# Patient Record
Sex: FEMALE | Race: BLACK OR AFRICAN AMERICAN | Employment: OTHER | URBAN - METROPOLITAN AREA
[De-identification: names, ages, dates, MRNs, and addresses within clinical notes are randomized per-mention and may not be internally consistent; named-entity substitution may affect disease eponyms.]

---

## 2017-01-18 ENCOUNTER — HOSPITAL ENCOUNTER (OUTPATIENT)
Dept: LAB | Age: 82
Discharge: HOME | End: 2017-01-18
Payer: COMMERCIAL

## 2017-01-18 ENCOUNTER — OFFICE VISIT (OUTPATIENT)
Dept: FAMILY MEDICINE CLINIC | Facility: CLINIC | Age: 82
End: 2017-01-18

## 2017-01-18 VITALS
RESPIRATION RATE: 16 BRPM | TEMPERATURE: 97.7 F | HEIGHT: 59 IN | BODY MASS INDEX: 28.22 KG/M2 | DIASTOLIC BLOOD PRESSURE: 68 MMHG | OXYGEN SATURATION: 98 % | HEART RATE: 60 BPM | SYSTOLIC BLOOD PRESSURE: 136 MMHG | WEIGHT: 140 LBS

## 2017-01-18 DIAGNOSIS — E78.00 PURE HYPERCHOLESTEROLEMIA: ICD-10-CM

## 2017-01-18 DIAGNOSIS — R80.9 MICROALBUMINURIA: ICD-10-CM

## 2017-01-18 DIAGNOSIS — F17.211 CIGARETTE NICOTINE DEPENDENCE IN REMISSION: ICD-10-CM

## 2017-01-18 DIAGNOSIS — E55.9 VITAMIN D DEFICIENCY: ICD-10-CM

## 2017-01-18 DIAGNOSIS — I10 ESSENTIAL HYPERTENSION: ICD-10-CM

## 2017-01-18 LAB
ALBUMIN SERPL BCP-MCNC: 3.8 G/DL (ref 3.4–5)
ALBUMIN/GLOB SERPL: 1.2 {RATIO} (ref 0.8–1.7)
ALP SERPL-CCNC: 74 U/L (ref 45–117)
ALT SERPL-CCNC: 24 U/L (ref 13–56)
ANION GAP BLD CALC-SCNC: 8 MMOL/L (ref 3–18)
AST SERPL W P-5'-P-CCNC: 16 U/L (ref 15–37)
BILIRUB SERPL-MCNC: 0.3 MG/DL (ref 0.2–1)
BUN SERPL-MCNC: 11 MG/DL (ref 7–18)
BUN/CREAT SERPL: 14 (ref 12–20)
CALCIUM SERPL-MCNC: 8.2 MG/DL (ref 8.5–10.1)
CHLORIDE SERPL-SCNC: 102 MMOL/L (ref 100–108)
CO2 SERPL-SCNC: 28 MMOL/L (ref 21–32)
CREAT SERPL-MCNC: 0.77 MG/DL (ref 0.6–1.3)
GLOBULIN SER CALC-MCNC: 3.3 G/DL (ref 2–4)
GLUCOSE POC: 166 MG/DL
GLUCOSE SERPL-MCNC: 177 MG/DL (ref 74–99)
HBA1C MFR BLD HPLC: 8.5 %
POTASSIUM SERPL-SCNC: 3.7 MMOL/L (ref 3.5–5.5)
PROT SERPL-MCNC: 7.1 G/DL (ref 6.4–8.2)
SODIUM SERPL-SCNC: 138 MMOL/L (ref 136–145)

## 2017-01-18 PROCEDURE — 80053 COMPREHEN METABOLIC PANEL: CPT | Performed by: INTERNAL MEDICINE

## 2017-01-18 PROCEDURE — 36415 COLL VENOUS BLD VENIPUNCTURE: CPT | Performed by: INTERNAL MEDICINE

## 2017-01-18 RX ORDER — METFORMIN HYDROCHLORIDE 500 MG/1
1000 TABLET, EXTENDED RELEASE ORAL DAILY
Qty: 180 TAB | Refills: 3 | Status: SHIPPED | OUTPATIENT
Start: 2017-01-18 | End: 2017-04-25 | Stop reason: ALTCHOICE

## 2017-01-18 RX ORDER — PRAVASTATIN SODIUM 40 MG/1
40 TABLET ORAL
Qty: 90 TAB | Refills: 3 | Status: SHIPPED | OUTPATIENT
Start: 2017-01-18 | End: 2017-07-03

## 2017-01-18 NOTE — PROGRESS NOTES
Internal Medicine Progress Note    Today's Date:  2017   Patient:  Jaya López  Patient :  1932    Subjective:     Chief Complaint   Patient presents with    Diabetes    Hypertension      Hypertension   This is a chronic problem. BP is not at goal in the office. Pt states that bp are at goal at home. Pt takes diovan, atenolol and nifedipine. Pt reports compliance with these medications.      Osteopenia  This is a chronic problem. This is not at goal. Pt takes fosamax. Pt has been on this for two years. Last dexa scan was in .      Diabetes mellitus  This is a chronic problem. This is not controlled. Pt takes metformin. Pt is on aspirin and statin. Pt states that her BS go low at home. Nicotine dependence in remission  This is a chronic problem. This is at goal. Pt used to smoke 1/4 ppd. Pt has smoked for 2 yrs. Pt quit in .      Past Medical History   Diagnosis Date    Advance directive discussed with patient 10/12/2016    Cigarette nicotine dependence in remission 10/12/2016    Diabetes ([facility]     Essential hypertension 10/3/2016    Hypertension     Microalbuminuria 2017    Osteopenia 10/3/2016    Osteoporosis     Pure hypercholesterolemia 2017    Type II diabetes mellitus ([address] 10/3/2016    Vitamin D deficiency 2017     History reviewed. No pertinent past surgical history. reports that she quit smoking about 55 years ago. She has a 0.50 pack-year smoking history. She does not have any smokeless tobacco history on file. She reports that she does not drink alcohol or use illicit drugs.   Family History   Problem Relation Age of Onset    Hypertension Mother     Cancer Mother      pancreas    Cancer Father      lung     No Known Allergies  Review of Systems   Positives in bold  CV:      chest pain, palpitations  PULM: SOB, wheezing, cough, sputum production    Current Outpatient Meds and Allergies     Current Outpatient Prescriptions on File Prior to Visit   Medication Sig Dispense Refill    atenolol (TENORMIN) 50 mg tablet Take 1 Tab by mouth daily. 90 Tab 0    NIFEdipine ER (ADALAT CC) 90 mg ER tablet Take 1 Tab by mouth daily. 90 Tab 3    ergocalciferol (ERGOCALCIFEROL) 50,000 unit capsule Take 1 Cap by mouth every seven (7) days. 12 Cap 3    aspirin 81 mg chewable tablet 81 mg.      valsartan (DIOVAN) 320 mg tablet       alendronate (FOSAMAX) 70 mg tablet Take  by mouth. No current facility-administered medications on file prior to visit. No Known Allergies  Objective:     VS:    Visit Vitals    /68  Comment: right arm manual    Pulse 60    Temp 97.7 °F (36.5 °C) (Oral)    Resp 16    Ht 4' 11\" (1.499 m)    Wt 140 lb (63.5 kg)    SpO2 98%    BMI 28.28 kg/m2     General:   Well-nourished, well-groomed, pleasant, alert, in no acute distress  Head:  Normocephalic, atraumatic  Ears:  External ears WNL  Nose:  External nares WNL  Psych:  No pressured speech, no abnormal thought content    Office Visit on 01/18/2017   Component Date Value Ref Range Status    Glucose POC 01/18/2017 166  mg/dL Final    Hemoglobin A1c (POC) 01/18/2017 8.5  % Final     Assessment/Plan & Orders:         ICD-10-CM ICD-9-CM    1. Uncontrolled type 2 diabetes mellitus without complication, without long-term current use of insulin (HCC) E11.65 250.02 AMB POC GLUCOSE, QUANTITATIVE, BLOOD      AMB POC HEMOGLOBIN A1C      metFORMIN ER (GLUCOPHAGE XR) 500 mg tablet   2. Essential hypertension I10 401.9    3. Pure hypercholesterolemia E78.00 272.0 LIPID PANEL      pravastatin (PRAVACHOL) 40 mg tablet      METABOLIC PANEL, COMPREHENSIVE      LIPID PANEL      METABOLIC PANEL, COMPREHENSIVE   4. Cigarette nicotine dependence in remission F17.211 V15.82    5. Microalbuminuria R80.9 791.0    6.  Vitamin D deficiency E55.9 268.9 VITAMIN D, 25 HYDROXY      VITAMIN D, 25 HYDROXY     Healthy lifestyle has been encouraged including avoidance of tobacco, limiting or avoiding alcohol intake, heart healthy diet which is low in cholesterol and saturated fat and contains fresh fruits, vegetables and whole grains and fiber, regular exercise with goals of 20-30 minutes 3-5 days weekly and maintaining an optimal BMI. Check BS at home BID  Bring BS log and glucometer to the next visit    Follow-up Disposition:  Return in about 1 week (around 1/25/2017) for Follow up hypertension, Follow up hyperlipidemia, Follow up diabetes mellitus. *Patient verbalized understanding and agreement with the plan. Patient was given an after-visit summary. Yunier Null.  Allie Diallo MD - Internal Medicine  1/18/2017, 11:20 AM  [facility]  [address] [place], [address]  Phone [phone]  Fax [phone]

## 2017-01-18 NOTE — MR AVS SNAPSHOT
Visit Information Date & Time Provider Department Dept. Phone Encounter #  
 1/18/2017  8:00 AM Julee Hurt MD [facility] [phone] [id number] Upcoming Health Maintenance Date Due  
 EYE EXAM RETINAL OR DILATED Q1 12/14/1942 ZOSTER VACCINE AGE 60> 12/14/1992 GLAUCOMA SCREENING Q2Y 12/14/1997 Pneumococcal 65+ Low/Medium Risk (2 of 2 - PPSV23) 11/2/2014 HEMOGLOBIN A1C Q6M 4/3/2017 LIPID PANEL Q1 10/3/2017 FOOT EXAM Q1 10/12/2017 MICROALBUMIN Q1 10/12/2017 MEDICARE YEARLY EXAM 10/13/2017 DTaP/Tdap/Td series (2 - Td) 12/10/2020 Allergies as of 1/18/2017  Review Complete On: 1/18/2017 By: Julee Hurt MD  
 No Known Allergies Current Immunizations  Never Reviewed Name Date Influenza Vaccine 10/15/2015, 12/31/2013, 1/2/2013, 11/2/2009 Influenza Vaccine (Quad) PF 10/3/2016 Pneumococcal Vaccine (Unspecified Type) 11/2/2009 Tdap 12/10/2010 Not reviewed this visit You Were Diagnosed With   
  
 Codes Comments Uncontrolled type 2 diabetes mellitus without complication, without long-term current use of insulin ([facility] [address]    -  Primary ICD-10-CM: E11.65 ICD-9-CM: 250.02 Essential hypertension     ICD-10-CM: I10 
ICD-9-CM: 401.9 Pure hypercholesterolemia     ICD-10-CM: E78.00 ICD-9-CM: 272.0 Cigarette nicotine dependence in remission     ICD-10-CM: F17.211 ICD-9-CM: V15.82 Microalbuminuria     ICD-10-CM: R80.9 ICD-9-CM: 791.0 Vitamin D deficiency     ICD-10-CM: E55.9 ICD-9-CM: 268.9 Vitals BP Pulse Temp Resp Height(growth percentile) Weight(growth percentile) 136/68 60 97.7 °F (36.5 °C) (Oral) 16 4' 11\" (1.499 m) 140 lb (63.5 kg) SpO2 BMI OB Status Smoking Status 98% 28.28 kg/m2 Menopause Former Smoker Vitals History BMI and BSA Data Body Mass Index Body Surface Area  
 28.28 kg/m 2 1.63 m 2 Preferred Pharmacy Pharmacy Name Phone [id number] - [address] [phone] Your Updated Medication List  
  
   
This list is accurate as of: 1/18/17  8:32 AM.  Always use your most recent med list.  
  
  
  
  
 alendronate 70 mg tablet Commonly known as:  FOSAMAX Take  by mouth. aspirin 81 mg chewable tablet 81 mg.  
  
 atenolol 50 mg tablet Commonly known as:  TENORMIN Take 1 Tab by mouth daily. ergocalciferol 50,000 unit capsule Commonly known as:  ERGOCALCIFEROL Take 1 Cap by mouth every seven (7) days. metFORMIN  mg tablet Commonly known as:  GLUCOPHAGE XR Take 2 Tabs by mouth daily. NIFEdipine ER 90 mg ER tablet Commonly known as:  ADALAT CC Take 1 Tab by mouth daily. pravastatin 40 mg tablet Commonly known as:  PRAVACHOL Take 1 Tab by mouth nightly. valsartan 320 mg tablet Commonly known as:  DIOVAN Prescriptions Sent to Pharmacy Refills  
 metFORMIN ER (GLUCOPHAGE XR) 500 mg tablet 3 Sig: Take 2 Tabs by mouth daily. Class: Normal  
 Pharmacy: [id number] - [address] Ph #: [phone] Route: Oral  
 pravastatin (PRAVACHOL) 40 mg tablet 3 Sig: Take 1 Tab by mouth nightly. Class: Normal  
 Pharmacy: [id number] - [address] Ph #: [phone] Route: Oral  
  
We Performed the Following AMB POC GLUCOSE, QUANTITATIVE, BLOOD [86888 CPT(R)] AMB POC HEMOGLOBIN A1C [48690 CPT(R)] To-Do List   
 01/18/2017 Lab:  METABOLIC PANEL, COMPREHENSIVE   
  
 04/18/2017 Lab:  VITAMIN D, 25 HYDROXY   
  
 04/21/2017 Lab:  LIPID PANEL Patient Instructions Stop fosamax Please provide this summary of care documentation to your next provider. Your primary care clinician is listed as Brayton Prader. If you have any questions after today's visit, please call [phone].

## 2017-01-18 NOTE — PROGRESS NOTES
Jazmin Maldonado is a 80 y.o. female presents today for follow up on her diabetes and hypertension. Patient reports no current issues. Patient is fasting. Pt is in Room # 2      1. Have you been to the ER, urgent care clinic since your last visit? Hospitalized since your last visit? No    2. Have you seen or consulted any other health care providers outside of the [address] [place] [address] since your last visit? Include any pap smears or colon screening. No       HM reviewed: patient reports that she completed her eye exam and glaucoma screening in 7/2016 with [facility]. Patient completed form to send for exam notes.      VORB:amb poc BS and A1C/Maribel Clay MD/Mikaela Rios LPN

## 2017-03-01 ENCOUNTER — OFFICE VISIT (OUTPATIENT)
Dept: FAMILY MEDICINE CLINIC | Facility: CLINIC | Age: 82
End: 2017-03-01

## 2017-03-01 ENCOUNTER — HOSPITAL ENCOUNTER (OUTPATIENT)
Dept: LAB | Age: 82
Discharge: HOME | End: 2017-03-01
Payer: COMMERCIAL

## 2017-03-01 VITALS
HEART RATE: 61 BPM | SYSTOLIC BLOOD PRESSURE: 136 MMHG | TEMPERATURE: 97.7 F | DIASTOLIC BLOOD PRESSURE: 62 MMHG | RESPIRATION RATE: 18 BRPM | WEIGHT: 138.8 LBS | HEIGHT: 59 IN | BODY MASS INDEX: 27.98 KG/M2 | OXYGEN SATURATION: 96 %

## 2017-03-01 DIAGNOSIS — E78.00 PURE HYPERCHOLESTEROLEMIA: ICD-10-CM

## 2017-03-01 DIAGNOSIS — E55.9 VITAMIN D DEFICIENCY: ICD-10-CM

## 2017-03-01 DIAGNOSIS — I10 ESSENTIAL HYPERTENSION: ICD-10-CM

## 2017-03-01 DIAGNOSIS — H66.001 ACUTE SUPPURATIVE OTITIS MEDIA OF RIGHT EAR WITHOUT SPONTANEOUS RUPTURE OF TYMPANIC MEMBRANE, RECURRENCE NOT SPECIFIED: Primary | ICD-10-CM

## 2017-03-01 LAB
25(OH)D3 SERPL-MCNC: 29.5 NG/ML (ref 30–100)
ALBUMIN SERPL BCP-MCNC: 3.7 G/DL (ref 3.4–5)
ALBUMIN/GLOB SERPL: 1 {RATIO} (ref 0.8–1.7)
ALP SERPL-CCNC: 73 U/L (ref 45–117)
ALT SERPL-CCNC: 22 U/L (ref 13–56)
ANION GAP BLD CALC-SCNC: 11 MMOL/L (ref 3–18)
AST SERPL W P-5'-P-CCNC: 13 U/L (ref 15–37)
BILIRUB SERPL-MCNC: 0.3 MG/DL (ref 0.2–1)
BUN SERPL-MCNC: 14 MG/DL (ref 7–18)
BUN/CREAT SERPL: 16 (ref 12–20)
CALCIUM SERPL-MCNC: 8.4 MG/DL (ref 8.5–10.1)
CHLORIDE SERPL-SCNC: 102 MMOL/L (ref 100–108)
CHOLEST SERPL-MCNC: 249 MG/DL
CO2 SERPL-SCNC: 24 MMOL/L (ref 21–32)
CREAT SERPL-MCNC: 0.87 MG/DL (ref 0.6–1.3)
EST. AVERAGE GLUCOSE BLD GHB EST-MCNC: 200 MG/DL
GLOBULIN SER CALC-MCNC: 3.7 G/DL (ref 2–4)
GLUCOSE SERPL-MCNC: 215 MG/DL (ref 74–99)
HBA1C MFR BLD: 8.6 % (ref 4.2–5.6)
HDLC SERPL-MCNC: 58 MG/DL (ref 40–60)
HDLC SERPL: 4.3 {RATIO} (ref 0–5)
LDLC SERPL CALC-MCNC: 169.2 MG/DL (ref 0–100)
LIPID PROFILE,FLP: ABNORMAL
POTASSIUM SERPL-SCNC: 4 MMOL/L (ref 3.5–5.5)
PROT SERPL-MCNC: 7.4 G/DL (ref 6.4–8.2)
SODIUM SERPL-SCNC: 137 MMOL/L (ref 136–145)
TRIGL SERPL-MCNC: 109 MG/DL (ref ?–150)
VLDLC SERPL CALC-MCNC: 21.8 MG/DL

## 2017-03-01 PROCEDURE — 80053 COMPREHEN METABOLIC PANEL: CPT | Performed by: INTERNAL MEDICINE

## 2017-03-01 PROCEDURE — 82306 VITAMIN D 25 HYDROXY: CPT | Performed by: INTERNAL MEDICINE

## 2017-03-01 PROCEDURE — 80061 LIPID PANEL: CPT | Performed by: INTERNAL MEDICINE

## 2017-03-01 PROCEDURE — 36415 COLL VENOUS BLD VENIPUNCTURE: CPT | Performed by: INTERNAL MEDICINE

## 2017-03-01 PROCEDURE — 83036 HEMOGLOBIN GLYCOSYLATED A1C: CPT | Performed by: INTERNAL MEDICINE

## 2017-03-01 RX ORDER — AMOXICILLIN 500 MG/1
500 CAPSULE ORAL 2 TIMES DAILY
Qty: 20 CAP | Refills: 0 | Status: SHIPPED | OUTPATIENT
Start: 2017-03-01 | End: 2017-03-11

## 2017-03-01 NOTE — PATIENT INSTRUCTIONS
Ear Infection (Otitis Media): Care Instructions  Your Care Instructions    An ear infection may start with a cold and affect the middle ear (otitis media). It can hurt a lot. Most ear infections clear up on their own in a couple of days. Most often you will not need antibiotics. This is because many ear infections are caused by a virus. Antibiotics don't work against a virus. Regular doses of pain medicines are the best way to reduce your fever and help you feel better. Follow-up care is a key part of your treatment and safety. Be sure to make and go to all appointments, and call your doctor if you are having problems. It's also a good idea to know your test results and keep a list of the medicines you take. How can you care for yourself at home? · Take pain medicines exactly as directed. ¨ If the doctor gave you a prescription medicine for pain, take it as prescribed. ¨ If you are not taking a prescription pain medicine, take an over-the-counter medicine, such as acetaminophen (Tylenol), ibuprofen (Advil, Motrin), or naproxen (Aleve). Read and follow all instructions on the label. ¨ Do not take two or more pain medicines at the same time unless the doctor told you to. Many pain medicines have acetaminophen, which is Tylenol. Too much acetaminophen (Tylenol) can be harmful. · Plan to take a full dose of pain reliever before bedtime. Getting enough sleep will help you get better. · Try a warm, moist washcloth on the ear. It may help relieve pain. · If your doctor prescribed antibiotics, take them as directed. Do not stop taking them just because you feel better. You need to take the full course of antibiotics. When should you call for help? Call your doctor now or seek immediate medical care if:  · You have new or increasing ear pain. · You have new or increasing pus or blood draining from your ear. · You have a fever with a stiff neck or a severe headache.   Watch closely for changes in your health, and be sure to contact your doctor if:  · You have new or worse symptoms. · You are not getting better after taking an antibiotic for 2 days. Where can you learn more? Go to http://shashank-louie.info/. Enter A030 in the search box to learn more about \"Ear Infection (Otitis Media): Care Instructions. \"  Current as of: July 29, 2016  Content Version: 11.1  © 1753-7734 [employer]. Care instructions adapted under license by [employer] (which disclaims liability or warranty for this information). If you have questions about a medical condition or this instruction, always ask your healthcare professional. [address] any warranty or liability for your use of this information.

## 2017-03-01 NOTE — PROGRESS NOTES
Internal Medicine Progress Note    Today's Date:  3/1/2017   Patient:  Laron Manrique  Patient :  1932    Subjective:     Chief Complaint   Patient presents with    Ear Pain     right    Dizziness    Headache    Diabetes      Right ear pain  This is an acute problem. This is not at goal. Symptoms started four days ago. +dizziness and headache    Hypertension   This is a chronic problem. BP is at goal.  Pt takes diovan, atenolol and nifedipine. Pt reports compliance with these medications.      Osteopenia  This is a chronic problem. This is not at goal. Pt is off fosamax. Pt was on fosamax for two years. Last dexa scan was in . Pt is on ergocalciferol for vitamin D deficiency.       Diabetes mellitus  This is a chronic problem. This is not controlled. Pt takes metformin. Pt is on aspirin and statin.  +microalbuminuria    Nicotine dependence in remission  This is a chronic problem. This is at goal. Pt used to smoke 1/4 ppd. Pt has smoked for 2 yrs. Pt quit in .      Past Medical History:   Diagnosis Date    Advance directive discussed with patient 10/12/2016    Cigarette nicotine dependence in remission 10/12/2016    Diabetes ([facility] [address]     Essential hypertension 10/3/2016    Hypertension     Microalbuminuria 2017    Osteopenia 10/3/2016    Osteoporosis     Pure hypercholesterolemia 2017    Type II diabetes mellitus ([address] 10/3/2016    Vitamin D deficiency 2017     History reviewed. No pertinent surgical history. reports that she quit smoking about 55 years ago. She has a 0.50 pack-year smoking history. She does not have any smokeless tobacco history on file. She reports that she does not drink alcohol or use illicit drugs.   Family History   Problem Relation Age of Onset    Hypertension Mother     Cancer Mother      pancreas    Cancer Father      lung     No Known Allergies  Review of Systems   Positives in bold  CV:      chest pain, palpitations  PULM:  SOB, wheezing, cough, sputum production    Current Outpatient Meds and Allergies     Current Outpatient Prescriptions on File Prior to Visit   Medication Sig Dispense Refill    metFORMIN ER (GLUCOPHAGE XR) 500 mg tablet Take 2 Tabs by mouth daily. (Patient taking differently: Take 500 mg by mouth daily. ) 180 Tab 3    pravastatin (PRAVACHOL) 40 mg tablet Take 1 Tab by mouth nightly. 90 Tab 3    atenolol (TENORMIN) 50 mg tablet Take 1 Tab by mouth daily. 90 Tab 0    NIFEdipine ER (ADALAT CC) 90 mg ER tablet Take 1 Tab by mouth daily. 90 Tab 3    ergocalciferol (ERGOCALCIFEROL) 50,000 unit capsule Take 1 Cap by mouth every seven (7) days. 12 Cap 3    aspirin 81 mg chewable tablet 81 mg.      valsartan (DIOVAN) 320 mg tablet        No current facility-administered medications on file prior to visit.       No Known Allergies  Objective:     VS:    Visit Vitals    /62 (BP 1 Location: Left arm, BP Patient Position: Sitting)  Comment: manual    Pulse 61    Temp 97.7 °F (36.5 °C) (Oral)    Resp 18    Ht 4' 11\" (1.499 m)    Wt 138 lb 12.8 oz (63 kg)    SpO2 96%    BMI 28.03 kg/m2     General:   Well-nourished, well-groomed, pleasant, alert, in no acute distress  Head:  Normocephalic, atraumatic, MMM, good dentition, oropharynx WNL without membranes, exudates, petechiae or ulcers  Ears:  External ears WNL, L TM WNL, R TM could not be visualized due to yellow wax/pus in the ear canal  Eyes:  EOMI, PERRL  Nose:  External nares WNL  Psych:  No pressured speech, no abnormal thought content    Hospital Outpatient Visit on 01/18/2017   Component Date Value Ref Range Status    Sodium 01/18/2017 138  136 - 145 mmol/L Final    Potassium 01/18/2017 3.7  3.5 - 5.5 mmol/L Final    Chloride 01/18/2017 102  100 - 108 mmol/L Final    CO2 01/18/2017 28  21 - 32 mmol/L Final    Anion gap 01/18/2017 8  3.0 - 18 mmol/L Final    Glucose 01/18/2017 177* 74 - 99 mg/dL Final    BUN 01/18/2017 11  7.0 - 18 MG/DL Final    Creatinine 01/18/2017 0.77  0.6 - 1.3 MG/DL Final    BUN/Creatinine ratio 01/18/2017 14  12 - 20   Final    GFR est AA 01/18/2017 >60  >60 ml/min/1.73m2 Final    GFR est non-AA 01/18/2017 >60  >60 ml/min/1.73m2 Final    Comment: (NOTE)  Estimated GFR is calculated using the Modification of Diet in Renal   Disease (MDRD) Study equation, reported for both  Americans   (GFRAA) and non- Americans (GFRNA), and normalized to 1.73m2   body surface area. The physician must decide which value applies to   the patient. The MDRD study equation should only be used in   individuals age 25 or older. It has not been validated for the   following: pregnant women, patients with serious comorbid conditions,   or on certain medications, or persons with extremes of body size,   muscle mass, or nutritional status.  Calcium 01/18/2017 8.2* 8.5 - 10.1 MG/DL Final    Bilirubin, total 01/18/2017 0.3  0.2 - 1.0 MG/DL Final    ALT (SGPT) 01/18/2017 24  13 - 56 U/L Final    AST (SGOT) 01/18/2017 16  15 - 37 U/L Final    Alk. phosphatase 01/18/2017 74  45 - 117 U/L Final    Protein, total 01/18/2017 7.1  6.4 - 8.2 g/dL Final    Albumin 01/18/2017 3.8  3.4 - 5.0 g/dL Final    Globulin 01/18/2017 3.3  2.0 - 4.0 g/dL Final    A-G Ratio 01/18/2017 1.2  0.8 - 1.7   Final   Office Visit on 01/18/2017   Component Date Value Ref Range Status    Glucose POC 01/18/2017 166  mg/dL Final    Hemoglobin A1c (POC) 01/18/2017 8.5  % Final     Assessment/Plan & Orders:         ICD-10-CM ICD-9-CM    1. Acute suppurative otitis media of right ear without spontaneous rupture of tympanic membrane, recurrence not specified H66.001 382.00 amoxicillin (AMOXIL) 500 mg capsule   2. Uncontrolled type 2 diabetes mellitus without complication, without long-term current use of insulin (HCC) E11.65 250.02 HEMOGLOBIN A1C WITH EAG   3. Essential hypertension I10 401.9    4.  Pure hypercholesterolemia C15.73 849.2 METABOLIC PANEL, COMPREHENSIVE      LIPID PANEL   5. Vitamin D deficiency E55.9 268.9 VITAMIN D, 25 HYDROXY   6. Normal body mass index (BMI) Z78.9 V49.89      Healthy lifestyle has been encouraged including avoidance of tobacco, limiting or avoiding alcohol intake, heart healthy diet which is low in cholesterol and saturated fat and contains fresh fruits, vegetables and whole grains and fiber, regular exercise with goals of 20-30 minutes 3-5 days weekly and maintaining an optimal BMI. Pt to get eye exam. Form given  Pt declines immunization prescriptions at this time until she knows where her next pharmacy is going to be    Follow-up Disposition:  Return if symptoms worsen or fail to improve. *Patient verbalized understanding and agreement with the plan. Patient was given an after-visit summary. Faisal Charles.  Leo Kwok MD - Internal Medicine  3/1/2017, 11:20 AM  [facility]  [address] [place], [address]  Phone [phone]  Fax [phone]

## 2017-03-01 NOTE — MR AVS SNAPSHOT
Visit Information Date & Time Provider Department Dept. Phone Encounter #  
 3/1/2017  8:45 AM Adelina Caruso MD [facility] [phone] [id number] Follow-up Instructions Return if symptoms worsen or fail to improve. Your Appointments 4/19/2017 10:30 AM  
Follow Up with Adelina Caruso MD  
[facility]) Appt Note: 3 month follow up [address] [facility] [address] [id number]  
[address] [place] [address] Upcoming Health Maintenance Date Due  
 EYE EXAM RETINAL OR DILATED Q1 12/14/1942 ZOSTER VACCINE AGE 60> 12/14/1992 GLAUCOMA SCREENING Q2Y 12/14/1997 Pneumococcal 65+ Low/Medium Risk (2 of 2 - PPSV23) 11/2/2014 HEMOGLOBIN A1C Q6M 7/18/2017 LIPID PANEL Q1 10/3/2017 FOOT EXAM Q1 10/12/2017 MICROALBUMIN Q1 10/12/2017 MEDICARE YEARLY EXAM 10/13/2017 DTaP/Tdap/Td series (2 - Td) 12/10/2020 Allergies as of 3/1/2017  Review Complete On: 3/1/2017 By: Adelina Caruso MD  
 No Known Allergies Current Immunizations  Never Reviewed Name Date Influenza Vaccine 10/15/2015, 12/31/2013, 1/2/2013, 11/2/2009 Influenza Vaccine (Quad) PF 10/3/2016 Pneumococcal Vaccine (Unspecified Type) 11/2/2009 Tdap 12/10/2010 Not reviewed this visit You Were Diagnosed With   
  
 Codes Comments Acute suppurative otitis media of right ear without spontaneous rupture of tympanic membrane, recurrence not specified    -  Primary ICD-10-CM: H66.001 ICD-9-CM: 382.00 Uncontrolled type 2 diabetes mellitus without complication, without long-term current use of insulin ([facility] [address]     ICD-10-CM: E11.65 ICD-9-CM: 250.02 Essential hypertension     ICD-10-CM: I10 
ICD-9-CM: 401.9 Pure hypercholesterolemia     ICD-10-CM: E78.00 ICD-9-CM: 272.0 Vitamin D deficiency     ICD-10-CM: E55.9 ICD-9-CM: 268.9 Normal body mass index (BMI)     ICD-10-CM: Z78.9 ICD-9-CM: V49.89 Vitals BP  
  
  
  
  
  
 136/62 (BP 1 Location: Left arm, BP Patient Position: Sitting) Vitals History BMI and BSA Data Body Mass Index Body Surface Area 28.03 kg/m 2 1.62 m 2 Your Updated Medication List  
  
   
This list is accurate as of: 3/1/17  9:25 AM.  Always use your most recent med list.  
  
  
  
  
 amoxicillin 500 mg capsule Commonly known as:  AMOXIL Take 1 Cap by mouth two (2) times a day for 10 days. aspirin 81 mg chewable tablet 81 mg.  
  
 atenolol 50 mg tablet Commonly known as:  TENORMIN Take 1 Tab by mouth daily. ergocalciferol 50,000 unit capsule Commonly known as:  ERGOCALCIFEROL Take 1 Cap by mouth every seven (7) days. metFORMIN  mg tablet Commonly known as:  GLUCOPHAGE XR Take 2 Tabs by mouth daily. NIFEdipine ER 90 mg ER tablet Commonly known as:  ADALAT CC Take 1 Tab by mouth daily. pravastatin 40 mg tablet Commonly known as:  PRAVACHOL Take 1 Tab by mouth nightly. valsartan 320 mg tablet Commonly known as:  DIOVAN Prescriptions Printed Refills  
 amoxicillin (AMOXIL) 500 mg capsule 0 Sig: Take 1 Cap by mouth two (2) times a day for 10 days. Class: Print Route: Oral  
  
Follow-up Instructions Return if symptoms worsen or fail to improve. To-Do List   
 03/01/2017 Lab:  HEMOGLOBIN A1C WITH EAG   
  
 03/01/2017 Lab:  METABOLIC PANEL, COMPREHENSIVE   
  
 03/01/2017 Lab:  VITAMIN D, 25 HYDROXY   
  
 03/04/2017 Lab:  LIPID PANEL Patient Instructions Ear Infection (Otitis Media): Care Instructions Your Care Instructions An ear infection may start with a cold and affect the middle ear (otitis media). It can hurt a lot. Most ear infections clear up on their own in a couple of days. Most often you will not need antibiotics.  This is because many ear infections are caused by a virus. Antibiotics don't work against a virus. Regular doses of pain medicines are the best way to reduce your fever and help you feel better. Follow-up care is a key part of your treatment and safety. Be sure to make and go to all appointments, and call your doctor if you are having problems. It's also a good idea to know your test results and keep a list of the medicines you take. How can you care for yourself at home? · Take pain medicines exactly as directed. ¨ If the doctor gave you a prescription medicine for pain, take it as prescribed. ¨ If you are not taking a prescription pain medicine, take an over-the-counter medicine, such as acetaminophen (Tylenol), ibuprofen (Advil, Motrin), or naproxen (Aleve). Read and follow all instructions on the label. ¨ Do not take two or more pain medicines at the same time unless the doctor told you to. Many pain medicines have acetaminophen, which is Tylenol. Too much acetaminophen (Tylenol) can be harmful. · Plan to take a full dose of pain reliever before bedtime. Getting enough sleep will help you get better. · Try a warm, moist washcloth on the ear. It may help relieve pain. · If your doctor prescribed antibiotics, take them as directed. Do not stop taking them just because you feel better. You need to take the full course of antibiotics. When should you call for help? Call your doctor now or seek immediate medical care if: 
· You have new or increasing ear pain. · You have new or increasing pus or blood draining from your ear. · You have a fever with a stiff neck or a severe headache. Watch closely for changes in your health, and be sure to contact your doctor if: 
· You have new or worse symptoms. · You are not getting better after taking an antibiotic for 2 days. Where can you learn more? Go to http://shashank-louie.info/.  
Enter C237 in the search box to learn more about \"Ear Infection (Otitis Media): Care Instructions. \" Current as of: July 29, 2016 Content Version: 11.1 © 6832-9657 [employer]. Care instructions adapted under license by [employer] (which disclaims liability or warranty for this information). If you have questions about a medical condition or this instruction, always ask your healthcare professional. [address] any warranty or liability for your use of this information. Please provide this summary of care documentation to your next provider. Your primary care clinician is listed as Stefan Zaman. If you have any questions after today's visit, please call [phone].

## 2017-03-01 NOTE — PROGRESS NOTES
Mukesh Matt is a 80 y.o. female presents today for right ear pain along with a headache for a  week. Patient is fasting. Patient reports that ear pain and headache is now making her dizzy. Pt is in Room # 2        1. Have you been to the ER, urgent care clinic since your last visit? Hospitalized since your last visit? No    2. Have you seen or consulted any other health care providers outside of the [address] [place] [address] since your last visit? Include any pap smears or colon screening.  No      reviewed:

## 2017-03-16 ENCOUNTER — OFFICE VISIT (OUTPATIENT)
Dept: FAMILY MEDICINE CLINIC | Facility: CLINIC | Age: 82
End: 2017-03-16

## 2017-03-16 VITALS
RESPIRATION RATE: 16 BRPM | HEIGHT: 59 IN | TEMPERATURE: 97.2 F | BODY MASS INDEX: 27.17 KG/M2 | WEIGHT: 134.8 LBS | SYSTOLIC BLOOD PRESSURE: 138 MMHG | HEART RATE: 67 BPM | OXYGEN SATURATION: 98 % | DIASTOLIC BLOOD PRESSURE: 68 MMHG

## 2017-03-16 DIAGNOSIS — R42 DIZZINESS: ICD-10-CM

## 2017-03-16 DIAGNOSIS — I10 ESSENTIAL HYPERTENSION: ICD-10-CM

## 2017-03-16 LAB — GLUCOSE POC: 123 MG/DL

## 2017-03-16 RX ORDER — HYDROCODONE BITARTRATE AND ACETAMINOPHEN 5; 325 MG/1; MG/1
TABLET ORAL
COMMUNITY
Start: 2017-03-03 | End: 2017-03-22

## 2017-03-16 RX ORDER — PREDNISONE 10 MG/1
TABLET ORAL
Refills: 0 | COMMUNITY
Start: 2017-03-03 | End: 2017-03-16 | Stop reason: ALTCHOICE

## 2017-03-16 RX ORDER — VALACYCLOVIR HYDROCHLORIDE 1 G/1
TABLET, FILM COATED ORAL
Refills: 0 | COMMUNITY
Start: 2017-03-03 | End: 2017-03-16 | Stop reason: ALTCHOICE

## 2017-03-16 RX ORDER — MECLIZINE HCL 12.5 MG 12.5 MG/1
12.5 TABLET ORAL
Qty: 30 TAB | Refills: 3 | Status: SHIPPED | OUTPATIENT
Start: 2017-03-16 | End: 2017-03-22

## 2017-03-16 RX ORDER — ASPIRIN 81 MG/1
TABLET ORAL DAILY
COMMUNITY
End: 2017-11-15

## 2017-03-16 NOTE — PROGRESS NOTES
Juancarlos Rosenthal is a 80 y.o. female presents today for ED follow up. Patient reports that she was seen in [facility] ED 3 weeks ago for bells palsy followed by shingles outbreak. Patient reports of facial droop, right ear infection. Patient reports that she is nausea and has lost of appetite. Pt is in Room # 2        1. Have you been to the ER, urgent care clinic since your last visit? Hospitalized since your last visit? Yes When: 2 weeks ago Where: [facility] ED Reason for visit: right ear infection, bells palsy    2. Have you seen or consulted any other health care providers outside of the [address] [place] [address] since your last visit? Include any pap smears or colon screening. No      reviewed: patient unable to complete due to recent illness.

## 2017-03-16 NOTE — PATIENT INSTRUCTIONS
Epley Maneuver for Vertigo: Exercises  Your Care Instructions  The Epley Maneuver is a series of movements your doctor may use to treat your vertigo. Here are the steps for the exercises. Your doctor or physical therapist will guide you through the movements. A single 10- to 15-minute session often is all that's needed. Crystal debris (canaliths) cause the vertigo. When your head is moved into different positions, the debris moves freely. This may cause your symptoms to stop. How to do the exercises  Step 1    · You will sit on the doctor's exam table. Your legs will be out in front of you. The doctor or physical therapist will turn your head so that it is correction between looking straight ahead and looking to the side that causes the worst vertigo. · Without changing your head position, he or she will guide you back quickly. Your shoulders will be on the table. Your head will hang over the edge of the table. At this point, the side of your head that is causing the worst vertigo will face the floor. You'll stay in this position for 30 seconds or until your symptoms stop. Step 2    · Then, the doctor or physical therapist will turn your head to the other side. You don't need to lift your head. The other side of your head will face the floor. You will stay in this position for 30 seconds or until your symptoms stop. Step 3    · The doctor or physical therapist will help you roll your body in the same direction that your head is facing. You will lie on your side. (For example, if you are looking to your right, you will roll onto your right side.) The side that causes the worst symptoms should be facing up. You'll stay in this position for another 30 seconds or until your symptoms stop. Step 4    · The doctor or physical therapist will then help you to sit back up. Your legs will hang off the table on the same side that you were facing. Follow-up care is a key part of your treatment and safety.  Be sure to make and go to all appointments, and call your doctor if you are having problems. It's also a good idea to know your test results and keep a list of the medicines you take. Where can you learn more? Go to http://shashank-louie.info/. Enter H169 in the search box to learn more about \"Epley Maneuver for Vertigo: Exercises. \"  Current as of: July 29, 2016  Content Version: 11.1  © [id number] [employer]. Care instructions adapted under license by [employer] (which disclaims liability or warranty for this information). If you have questions about a medical condition or this instruction, always ask your healthcare professional. [address] any warranty or liability for your use of this information.

## 2017-03-16 NOTE — PROGRESS NOTES
Internal Medicine Progress Note    Today's Date:  3/19/2017   Patient:  Suzy Salvador  Patient :  [id number]    Subjective:     Chief Complaint   Patient presents with    Facial Droop    Shingles    ED Follow-up    Dizziness      Dizziness/Bell's palsy  This is an acute problem. This is not at goal. Symptoms started two weeks ago. Pt was seen in the ED and treated with an antiviral and prednisone. Hypertension   This is a chronic problem. BP is at goal.  Pt takes diovan, atenolol and nifedipine. Pt reports compliance with these medications.      Osteopenia  This is a chronic problem. This is not at goal. Pt is off fosamax. Pt was on fosamax for two years. Last dexa scan was in . Pt is on ergocalciferol for vitamin D deficiency.       Diabetes mellitus  This is a chronic problem. This is not controlled. Pt takes metformin. Pt is on aspirin and statin.  +microalbuminuria    Nicotine dependence in remission  This is a chronic problem. This is at goal. Pt used to smoke 1/4 ppd. Pt has smoked for 2 yrs. Pt quit in .      Past Medical History:   Diagnosis Date    Advance directive discussed with patient 10/12/2016    Cigarette nicotine dependence in remission 10/12/2016    Diabetes ([address]     Dizziness 3/16/2017    Essential hypertension 10/3/2016    Hypertension     Microalbuminuria 2017    Osteopenia 10/3/2016    Osteoporosis     Pure hypercholesterolemia 2017    Type II diabetes mellitus ([address] 10/3/2016    Vitamin D deficiency 2017     History reviewed. No pertinent surgical history. reports that she quit smoking about 55 years ago. She has a 0.50 pack-year smoking history. She does not have any smokeless tobacco history on file. She reports that she does not drink alcohol or use illicit drugs.   Family History   Problem Relation Age of Onset    Hypertension Mother     Cancer Mother      pancreas    Cancer Father      lung     No Known Allergies  Review of Systems Positives in bold  CV:      chest pain, palpitations  PULM:  SOB, wheezing, cough, sputum production    Current Outpatient Meds and Allergies     Current Outpatient Prescriptions on File Prior to Visit   Medication Sig Dispense Refill    metFORMIN ER (GLUCOPHAGE XR) 500 mg tablet Take 2 Tabs by mouth daily. (Patient taking differently: Take 500 mg by mouth daily. ) 180 Tab 3    pravastatin (PRAVACHOL) 40 mg tablet Take 1 Tab by mouth nightly. 90 Tab 3    atenolol (TENORMIN) 50 mg tablet Take 1 Tab by mouth daily. 90 Tab 0    NIFEdipine ER (ADALAT CC) 90 mg ER tablet Take 1 Tab by mouth daily. 90 Tab 3    ergocalciferol (ERGOCALCIFEROL) 50,000 unit capsule Take 1 Cap by mouth every seven (7) days. 12 Cap 3    aspirin 81 mg chewable tablet 81 mg.      valsartan (DIOVAN) 320 mg tablet        No current facility-administered medications on file prior to visit.       No Known Allergies  Objective:     VS:    Visit Vitals    /68 (BP 1 Location: Left arm, BP Patient Position: Sitting)  Comment: manual    Pulse 67    Temp 97.2 °F (36.2 °C) (Oral)    Resp 16    Ht 4' 11\" (1.499 m)    Wt 134 lb 12.8 oz (61.1 kg)    SpO2 98%    BMI 27.23 kg/m2     General:   Well-nourished, well-groomed, pleasant, alert, in no acute distress  Head:  Normocephalic, atraumatic, MMM, good dentition, oropharynx WNL without membranes, exudates, petechiae or ulcers  Ears:  External ears WNL, TMs WNL, +healing vesicular rash on right ear  Eyes:  EOMI, PERRL  Nose:  External nares WNL  Neuro:   Alert, conversant, appropriate, following commands, +right facial droop, able to raise both eyebrows   Psych:  No pressured speech, no abnormal thought content  Reena anever: +dizziness with laying back on the right and left, no nystagmus    Office Visit on 03/16/2017   Component Date Value Ref Range Status    Glucose POC 03/16/2017 123  mg/dL Final   Hospital Outpatient Visit on 03/01/2017   Component Date Value Ref Range Status    LIPID PROFILE 03/01/2017        Final    Cholesterol, total 03/01/2017 249* <200 MG/DL Final    Triglyceride 03/01/2017 109  <150 MG/DL Final    Comment: The drugs N-acetylcysteine (NAC) and  Metamiszole have been found to cause falsely  low results in this chemical assay. Please  be sure to submit blood samples obtained  BEFORE administration of either of these  drugs to assure correct results.  HDL Cholesterol 03/01/2017 58  40 - 60 MG/DL Final    LDL, calculated 03/01/2017 169.2* 0 - 100 MG/DL Final    VLDL, calculated 03/01/2017 21.8  MG/DL Final    CHOL/HDL Ratio 03/01/2017 4.3  0 - 5.0   Final    Sodium 03/01/2017 137  136 - 145 mmol/L Final    Potassium 03/01/2017 4.0  3.5 - 5.5 mmol/L Final    Chloride 03/01/2017 102  100 - 108 mmol/L Final    CO2 03/01/2017 24  21 - 32 mmol/L Final    Anion gap 03/01/2017 11  3.0 - 18 mmol/L Final    Glucose 03/01/2017 215* 74 - 99 mg/dL Final    BUN 03/01/2017 14  7.0 - 18 MG/DL Final    Creatinine 03/01/2017 0.87  0.6 - 1.3 MG/DL Final    BUN/Creatinine ratio 03/01/2017 16  12 - 20   Final    GFR est AA 03/01/2017 >60  >60 ml/min/1.73m2 Final    GFR est non-AA 03/01/2017 >60  >60 ml/min/1.73m2 Final    Comment: (NOTE)  Estimated GFR is calculated using the Modification of Diet in Renal   Disease (MDRD) Study equation, reported for both  Americans   (GFRAA) and non- Americans (GFRNA), and normalized to 1.73m2   body surface area. The physician must decide which value applies to   the patient. The MDRD study equation should only be used in   individuals age 25 or older. It has not been validated for the   following: pregnant women, patients with serious comorbid conditions,   or on certain medications, or persons with extremes of body size,   muscle mass, or nutritional status.       Calcium 03/01/2017 8.4* 8.5 - 10.1 MG/DL Final    Bilirubin, total 03/01/2017 0.3  0.2 - 1.0 MG/DL Final    ALT (SGPT) 03/01/2017 22  13 - 56 U/L Final    AST (SGOT) 03/01/2017 13* 15 - 37 U/L Final    Alk. phosphatase 03/01/2017 73  45 - 117 U/L Final    Protein, total 03/01/2017 7.4  6.4 - 8.2 g/dL Final    Albumin 03/01/2017 3.7  3.4 - 5.0 g/dL Final    Globulin 03/01/2017 3.7  2.0 - 4.0 g/dL Final    A-G Ratio 03/01/2017 1.0  0.8 - 1.7   Final    Vitamin D 25-Hydroxy 03/01/2017 29.5* 30 - 100 ng/mL Final    Comment: (NOTE)  Deficiency               <20 ng/mL  Insufficiency          20-30 ng/mL  Sufficient             ng/mL  Possible toxicity       >100 ng/mL    The Method used is Siemens Advia Centaur currently standardized to a   Center of Disease Control and Prevention (CDC) certified reference   [address]. Samples containing fluorescein dye can produce falsely   elevated values when tested with the ADVIA Centaur Vitamin D Assay. It is recommended that results in the toxic range, >100 ng/mL, be   retested 72 hours post fluorescein exposure.  Hemoglobin A1c 03/01/2017 8.6* 4.2 - 5.6 % Final    Comment: (NOTE)  HbA1C Interpretive Ranges  <5.7              Normal  5.7 - 6.4         Consider Prediabetes  >6.5              Consider Diabetes      Est. average glucose 03/01/2017 200  mg/dL Final    Comment: (NOTE)  The eAG should be interpreted with patient characteristics in mind   since ethnicity, interindividual differences, red cell lifespan,   variation in rates of glycation, etc. may affect the validity of the   calculation.      Hospital Outpatient Visit on 01/18/2017   Component Date Value Ref Range Status    Sodium 01/18/2017 138  136 - 145 mmol/L Final    Potassium 01/18/2017 3.7  3.5 - 5.5 mmol/L Final    Chloride 01/18/2017 102  100 - 108 mmol/L Final    CO2 01/18/2017 28  21 - 32 mmol/L Final    Anion gap 01/18/2017 8  3.0 - 18 mmol/L Final    Glucose 01/18/2017 177* 74 - 99 mg/dL Final    BUN 01/18/2017 11  7.0 - 18 MG/DL Final    Creatinine 01/18/2017 0.77  0.6 - 1.3 MG/DL Final    BUN/Creatinine ratio 01/18/2017 14  12 - 20 Final    GFR est AA 01/18/2017 >60  >60 ml/min/1.73m2 Final    GFR est non-AA 01/18/2017 >60  >60 ml/min/1.73m2 Final    Comment: (NOTE)  Estimated GFR is calculated using the Modification of Diet in Renal   Disease (MDRD) Study equation, reported for both  Americans   (GFRAA) and non- Americans (GFRNA), and normalized to 1.73m2   body surface area. The physician must decide which value applies to   the patient. The MDRD study equation should only be used in   individuals age 25 or older. It has not been validated for the   following: pregnant women, patients with serious comorbid conditions,   or on certain medications, or persons with extremes of body size,   muscle mass, or nutritional status.  Calcium 01/18/2017 8.2* 8.5 - 10.1 MG/DL Final    Bilirubin, total 01/18/2017 0.3  0.2 - 1.0 MG/DL Final    ALT (SGPT) 01/18/2017 24  13 - 56 U/L Final    AST (SGOT) 01/18/2017 16  15 - 37 U/L Final    Alk. phosphatase 01/18/2017 74  45 - 117 U/L Final    Protein, total 01/18/2017 7.1  6.4 - 8.2 g/dL Final    Albumin 01/18/2017 3.8  3.4 - 5.0 g/dL Final    Globulin 01/18/2017 3.3  2.0 - 4.0 g/dL Final    A-G Ratio 01/18/2017 1.2  0.8 - 1.7   Final   Office Visit on 01/18/2017   Component Date Value Ref Range Status    Glucose POC 01/18/2017 166  mg/dL Final    Hemoglobin A1c (POC) 01/18/2017 8.5  % Final     Assessment/Plan & Orders:         ICD-10-CM ICD-9-CM    1. Uncontrolled type 2 diabetes mellitus without complication, without long-term current use of insulin (HCC) E11.65 250.02 AMB POC GLUCOSE, QUANTITATIVE, BLOOD   2. Dizziness R42 780.4 meclizine (ANTIVERT) 12.5 mg tablet   3.  Essential hypertension I10 401.9      Healthy lifestyle has been encouraged including avoidance of tobacco, limiting or avoiding alcohol intake, heart healthy diet which is low in cholesterol and saturated fat and contains fresh fruits, vegetables and whole grains and fiber, regular exercise with goals of 20-30 minutes 3-5 days weekly and maintaining an optimal BMI. Pt to get eye exam. Form given at last visit  Pt to check BS at home BID and bring BS log to next visit  Can stop ergocalciferol and take maintenance calcium/Vit D    Follow-up Disposition:  Return in about 2 weeks (around 3/30/2017) for Follow up hypertension, Follow up diabetes mellitus, Follow up hyperlipidemia. *Patient verbalized understanding and agreement with the plan. Patient was given an after-visit summary. Camron Murphy.  [address], MD - Internal Medicine  3/19/2017, 11:20 AM  [facility]  [address] [place], [address]  Phone [phone]  Fax [phone]

## 2017-03-16 NOTE — MR AVS SNAPSHOT
Visit Information Date & Time Provider Department Dept. Phone Encounter #  
 3/16/2017  3:30 PM Julee Hurt MD [facility] [phone] [id number] Follow-up Instructions Return in about 2 weeks (around 3/30/2017) for Follow up hypertension, Follow up diabetes mellitus, Follow up hyperlipidemia. Your Appointments 4/19/2017 10:30 AM  
Follow Up with Julee Hurt MD  
[facility]) Appt Note: 3 month follow up [address] [facility] [phone]  
[address] [facility] [address] Upcoming Health Maintenance Date Due  
 EYE EXAM RETINAL OR DILATED Q1 12/14/1942 ZOSTER VACCINE AGE 60> 12/14/1992 GLAUCOMA SCREENING Q2Y 12/14/1997 Pneumococcal 65+ Low/Medium Risk (2 of 2 - PPSV23) 11/2/2014 HEMOGLOBIN A1C Q6M 9/1/2017 FOOT EXAM Q1 10/12/2017 MICROALBUMIN Q1 10/12/2017 MEDICARE YEARLY EXAM 10/13/2017 LIPID PANEL Q1 3/1/2018 DTaP/Tdap/Td series (2 - Td) 12/10/2020 Allergies as of 3/16/2017  Review Complete On: 3/16/2017 By: Julee Hurt MD  
 No Known Allergies Current Immunizations  Never Reviewed Name Date Influenza Vaccine 10/15/2015, 12/31/2013, 1/2/2013, 11/2/2009 Influenza Vaccine (Quad) PF 10/3/2016 Pneumococcal Vaccine (Unspecified Type) 11/2/2009 Tdap 12/10/2010 Not reviewed this visit You Were Diagnosed With   
  
 Codes Comments Uncontrolled type 2 diabetes mellitus without complication, without long-term current use of insulin ([facility] [address]    -  Primary ICD-10-CM: E11.65 ICD-9-CM: 250.02 Dizziness     ICD-10-CM: M40 ICD-9-CM: 780.4 Essential hypertension     ICD-10-CM: I10 
ICD-9-CM: 401.9 Vitals  BP Pulse Temp Resp Height(growth percentile) Weight(growth percentile)  
 138/68 (BP 1 Location: Left arm, BP Patient Position: Sitting) 67 97.2 °F (36.2 °C) (Oral) 16 4' 11\" (1.499 m) 134 lb 12.8 oz (61.1 kg) SpO2 BMI OB Status Smoking Status 98% 27.23 kg/m2 Menopause Former Smoker BMI and BSA Data Body Mass Index Body Surface Area  
 27.23 kg/m 2 1.59 m 2 Preferred Pharmacy Pharmacy Name Phone [id number] [address], [address] [place] [address] [phone] Your Updated Medication List  
  
   
This list is accurate as of: 3/16/17  4:58 PM.  Always use your most recent med list.  
  
  
  
  
 * aspirin 81 mg chewable tablet 81 mg.  
  
 * aspirin delayed-release 81 mg tablet Take  by mouth daily. atenolol 50 mg tablet Commonly known as:  TENORMIN Take 1 Tab by mouth daily. ergocalciferol 50,000 unit capsule Commonly known as:  ERGOCALCIFEROL Take 1 Cap by mouth every seven (7) days. HYDROcodone-acetaminophen 5-325 mg per tablet Commonly known as:  Otis Sarai Take 1 Tab by Mouth Every 4 Hours As Needed. meclizine 12.5 mg tablet Commonly known as:  ANTIVERT Take 1 Tab by mouth three (3) times daily as needed for up to 10 days. metFORMIN  mg tablet Commonly known as:  GLUCOPHAGE XR Take 2 Tabs by mouth daily. NIFEdipine ER 90 mg ER tablet Commonly known as:  ADALAT CC Take 1 Tab by mouth daily. pravastatin 40 mg tablet Commonly known as:  PRAVACHOL Take 1 Tab by mouth nightly. valsartan 320 mg tablet Commonly known as:  DIOVAN  
  
 * Notice: This list has 2 medication(s) that are the same as other medications prescribed for you. Read the directions carefully, and ask your doctor or other care provider to review them with you. Prescriptions Sent to Pharmacy Refills  
 meclizine (ANTIVERT) 12.5 mg tablet 3 Sig: Take 1 Tab by mouth three (3) times daily as needed for up to 10 days. Class: Normal  
 Pharmacy: [address] [place] [address], [address] [place] [address]  #: [phone] Route: Oral  
  
We Performed the Following AMB POC GLUCOSE, QUANTITATIVE, BLOOD [52963 CPT(R)] Follow-up Instructions Return in about 2 weeks (around 3/30/2017) for Follow up hypertension, Follow up diabetes mellitus, Follow up hyperlipidemia. Patient Instructions Epley Maneuver for Vertigo: Exercises Your Care Instructions The Epley Maneuver is a series of movements your doctor may use to treat your vertigo. Here are the steps for the exercises. Your doctor or physical therapist will guide you through the movements. A single 10- to 15-minute session often is all that's needed. Crystal debris (canaliths) cause the vertigo. When your head is moved into different positions, the debris moves freely. This may cause your symptoms to stop. How to do the exercises Step 1 
 
· You will sit on the doctor's exam table. Your legs will be out in front of you. The doctor or physical therapist will turn your head so that it is long term between looking straight ahead and looking to the side that causes the worst vertigo. · Without changing your head position, he or she will guide you back quickly. Your shoulders will be on the table. Your head will hang over the edge of the table. At this point, the side of your head that is causing the worst vertigo will face the floor. You'll stay in this position for 30 seconds or until your symptoms stop. Step 2 · Then, the doctor or physical therapist will turn your head to the other side. You don't need to lift your head. The other side of your head will face the floor. You will stay in this position for 30 seconds or until your symptoms stop. Step 3 · The doctor or physical therapist will help you roll your body in the same direction that your head is facing. You will lie on your side. (For example, if you are looking to your right, you will roll onto your right side.) The side that causes the worst symptoms should be facing up.  Kizzy Mcdaniel stay in this position for another 30 seconds or until your symptoms stop. Step 4 · The doctor or physical therapist will then help you to sit back up. Your legs will hang off the table on the same side that you were facing. Follow-up care is a key part of your treatment and safety. Be sure to make and go to all appointments, and call your doctor if you are having problems. It's also a good idea to know your test results and keep a list of the medicines you take. Where can you learn more? Go to http://shashank-louie.info/. Enter D699 in the search box to learn more about \"Epley Maneuver for Vertigo: Exercises. \" Current as of: July 29, 2016 Content Version: 11.1 © 1448-9904 [employer]. Care instructions adapted under license by [employer] (which disclaims liability or warranty for this information). If you have questions about a medical condition or this instruction, always ask your healthcare professional. [address] any warranty or liability for your use of this information. Please provide this summary of care documentation to your next provider. Your primary care clinician is listed as Adelina Caruso. If you have any questions after today's visit, please call [phone].

## 2017-03-22 ENCOUNTER — OFFICE VISIT (OUTPATIENT)
Dept: FAMILY MEDICINE CLINIC | Facility: CLINIC | Age: 82
End: 2017-03-22

## 2017-03-22 VITALS
RESPIRATION RATE: 14 BRPM | SYSTOLIC BLOOD PRESSURE: 145 MMHG | DIASTOLIC BLOOD PRESSURE: 80 MMHG | TEMPERATURE: 97.8 F | HEIGHT: 59 IN | OXYGEN SATURATION: 100 % | WEIGHT: 133.2 LBS | BODY MASS INDEX: 26.85 KG/M2 | HEART RATE: 65 BPM

## 2017-03-22 DIAGNOSIS — R42 DIZZINESS: Primary | ICD-10-CM

## 2017-03-22 DIAGNOSIS — I10 ESSENTIAL HYPERTENSION: ICD-10-CM

## 2017-03-22 DIAGNOSIS — R53.1 WEAKNESS GENERALIZED: ICD-10-CM

## 2017-03-22 DIAGNOSIS — R26.9 GAIT ABNORMALITY: ICD-10-CM

## 2017-03-22 RX ORDER — VALSARTAN 320 MG/1
320 TABLET ORAL DAILY
Qty: 90 TAB | Refills: 3 | Status: SHIPPED | OUTPATIENT
Start: 2017-03-22 | End: 2017-04-21 | Stop reason: ALTCHOICE

## 2017-03-22 RX ORDER — AMLODIPINE BESYLATE 10 MG/1
10 TABLET ORAL DAILY
Qty: 90 TAB | Refills: 3 | Status: SHIPPED | OUTPATIENT
Start: 2017-03-22 | End: 2017-04-21

## 2017-03-22 RX ORDER — MULTIVITAMIN
1 TABLET ORAL 2 TIMES DAILY
Qty: 180 TAB | Refills: 3 | Status: SHIPPED | OUTPATIENT
Start: 2017-03-22 | End: 2018-11-20 | Stop reason: SDUPTHER

## 2017-03-22 NOTE — PROGRESS NOTES
Internal Medicine Progress Note    Today's Date:  3/22/2017   Patient:  Brendan Gao  Patient :  1932    Subjective:     Chief Complaint   Patient presents with    Medication Reaction    Dizziness      Dizziness/Bell's palsy  This is an acute problem. This is not at goal. Symptoms started three weeks ago. Pt was seen in the ED and treated with an antiviral and prednisone. Pt c/o dizziness, generalized weakness, gait imbalance and ecreased appetite. Right facial droop persists but is improved since last visit. Pt has a right ear fullness sensation. Hypertension   This is a chronic problem. BP is not at goal.  Pt takes diovan, atenolol and nifedipine. Pt reports compliance with these medications.      Osteopenia  This is a chronic problem. This is not at goal. Pt is off fosamax. Pt was on fosamax for two years. Last dexa scan was in . Pt is on ergocalciferol for vitamin D deficiency.       Diabetes mellitus  This is a chronic problem. This is not controlled. Pt takes metformin. Pt is on aspirin and statin.  +microalbuminuria. BS log reviewed.       Past Medical History:   Diagnosis Date    Advance directive discussed with patient 10/12/2016    Cigarette nicotine dependence in remission 10/12/2016    Diabetes ([address]     Dizziness 3/16/2017    Essential hypertension 10/3/2016    Hypertension     Microalbuminuria 2017    Osteopenia 10/3/2016    Osteoporosis     Pure hypercholesterolemia 2017    Type II diabetes mellitus ([address] 10/3/2016    Vitamin D deficiency 2017     History reviewed. No pertinent surgical history. reports that she has never smoked. She has never used smokeless tobacco. She reports that she does not drink alcohol or use illicit drugs.   Family History   Problem Relation Age of Onset    Hypertension Mother     Cancer Mother      pancreas    Cancer Father      lung     No Known Allergies  Review of Systems   Positives in bold  CV:      chest pain, palpitations  PULM:  SOB, wheezing, cough, sputum production    Current Outpatient Meds and Allergies     Current Outpatient Prescriptions on File Prior to Visit   Medication Sig Dispense Refill    atenolol (TENORMIN) 50 mg tablet TAKE ONE TABLET BY MOUTH ONE TIME DAILY 90 Tab 3    aspirin delayed-release 81 mg tablet Take  by mouth daily.  metFORMIN ER (GLUCOPHAGE XR) 500 mg tablet Take 2 Tabs by mouth daily. (Patient taking differently: Take 500 mg by mouth daily. ) 180 Tab 3    pravastatin (PRAVACHOL) 40 mg tablet Take 1 Tab by mouth nightly. 90 Tab 3     No current facility-administered medications on file prior to visit. No Known Allergies  Objective:     VS:    Visit Vitals    /80 (BP 1 Location: Right arm, BP Patient Position: Sitting)    Pulse 65    Temp 97.8 °F (36.6 °C) (Oral)    Resp 14    Ht 4' 11\" (1.499 m)    Wt 133 lb 3.2 oz (60.4 kg)    SpO2 100%    BMI 26.9 kg/m2     General:   Well-nourished, well-groomed, pleasant, alert, in no acute distress  Head:  Normocephalic, atraumatic, MMM, good dentition, oropharynx WNL without membranes, exudates, petechiae or ulcers  Ears:  External ears WNL, TMs WNL  Eyes:  EOMI, PERRL  Nose:  External nares WNL  Neuro:   Alert, conversant, appropriate, following commands, +right facial droop, able to raise both eyebrows   Psych:  No pressured speech, no abnormal thought content    Office Visit on 03/16/2017   Component Date Value Ref Range Status    Glucose POC 03/16/2017 123  mg/dL Final   Hospital Outpatient Visit on 03/01/2017   Component Date Value Ref Range Status    LIPID PROFILE 03/01/2017        Final    Cholesterol, total 03/01/2017 249* <200 MG/DL Final    Triglyceride 03/01/2017 109  <150 MG/DL Final    Comment: The drugs N-acetylcysteine (NAC) and  Metamiszole have been found to cause falsely  low results in this chemical assay.  Please  be sure to submit blood samples obtained  BEFORE administration of either of these  drugs to assure correct results.  HDL Cholesterol 03/01/2017 58  40 - 60 MG/DL Final    LDL, calculated 03/01/2017 169.2* 0 - 100 MG/DL Final    VLDL, calculated 03/01/2017 21.8  MG/DL Final    CHOL/HDL Ratio 03/01/2017 4.3  0 - 5.0   Final    Sodium 03/01/2017 137  136 - 145 mmol/L Final    Potassium 03/01/2017 4.0  3.5 - 5.5 mmol/L Final    Chloride 03/01/2017 102  100 - 108 mmol/L Final    CO2 03/01/2017 24  21 - 32 mmol/L Final    Anion gap 03/01/2017 11  3.0 - 18 mmol/L Final    Glucose 03/01/2017 215* 74 - 99 mg/dL Final    BUN 03/01/2017 14  7.0 - 18 MG/DL Final    Creatinine 03/01/2017 0.87  0.6 - 1.3 MG/DL Final    BUN/Creatinine ratio 03/01/2017 16  12 - 20   Final    GFR est AA 03/01/2017 >60  >60 ml/min/1.73m2 Final    GFR est non-AA 03/01/2017 >60  >60 ml/min/1.73m2 Final    Comment: (NOTE)  Estimated GFR is calculated using the Modification of Diet in Renal   Disease (MDRD) Study equation, reported for both  Americans   (GFRAA) and non- Americans (GFRNA), and normalized to 1.73m2   body surface area. The physician must decide which value applies to   the patient. The MDRD study equation should only be used in   individuals age 25 or older. It has not been validated for the   following: pregnant women, patients with serious comorbid conditions,   or on certain medications, or persons with extremes of body size,   muscle mass, or nutritional status.  Calcium 03/01/2017 8.4* 8.5 - 10.1 MG/DL Final    Bilirubin, total 03/01/2017 0.3  0.2 - 1.0 MG/DL Final    ALT (SGPT) 03/01/2017 22  13 - 56 U/L Final    AST (SGOT) 03/01/2017 13* 15 - 37 U/L Final    Alk.  phosphatase 03/01/2017 73  45 - 117 U/L Final    Protein, total 03/01/2017 7.4  6.4 - 8.2 g/dL Final    Albumin 03/01/2017 3.7  3.4 - 5.0 g/dL Final    Globulin 03/01/2017 3.7  2.0 - 4.0 g/dL Final    A-G Ratio 03/01/2017 1.0  0.8 - 1.7   Final    Vitamin D 25-Hydroxy 03/01/2017 29.5* 30 - 100 ng/mL Final Comment: (NOTE)  Deficiency               <20 ng/mL  Insufficiency          20-30 ng/mL  Sufficient             ng/mL  Possible toxicity       >100 ng/mL    The Method used is Siemens Advia Centaur currently standardized to a   Center of Disease Control and Prevention (CDC) certified reference   [address]. Samples containing fluorescein dye can produce falsely   elevated values when tested with the ADVIA Centaur Vitamin D Assay. It is recommended that results in the toxic range, >100 ng/mL, be   retested 72 hours post fluorescein exposure.  Hemoglobin A1c 03/01/2017 8.6* 4.2 - 5.6 % Final    Comment: (NOTE)  HbA1C Interpretive Ranges  <5.7              Normal  5.7 - 6.4         Consider Prediabetes  >6.5              Consider Diabetes      Est. average glucose 03/01/2017 200  mg/dL Final    Comment: (NOTE)  The eAG should be interpreted with patient characteristics in mind   since ethnicity, interindividual differences, red cell lifespan,   variation in rates of glycation, etc. may affect the validity of the   calculation. Hospital Outpatient Visit on 01/18/2017   Component Date Value Ref Range Status    Sodium 01/18/2017 138  136 - 145 mmol/L Final    Potassium 01/18/2017 3.7  3.5 - 5.5 mmol/L Final    Chloride 01/18/2017 102  100 - 108 mmol/L Final    CO2 01/18/2017 28  21 - 32 mmol/L Final    Anion gap 01/18/2017 8  3.0 - 18 mmol/L Final    Glucose 01/18/2017 177* 74 - 99 mg/dL Final    BUN 01/18/2017 11  7.0 - 18 MG/DL Final    Creatinine 01/18/2017 0.77  0.6 - 1.3 MG/DL Final    BUN/Creatinine ratio 01/18/2017 14  12 - 20   Final    GFR est AA 01/18/2017 >60  >60 ml/min/1.73m2 Final    GFR est non-AA 01/18/2017 >60  >60 ml/min/1.73m2 Final    Comment: (NOTE)  Estimated GFR is calculated using the Modification of Diet in Renal   Disease (MDRD) Study equation, reported for both  Americans   (GFRAA) and non- Americans (GFRNA), and normalized to 1.73m2   body surface area. The physician must decide which value applies to   the patient. The MDRD study equation should only be used in   individuals age 25 or older. It has not been validated for the   following: pregnant women, patients with serious comorbid conditions,   or on certain medications, or persons with extremes of body size,   muscle mass, or nutritional status.  Calcium 01/18/2017 8.2* 8.5 - 10.1 MG/DL Final    Bilirubin, total 01/18/2017 0.3  0.2 - 1.0 MG/DL Final    ALT (SGPT) 01/18/2017 24  13 - 56 U/L Final    AST (SGOT) 01/18/2017 16  15 - 37 U/L Final    Alk. phosphatase 01/18/2017 74  45 - 117 U/L Final    Protein, total 01/18/2017 7.1  6.4 - 8.2 g/dL Final    Albumin 01/18/2017 3.8  3.4 - 5.0 g/dL Final    Globulin 01/18/2017 3.3  2.0 - 4.0 g/dL Final    A-G Ratio 01/18/2017 1.2  0.8 - 1.7   Final   Office Visit on 01/18/2017   Component Date Value Ref Range Status    Glucose POC 01/18/2017 166  mg/dL Final    Hemoglobin A1c (POC) 01/18/2017 8.5  % Final     Assessment/Plan & Orders:         ICD-10-CM ICD-9-CM    1. Dizziness R42 780.4 REFERRAL TO NEUROLOGY      MRI BRAIN W WO CONT   2. Gait abnormality R26.9 781.2 REFERRAL TO NEUROLOGY      MRI BRAIN W WO CONT   3. Weakness generalized R53.1 780.79 REFERRAL TO NEUROLOGY      MRI BRAIN W WO CONT   4. Essential hypertension I10 401.9 valsartan (DIOVAN) 320 mg tablet      amLODIPine (NORVASC) 10 mg tablet   5. Uncontrolled type 2 diabetes mellitus without complication, without long-term current use of insulin (HCC) E11.65 250.02 glucose blood VI test strips (BLOOD GLUCOSE TEST) strip     Healthy lifestyle has been encouraged including avoidance of tobacco, limiting or avoiding alcohol intake, heart healthy diet which is low in cholesterol and saturated fat and contains fresh fruits, vegetables and whole grains and fiber, regular exercise with goals of 20-30 minutes 3-5 days weekly and maintaining an optimal BMI.    Pt to get eye exam. Form given at last visit  Pt to check BS at home BID and bring BS log to next visit  Can stop ergocalciferol and take maintenance calcium/Vit D   Stop nifedipine    Follow-up Disposition:  Return in about 1 week (around 3/29/2017) for Follow up hypertension, Go over lab/imaging results. *Patient verbalized understanding and agreement with the plan. Patient was given an after-visit summary. Claude Meyers.  [address] [facility] - Internal Medicine  3/22/2017, 11:20 AM  [address]  [address] TERI Abdi, [address]  Phone [phone]  Fax [phone]

## 2017-03-22 NOTE — PATIENT INSTRUCTIONS
Dizziness: Care Instructions  Your Care Instructions  Dizziness is the feeling of unsteadiness or fuzziness in your head. It is different than having vertigo, which is a feeling that the room is spinning or that you are moving or falling. It is also different from lightheadedness, which is the feeling that you are about to faint. It can be hard to know what causes dizziness. Some people feel dizzy when they have migraine headaches. Sometimes bouts of flu can make you feel dizzy. Some medical conditions, such as heart problems or high blood pressure, can make you feel dizzy. Many medicines can cause dizziness, including medicines for high blood pressure, pain, or anxiety. If a medicine causes your symptoms, your doctor may recommend that you stop or change the medicine. If it is a problem with your heart, you may need medicine to help your heart work better. If there is no clear reason for your symptoms, your doctor may suggest watching and waiting for a while to see if the dizziness goes away on its own. Follow-up care is a key part of your treatment and safety. Be sure to make and go to all appointments, and call your doctor if you are having problems. It's also a good idea to know your test results and keep a list of the medicines you take. How can you care for yourself at home? · If your doctor recommends or prescribes medicine, take it exactly as directed. Call your doctor if you think you are having a problem with your medicine. · Do not drive while you feel dizzy. · Try to prevent falls. Steps you can take include:  ¨ Using nonskid mats, adding grab bars near the tub, and using night-lights. ¨ Clearing your home so that walkways are free of anything you might trip on. ¨ Letting family and friends know that you have been feeling dizzy. This will help them know how to help you. When should you call for help? Call 911 anytime you think you may need emergency care.  For example, call if:  · You passed out (lost consciousness). · You have dizziness along with symptoms of a heart attack. These may include:  ¨ Chest pain or pressure, or a strange feeling in the chest.  ¨ Sweating. ¨ Shortness of breath. ¨ Nausea or vomiting. ¨ Pain, pressure, or a strange feeling in the back, neck, jaw, or upper belly or in one or both shoulders or arms. ¨ Lightheadedness or sudden weakness. ¨ A fast or irregular heartbeat. · You have symptoms of a stroke. These may include:  ¨ Sudden numbness, tingling, weakness, or loss of movement in your face, arm, or leg, especially on only one side of your body. ¨ Sudden vision changes. ¨ Sudden trouble speaking. ¨ Sudden confusion or trouble understanding simple statements. ¨ Sudden problems with walking or balance. ¨ A sudden, severe headache that is different from past headaches. Call your doctor now or seek immediate medical care if:  · You feel dizzy and have a fever, headache, or ringing in your ears. · You have new or increased nausea and vomiting. · Your dizziness does not go away or comes back. Watch closely for changes in your health, and be sure to contact your doctor if:  · You do not get better as expected. Where can you learn more? Go to http://shashank-louie.info/. Enter N610 in the search box to learn more about \"Dizziness: Care Instructions. \"  Current as of: May 27, 2016  Content Version: 11.1  © 8291-6315 [employer] Care instructions adapted under license by [employer] (which disclaims liability or warranty for this information). If you have questions about a medical condition or this instruction, always ask your healthcare professional. [place] [address] any warranty or liability for your use of this information.

## 2017-03-22 NOTE — PROGRESS NOTES
Jim Kemp is a 80 y.o.  female presents today for same day sick visit for medication reaction and dizziness. Pt is in Room # 2. This patient is accompanied in the office by her daughter. 1. Have you been to the ER, urgent care clinic since your last visit? Hospitalized since your last visit? No    2. Have you seen or consulted any other health care providers outside of the [address] [place] [address] since your last visit? Include any pap smears or colon screening.  No

## 2017-03-22 NOTE — MR AVS SNAPSHOT
Visit Information Date & Time Provider Department Dept. Phone Encounter #  
 3/22/2017  9:45 AM Thai Hardin MD Mago Pineda  [phone] [id number] Follow-up Instructions Return in about 1 week (around 3/29/2017) for Follow up hypertension, Go over lab/imaging results. Your Appointments 3/30/2017  1:30 PM  
Follow Up with Thai Hardin MD  
[facility]) Appt Note: [address] [facility] [address] [id number]  
[address] [address] 4/19/2017 10:30 AM  
Follow Up with Thai Hardin MD  
[facility]) Appt Note: 3 month follow up [address] [facility] [address] [id number]  
[phone] Upcoming Health Maintenance Date Due  
 EYE EXAM RETINAL OR DILATED Q1 12/14/1942 ZOSTER VACCINE AGE 60> 12/14/1992 GLAUCOMA SCREENING Q2Y 12/14/1997 Pneumococcal 65+ Low/Medium Risk (2 of 2 - PPSV23) 11/2/2014 HEMOGLOBIN A1C Q6M 9/1/2017 FOOT EXAM Q1 10/12/2017 MICROALBUMIN Q1 10/12/2017 MEDICARE YEARLY EXAM 10/13/2017 LIPID PANEL Q1 3/1/2018 DTaP/Tdap/Td series (2 - Td) 12/10/2020 Allergies as of 3/22/2017  Review Complete On: 3/22/2017 By: Thai Hardin MD  
 No Known Allergies Current Immunizations  Never Reviewed Name Date Influenza Vaccine 10/15/2015, 12/31/2013, 1/2/2013, 11/2/2009 Influenza Vaccine (Quad) PF 10/3/2016 Pneumococcal Vaccine (Unspecified Type) 11/2/2009 Tdap 12/10/2010 Not reviewed this visit You Were Diagnosed With   
  
 Codes Comments Dizziness    -  Primary ICD-10-CM: A76 ICD-9-CM: 780.4 Gait abnormality     ICD-10-CM: R26.9 ICD-9-CM: 549. 2 Weakness generalized     ICD-10-CM: R53.1 ICD-9-CM: 780.79 Essential hypertension     ICD-10-CM: I10 
ICD-9-CM: 401.9 Uncontrolled type 2 diabetes mellitus without complication, without long-term current use of insulin ([facility]     ICD-10-CM: E11.65 ICD-9-CM: 250.02 Vitals BP Pulse Temp Resp Height(growth percentile) Weight(growth percentile) 145/80 (BP 1 Location: Right arm, BP Patient Position: Sitting) 65 97.8 °F (36.6 °C) (Oral) 14 4' 11\" (1.499 m) 133 lb 3.2 oz (60.4 kg) SpO2 BMI OB Status Smoking Status 100% 26.9 kg/m2 Menopause Never Smoker Vitals History BMI and BSA Data Body Mass Index Body Surface Area  
 26.9 kg/m 2 1.59 m 2 Preferred Pharmacy Pharmacy Name Phone [facility]/PHARMACY #[id number]- [address] [phone] Your Updated Medication List  
  
   
This list is accurate as of: 3/22/17 10:22 AM.  Always use your most recent med list. amLODIPine 10 mg tablet Commonly known as:  [place] Take 1 Tab by mouth daily. aspirin delayed-release 81 mg tablet Take  by mouth daily. atenolol 50 mg tablet Commonly known as:  TENORMIN  
TAKE ONE TABLET BY MOUTH ONE TIME DAILY  
  
 calcium-cholecalciferol (D3) tablet Commonly known as:  Calcium 600 + D Take 1 Tab by mouth two (2) times a day. glucose blood VI test strips strip Commonly known as:  blood glucose test  
Check BS BID. Dx code: E11.65  
  
 metFORMIN  mg tablet Commonly known as:  GLUCOPHAGE XR Take 2 Tabs by mouth daily. pravastatin 40 mg tablet Commonly known as:  PRAVACHOL Take 1 Tab by mouth nightly. valsartan 320 mg tablet Commonly known as:  DIOVAN Take 1 Tab by mouth daily. Prescriptions Sent to Pharmacy Refills  
 valsartan (DIOVAN) 320 mg tablet 3 Sig: Take 1 Tab by mouth daily. Class: Normal  
 Pharmacy: [address] [place] [address], [address] [place] [address]  #: [phone] Route: Oral  
 amLODIPine (NORVASC) 10 mg tablet 3 Sig: Take 1 Tab by mouth daily. Class: Normal  
 Pharmacy: [address] [place] [address]  #: [phone] Route: Oral  
 glucose blood VI test strips (BLOOD GLUCOSE TEST) strip 3 Sig: Check BS BID. Dx code: E11.65 Class: Normal  
 Pharmacy: [facility] [address] [place] [address] #: [phone]  
 calcium-cholecalciferol, D3, (CALCIUM 600 + D) tablet 3 Sig: Take 1 Tab by mouth two (2) times a day. Class: Normal  
 Pharmacy: [address] [place] [address], [address] [place] [address], [address]  #: [phone] Route: Oral  
  
We Performed the Following REFERRAL TO NEUROLOGY [MTK89 Custom] Comments:  
 Please evaluate patient for recent h/o Bell's palsy, lingering right facial droop, generalized weakness, dizziness, decreased appetite and gait imbalance. Follow-up Instructions Return in about 1 week (around 3/29/2017) for Follow up hypertension, Go over lab/imaging results. To-Do List   
 03/22/2017 Imaging:  MRI BRAIN W WO CONT Referral Information Referral ID Referred By Referred To  
  
 [id number] [facility]   
   [address] [facility], [place] [address] Phone: [phone] Fax: [phone] Visits Status Start Date End Date 1 New Request 3/22/17 3/22/18 If your referral has a status of pending review or denied, additional information will be sent to support the outcome of this decision. Referral ID Referred By Referred To  
 [id number] Emmie Ordonez Not Available Visits Status Start Date End Date 1 New Request 3/22/17 3/22/18 If your referral has a status of pending review or denied, additional information will be sent to support the outcome of this decision. Please provide this summary of care documentation to your next provider. Your primary care clinician is listed as Oneita Maizes.  If you have any questions after today's visit, please call [phone].

## 2017-03-31 ENCOUNTER — TELEPHONE (OUTPATIENT)
Dept: FAMILY MEDICINE CLINIC | Facility: CLINIC | Age: 82
End: 2017-03-31

## 2017-03-31 NOTE — TELEPHONE ENCOUNTER
Received a call from [employer] to provide the Authorization [id number] Valid  17  5/15/17. Called and made aware BS Scheduling and l/m for pt's daughter.

## 2017-04-04 ENCOUNTER — HOSPITAL ENCOUNTER (OUTPATIENT)
Dept: MRI IMAGING | Age: 82
Discharge: HOME | End: 2017-04-04
Attending: INTERNAL MEDICINE
Payer: MEDICARE

## 2017-04-04 DIAGNOSIS — R26.9 GAIT ABNORMALITY: ICD-10-CM

## 2017-04-04 DIAGNOSIS — R53.1 WEAKNESS GENERALIZED: ICD-10-CM

## 2017-04-04 DIAGNOSIS — R42 DIZZINESS: ICD-10-CM

## 2017-04-04 LAB — CREAT UR-MCNC: 0.6 MG/DL (ref 0.6–1.3)

## 2017-04-04 PROCEDURE — 70553 MRI BRAIN STEM W/O & W/DYE: CPT

## 2017-04-04 PROCEDURE — 82565 ASSAY OF CREATININE: CPT

## 2017-04-04 PROCEDURE — 74011250636 HC RX REV CODE- 250/636: Performed by: INTERNAL MEDICINE

## 2017-04-04 PROCEDURE — A9585 GADOBUTROL INJECTION: HCPCS | Performed by: INTERNAL MEDICINE

## 2017-04-04 RX ADMIN — GADOBUTROL 6 ML: 604.72 INJECTION INTRAVENOUS at 08:35

## 2017-04-14 ENCOUNTER — TELEPHONE (OUTPATIENT)
Dept: FAMILY MEDICINE CLINIC | Facility: CLINIC | Age: 82
End: 2017-04-14

## 2017-04-14 NOTE — TELEPHONE ENCOUNTER
Spoke with Nai Flores, Verified 2 patient identifiers. Spoke with patient in regards to MRI results. Relayed Doctor's notes. Patient acknowledges understanding and voices no further questions or concerns at this time    Patient also made aware of her upcoming appointment next week on [email or web address].

## 2017-04-21 ENCOUNTER — HOSPITAL ENCOUNTER (OUTPATIENT)
Dept: LAB | Age: 82
Discharge: HOME | End: 2017-04-21
Payer: MEDICARE

## 2017-04-21 ENCOUNTER — OFFICE VISIT (OUTPATIENT)
Dept: FAMILY MEDICINE CLINIC | Facility: CLINIC | Age: 82
End: 2017-04-21

## 2017-04-21 VITALS
DIASTOLIC BLOOD PRESSURE: 100 MMHG | WEIGHT: 132 LBS | SYSTOLIC BLOOD PRESSURE: 162 MMHG | TEMPERATURE: 97.9 F | HEIGHT: 59 IN | RESPIRATION RATE: 16 BRPM | OXYGEN SATURATION: 99 % | BODY MASS INDEX: 26.61 KG/M2 | HEART RATE: 55 BPM

## 2017-04-21 DIAGNOSIS — R01.1 HEART MURMUR: ICD-10-CM

## 2017-04-21 DIAGNOSIS — I10 ESSENTIAL HYPERTENSION: Primary | ICD-10-CM

## 2017-04-21 DIAGNOSIS — R42 DIZZINESS: ICD-10-CM

## 2017-04-21 LAB
ALBUMIN SERPL BCP-MCNC: 3.8 G/DL (ref 3.4–5)
ALBUMIN/GLOB SERPL: 1.3 {RATIO} (ref 0.8–1.7)
ALP SERPL-CCNC: 66 U/L (ref 45–117)
ALT SERPL-CCNC: 23 U/L (ref 13–56)
ANION GAP BLD CALC-SCNC: 9 MMOL/L (ref 3–18)
AST SERPL W P-5'-P-CCNC: 13 U/L (ref 15–37)
BILIRUB SERPL-MCNC: 0.2 MG/DL (ref 0.2–1)
BUN SERPL-MCNC: 10 MG/DL (ref 7–18)
BUN/CREAT SERPL: 15 (ref 12–20)
CALCIUM SERPL-MCNC: 9 MG/DL (ref 8.5–10.1)
CHLORIDE SERPL-SCNC: 104 MMOL/L (ref 100–108)
CO2 SERPL-SCNC: 27 MMOL/L (ref 21–32)
CREAT SERPL-MCNC: 0.65 MG/DL (ref 0.6–1.3)
EST. AVERAGE GLUCOSE BLD GHB EST-MCNC: 203 MG/DL
GLOBULIN SER CALC-MCNC: 3 G/DL (ref 2–4)
GLUCOSE SERPL-MCNC: 141 MG/DL (ref 74–99)
HBA1C MFR BLD: 8.7 % (ref 4.2–5.6)
POTASSIUM SERPL-SCNC: 3.8 MMOL/L (ref 3.5–5.5)
PROT SERPL-MCNC: 6.8 G/DL (ref 6.4–8.2)
SODIUM SERPL-SCNC: 140 MMOL/L (ref 136–145)

## 2017-04-21 PROCEDURE — 80053 COMPREHEN METABOLIC PANEL: CPT | Performed by: PHYSICIAN ASSISTANT

## 2017-04-21 PROCEDURE — 36415 COLL VENOUS BLD VENIPUNCTURE: CPT | Performed by: PHYSICIAN ASSISTANT

## 2017-04-21 PROCEDURE — 83036 HEMOGLOBIN GLYCOSYLATED A1C: CPT | Performed by: PHYSICIAN ASSISTANT

## 2017-04-21 RX ORDER — VALSARTAN AND HYDROCHLOROTHIAZIDE 320; 25 MG/1; MG/1
1 TABLET, FILM COATED ORAL DAILY
Qty: 30 TAB | Refills: 1 | Status: SHIPPED | OUTPATIENT
Start: 2017-04-21 | End: 2017-06-09 | Stop reason: SDUPTHER

## 2017-04-21 NOTE — PROGRESS NOTES
History and Physical    Patient: Danuta Lopez MRN: [id number]  SSN: [id number]    YOB: 1932  Age: 80 y.o. Sex: female      Subjective:      Danuta Lopez is a 80 y.o. female who presents today for follow up HTN, DM, dizziness/gait changes/weakness etc.    In terms of her HTN, she is currently on Norvasc, Diovan and atenolol. Patient was previously taking Nifedipine but this was switched to Norvasc. Patient states this was done to see if the switch would slightly lower her BP, her BP is much higher today. Patient admits to feeling anxious when she comes into the office, she has been monitoring her BP at home and her levels have been less than 140/90. In terms of her dizziness/gait changes/weakness, this seems to have stemmed when she had a recent episode of [place] Palsy. Patient has been referred to neurology and an MRI was done. MRI showed 2 chronic infarcts and microvascular changes, nothing acute. She states that her weakness and dizziness has resolved. Patient has noticed mild unsteady gait, but denies falls or near falls. Patient does not walk with a device. Patient on ASA and statin. Patient has a h/o DM, she is currently on metformin. She was suppose to have eyes examined, has not had this done as of yet. She checks her FBS regularly, she states that they have been around 160. Patient has stopped her Vitamin D 50 k and has started vitamin D and calcium OTC.     Last Colonoscopy:  past screening age  Last DEXA: 4/2105-osteopenia  Last Mammogram: [id number] 2  Last PAP:  Past screening age      PMH:  Past Medical History:   Diagnosis Date    Advance directive discussed with patient 10/12/2016    Cigarette nicotine dependence in remission 10/12/2016    Diabetes ([facility]     Dizziness 3/16/2017    Essential hypertension 10/3/2016    Hypertension     Microalbuminuria 1/18/2017    Osteopenia 10/3/2016    Osteoporosis     Pure hypercholesterolemia 1/18/2017    Type II diabetes mellitus ([facility] 10/3/2016    Vitamin D deficiency 1/18/2017     No past surgical history on file. FamHx:  Family History   Problem Relation Age of Onset    Hypertension Mother     Cancer Mother      pancreas    Cancer Father      lung       SocialHx:  Social History   Substance Use Topics    Smoking status: Never Smoker    Smokeless tobacco: Never Used    Alcohol use No        Meds:  Prior to Admission medications    Medication Sig Start Date End Date Taking? Authorizing Provider   valsartan-hydroCHLOROthiazide (DIOVAN-HCT) 320-25 mg per tablet Take 1 Tab by mouth daily. 4/21/17  Yes Jessica Pumphrey V, PA   amLODIPine (NORVASC) 10 mg tablet Take 1 Tab by mouth daily. 3/22/17  Yes Tara Essex, MD   glucose blood VI test strips (BLOOD GLUCOSE TEST) strip Check BS BID. Dx code: E11.65 3/22/17  Yes Tara Essex, MD   calcium-cholecalciferol, D3, (CALCIUM 600 + D) tablet Take 1 Tab by mouth two (2) times a day. 3/22/17  Yes Tara Essex, MD   atenolol (TENORMIN) 50 mg tablet TAKE ONE TABLET BY MOUTH ONE TIME DAILY 3/21/17  Yes Tara Essex, MD   aspirin delayed-release 81 mg tablet Take  by mouth daily. Yes Historical Provider   metFORMIN ER (GLUCOPHAGE XR) 500 mg tablet Take 2 Tabs by mouth daily. Patient taking differently: Take 500 mg by mouth daily. 1/18/17  Yes Tara Essex, MD   pravastatin (PRAVACHOL) 40 mg tablet Take 1 Tab by mouth nightly.  1/18/17  Yes Tara Essex, MD        Allergies:  No Known Allergies    Review of Systems:  Items in Bold are positive  Constitutional: negative for fevers, chills and malaise  Eyes: negative for visual disturbance  Ears, Nose, Mouth, Throat, and Face: negative for nasal congestion  Respiratory: negative for cough or SOB  Cardiovascular: negative for chest pain, chest pressure/discomfort  Gastrointestinal: negative for nausea, vomiting, melena, diarrhea, constipation and abdominal pain  Genitourinary:negative for frequency, dysuria or hematuria  Musculoskeletal:negative for myalgias and arthralgias  Neurological: mild unsteady gait, negative for headaches, dizziness and paresthesia    Objective:     Visit Vitals    BP (!) 162/100 (BP 1 Location: Right arm, BP Patient Position: Sitting)    Pulse (!) 55    Temp 97.9 °F (36.6 °C) (Oral)    Resp 16    Ht 4' 11\" (1.499 m)    Wt 132 lb (59.9 kg)    SpO2 99%    BMI 26.66 kg/m2       Physical Exam:  GENERAL: alert, cooperative, no distress, appears stated age  HEENT: EYE: conjunctivae/corneas clear. PERRL, EOM's intact. LUNG: clear to auscultation bilaterally  HEART: 2/6 harsh murmur,  regular rate and rhythm, S1, S2 normal, no click, rub or gallop  EXTREMITIES:  upper and lower extremity strength 5/5 bilaterally no edema  NEUROLOGIC: AOx3. Gait normal.  strength intact bilaterally CN 2-12 grossly intact, no facial droop    Imaging:  MRI Brain 4/4/17  IMPRESSION:     1. No acute findings.     2. 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     3. Low-grade chronic microvascular changes. Assessment and Plan:       ICD-10-CM ICD-9-CM    1. Essential hypertension K94 094.4 METABOLIC PANEL, COMPREHENSIVE      valsartan-hydroCHLOROthiazide (DIOVAN-HCT) 320-25 mg per tablet      METABOLIC PANEL, COMPREHENSIVE   2. Uncontrolled type 2 diabetes mellitus without complication, without long-term current use of insulin (HCC) E11.65 250.02 HEMOGLOBIN A1C WITH EAG      HEMOGLOBIN A1C WITH EAG   3. Dizziness R42 780.4    4.  Heart murmur R01.1 785.2 2D ECHO COMPLETE ADULT (TTE) W OR WO CONTR         Medical Decision Making:  HTN- stop Norvasc, start Diovan-HCTZ- patient and daughter wanted to reduce pill burden and max out current therapy before adding additional agents on     DM- labs- needs follow up    Dizziness/weakness/gait changes- continue to monitor- has improved- patient declines cane at this time as she feels she is walking ok and has not had concerns for falls    Heart murmur- echo- patient asymptomatic - patient denies having had a history of this      Follow-up Disposition:  Return in about 2 weeks (around 5/5/2017) for routine care with me. Patient acknowledges understanding of instructions and acknowledges understanding to call back if current symptoms worsen or new symptoms arise. Patient acknowledges and agrees with plan.         Signed By: ANNA Schaefer     April 21, 2017

## 2017-04-21 NOTE — PROGRESS NOTES
Beka Crowe is a 80 y.o. female presents today for follow-up. Depression Screening: Completed  Fall Risk Screening: Completed      1. Have you been to the ER, urgent care clinic since your last visit? Hospitalized since your last visit? No    2. Have you seen or consulted any other health care providers outside of the [address] [place] [address] since your last visit? Include any pap smears or colon screening. No     Health Maintenance reviewed deferred.

## 2017-04-21 NOTE — PATIENT INSTRUCTIONS

## 2017-04-21 NOTE — MR AVS SNAPSHOT
Visit Information Date & Time Provider Department Dept. Phone Encounter #  
 4/21/2017  1:00 PM Mago Hayes [phone] [id number] Follow-up Instructions Return in about 2 weeks (around 5/5/2017) for routine care with me. Upcoming Health Maintenance Date Due  
 GLAUCOMA SCREENING Q2Y 12/14/1997 Pneumococcal 65+ Low/Medium Risk (2 of 2 - PPSV23) 11/2/2014 EYE EXAM RETINAL OR DILATED Q1 5/31/2017* HEMOGLOBIN A1C Q6M 9/1/2017 FOOT EXAM Q1 10/12/2017 MICROALBUMIN Q1 10/12/2017 MEDICARE YEARLY EXAM 10/13/2017 LIPID PANEL Q1 3/1/2018 DTaP/Tdap/Td series (2 - Td) 12/10/2020 *Topic was postponed. The date shown is not the original due date. Allergies as of 4/21/2017  Review Complete On: 4/21/2017 By: Chuy Reyes No Known Allergies Current Immunizations  Never Reviewed Name Date Influenza Vaccine 10/15/2015, 12/31/2013, 1/2/2013, 11/2/2009 Influenza Vaccine (Quad) PF 10/3/2016 Pneumococcal Vaccine (Unspecified Type) 11/2/2009 Tdap 12/10/2010 Not reviewed this visit You Were Diagnosed With   
  
 Codes Comments Essential hypertension    -  Primary ICD-10-CM: I10 
ICD-9-CM: 401.9 Uncontrolled type 2 diabetes mellitus without complication, without long-term current use of insulin ([facility]     ICD-10-CM: E11.65 ICD-9-CM: 250.02 Dizziness     ICD-10-CM: X90 ICD-9-CM: 780.4 Heart murmur     ICD-10-CM: R01.1 ICD-9-CM: 939. 2 Vitals BP Pulse Temp Resp Height(growth percentile) Weight(growth percentile) (!) 162/100 (BP 1 Location: Right arm, BP Patient Position: Sitting) (!) 55 97.9 °F (36.6 °C) (Oral) 16 4' 11\" (1.499 m) 132 lb (59.9 kg) SpO2 BMI OB Status Smoking Status 99% 26.66 kg/m2 Menopause Never Smoker Vitals History BMI and BSA Data Body Mass Index Body Surface Area  
 26.66 kg/m 2 1.58 m 2 Preferred Pharmacy Pharmacy Name Phone [facility]/PHARMACY #[id number] Bhatti, [address] [place] [address] [phone] Your Updated Medication List  
  
   
This list is accurate as of: 4/21/17  1:30 PM.  Always use your most recent med list. amLODIPine 10 mg tablet Commonly known as:  [place]havik [place] Take 1 Tab by mouth daily. aspirin delayed-release 81 mg tablet Take  by mouth daily. atenolol 50 mg tablet Commonly known as:  TENORMIN  
TAKE ONE TABLET BY MOUTH ONE TIME DAILY  
  
 calcium-cholecalciferol (D3) tablet Commonly known as:  Calcium 600 + D Take 1 Tab by mouth two (2) times a day. glucose blood VI test strips strip Commonly known as:  blood glucose test  
Check BS BID. Dx code: E11.65  
  
 metFORMIN  mg tablet Commonly known as:  GLUCOPHAGE XR Take 2 Tabs by mouth daily. pravastatin 40 mg tablet Commonly known as:  PRAVACHOL Take 1 Tab by mouth nightly. valsartan-hydroCHLOROthiazide 320-25 mg per tablet Commonly known as:  DIOVAN-HCT Take 1 Tab by mouth daily. Prescriptions Sent to Pharmacy Refills  
 valsartan-hydroCHLOROthiazide (DIOVAN-HCT) 320-25 mg per tablet 1 Sig: Take 1 Tab by mouth daily. Class: Normal  
 Pharmacy: [address] [place] [address], [address] [place] [address]  #: [phone] Route: Oral  
  
Follow-up Instructions Return in about 2 weeks (around 5/5/2017) for routine care with me. To-Do List   
 04/21/2017 Lab:  HEMOGLOBIN A1C WITH EAG   
  
 04/21/2017 Lab:  METABOLIC PANEL, COMPREHENSIVE   
  
 05/05/2017 ECHO:  2D ECHO COMPLETE ADULT (TTE) W OR WO CONTR Patient Instructions High Blood Pressure: Care Instructions Your Care Instructions If your blood pressure is usually above 140/90, you have high blood pressure, or hypertension. That means the top number is 140 or higher or the bottom number is 90 or higher, or both. Despite what a lot of people think, high blood pressure usually doesn't cause headaches or make you feel dizzy or lightheaded. It usually has no symptoms. But it does increase your risk for heart attack, stroke, and kidney or eye damage. The higher your blood pressure, the more your risk increases. Your doctor will give you a goal for your blood pressure. Your goal will be based on your health and your age. An example of a goal is to keep your blood pressure below 140/90. Lifestyle changes, such as eating healthy and being active, are always important to help lower blood pressure. You might also take medicine to reach your blood pressure goal. 
Follow-up care is a key part of your treatment and safety. Be sure to make and go to all appointments, and call your doctor if you are having problems. It's also a good idea to know your test results and keep a list of the medicines you take. How can you care for yourself at home? Medical treatment · If you stop taking your medicine, your blood pressure will go back up. You may take one or more types of medicine to lower your blood pressure. Be safe with medicines. Take your medicine exactly as prescribed. Call your doctor if you think you are having a problem with your medicine. · Talk to your doctor before you start taking aspirin every day. Aspirin can help certain people lower their risk of a heart attack or stroke. But taking aspirin isn't right for everyone, because it can cause serious bleeding. · See your doctor regularly. You may need to see the doctor more often at first or until your blood pressure comes down. · If you are taking blood pressure medicine, talk to your doctor before you take decongestants or anti-inflammatory medicine, such as ibuprofen. Some of these medicines can raise blood pressure. · Learn how to check your blood pressure at home. Lifestyle changes · Stay at a healthy weight.  This is especially important if you put on weight around the waist. Losing even 10 pounds can help you lower your blood pressure. · If your doctor recommends it, get more exercise. Walking is a good choice. Bit by bit, increase the amount you walk every day. Try for at least 30 minutes on most days of the week. You also may want to swim, bike, or do other activities. · Avoid or limit alcohol. Talk to your doctor about whether you can drink any alcohol. · Try to limit how much sodium you eat to less than 2,300 milligrams (mg) a day. Your doctor may ask you to try to eat less than 1,500 mg a day. · Eat plenty of fruits (such as bananas and oranges), vegetables, legumes, whole grains, and low-fat dairy products. · Lower the amount of saturated fat in your diet. Saturated fat is found in animal products such as milk, cheese, and meat. Limiting these foods may help you lose weight and also lower your risk for heart disease. · Do not smoke. Smoking increases your risk for heart attack and stroke. If you need help quitting, talk to your doctor about stop-smoking programs and medicines. These can increase your chances of quitting for good. When should you call for help? Call 911 anytime you think you may need emergency care. This may mean having symptoms that suggest that your blood pressure is causing a serious heart or blood vessel problem. Your blood pressure may be over 180/110. For example, call 911 if: 
· You have symptoms of a heart attack. These may include: ¨ Chest pain or pressure, or a strange feeling in the chest. 
¨ Sweating. ¨ Shortness of breath. ¨ Nausea or vomiting. ¨ Pain, pressure, or a strange feeling in the back, neck, jaw, or upper belly or in one or both shoulders or arms. ¨ Lightheadedness or sudden weakness. ¨ A fast or irregular heartbeat. · You have symptoms of a stroke. These may include: 
¨ Sudden numbness, tingling, weakness, or loss of movement in your face, arm, or leg, especially on only one side of your body. ¨ Sudden vision changes. ¨ Sudden trouble speaking. ¨ Sudden confusion or trouble understanding simple statements. ¨ Sudden problems with walking or balance. ¨ A sudden, severe headache that is different from past headaches. · You have severe back or belly pain. Do not wait until your blood pressure comes down on its own. Get help right away. Call your doctor now or seek immediate care if: 
· Your blood pressure is much higher than normal (such as 180/110 or higher), but you don't have symptoms. · You think high blood pressure is causing symptoms, such as: ¨ Severe headache. ¨ Blurry vision. Watch closely for changes in your health, and be sure to contact your doctor if: 
· Your blood pressure measures 140/90 or higher at least 2 times. That means the top number is 140 or higher or the bottom number is 90 or higher, or both. · You think you may be having side effects from your blood pressure medicine. · Your blood pressure is usually normal, but it goes above normal at least 2 times. Where can you learn more? Go to http://shashank-louie.info/. Enter R673 in the search box to learn more about \"High Blood Pressure: Care Instructions. \" Current as of: August 8, 2016 Content Version: 11.2 © 9100-7233 [employer]. Care instructions adapted under license by [employer] (which disclaims liability or warranty for this information). If you have questions about a medical condition or this instruction, always ask your healthcare professional. [place] [address] any warranty or liability for your use of this information. Please provide this summary of care documentation to your next provider. Your primary care clinician is listed as Sher Hinkle. If you have any questions after today's visit, please call [phone].

## 2017-04-25 ENCOUNTER — TELEPHONE (OUTPATIENT)
Dept: FAMILY MEDICINE CLINIC | Facility: CLINIC | Age: 82
End: 2017-04-25

## 2017-04-25 NOTE — TELEPHONE ENCOUNTER
Please advise her labs show uncontrolled diabetes, A1c at 8.7, patient to stop metformin and to start Janumet XR which has same dose of metformin in it as she has been taking patient to call if has adverse side effects from medication and to monitor blood sugar

## 2017-04-28 NOTE — TELEPHONE ENCOUNTER
Spoke with Hermelindo Youngblood, Verified 2 patient identifiers. Spoke with patient in regards to lab results. Relayed PA notes patient to stop metformin and to start Janumet XR which has same dose of metformin and watch for hypoglycemia. Patient acknowledges understanding and voices no further questions or concerns at this time.

## 2017-05-05 ENCOUNTER — OFFICE VISIT (OUTPATIENT)
Dept: FAMILY MEDICINE CLINIC | Facility: CLINIC | Age: 82
End: 2017-05-05

## 2017-05-05 VITALS
DIASTOLIC BLOOD PRESSURE: 108 MMHG | HEIGHT: 59 IN | BODY MASS INDEX: 26.21 KG/M2 | WEIGHT: 130 LBS | HEART RATE: 65 BPM | TEMPERATURE: 96.9 F | OXYGEN SATURATION: 98 % | RESPIRATION RATE: 18 BRPM | SYSTOLIC BLOOD PRESSURE: 250 MMHG

## 2017-05-05 DIAGNOSIS — I16.0 HYPERTENSIVE URGENCY: Primary | ICD-10-CM

## 2017-05-05 RX ORDER — VALSARTAN 320 MG/1
TABLET ORAL
Refills: 3 | COMMUNITY
Start: 2017-03-22 | End: 2017-06-10 | Stop reason: ALTCHOICE

## 2017-05-05 RX ORDER — AMLODIPINE BESYLATE 10 MG/1
TABLET ORAL
Refills: 3 | COMMUNITY
Start: 2017-03-22 | End: 2017-07-03

## 2017-05-05 NOTE — PROGRESS NOTES
Hussain Herrera is a 80 y.o. female presents today for follow-up. Depression Screening: Completed  Fall Risk Screening: Completed    1. Have you been to the ER, urgent care clinic since your last visit? Hospitalized since your last visit? No    2. Have you seen or consulted any other health care providers outside of the [facility] since your last visit? Include any pap smears or colon screening. No     Health Maintenance reviewed.

## 2017-05-05 NOTE — PROGRESS NOTES
History and Physical    Patient: Irving Guzman MRN: [id number]  SSN: [id number]    YOB: 1932  Age: 80 y.o. Sex: female      Subjective:      Irving Guzman is a 80 y.o. female who presents with daughter today for follow up HTN. In terms of her HTN, she is currently on atenolol, amlodipine and Diovan, she was prescribed Diovan-HCT and told to stop Norvasc, however, she states the Diovan-HCT was too expensive, she states that she took her medication less than 1 hour ago. BP extremely high today, patient not currently c/o dizziness, headaches, blurred vision, confusion etc.      Patient has a h/o DM, she was prescribed Janumet XR, but has not picked it up as of yet. Last Colonoscopy:  past screening age  Last DEXA: 4/2015-osteopenia  Last Mammogram: 4/[id number] 2  Last PAP:  Past screening age      PMH:  Past Medical History:   Diagnosis Date    Advance directive discussed with patient 10/12/2016    Cigarette nicotine dependence in remission 10/12/2016    Diabetes ([facility] [address]     Dizziness 3/16/2017    Essential hypertension 10/3/2016    Hypertension     Microalbuminuria 1/18/2017    Osteopenia 10/3/2016    Osteoporosis     Pure hypercholesterolemia 1/18/2017    Type II diabetes mellitus ([address] 10/3/2016    Vitamin D deficiency 1/18/2017     History reviewed. No pertinent surgical history. FamHx:  Family History   Problem Relation Age of Onset    Hypertension Mother     Cancer Mother      pancreas    Cancer Father      lung       SocialHx:  Social History   Substance Use Topics    Smoking status: Never Smoker    Smokeless tobacco: Never Used    Alcohol use No        Meds:  Prior to Admission medications    Medication Sig Start Date End Date Taking? Authorizing Provider   valsartan (DIOVAN) 320 mg tablet TAKE 1 TABLET BY MOUTH DAILY. 3/22/17  Yes Historical Provider   amLODIPine (NORVASC) 10 mg tablet TAKE 1 TABLET BY MOUTH DAILY.  3/22/17  Yes Historical Provider   glucose blood VI test strips (BLOOD GLUCOSE TEST) strip Check BS BID. Dx code: E11.65 3/22/17  Yes Usman Montoya MD   calcium-cholecalciferol, D3, (CALCIUM 600 + D) tablet Take 1 Tab by mouth two (2) times a day. 3/22/17  Yes Usman Montoya MD   atenolol (TENORMIN) 50 mg tablet TAKE ONE TABLET BY MOUTH ONE TIME DAILY 3/21/17  Yes Usman Montoya MD   aspirin delayed-release 81 mg tablet Take  by mouth daily. Yes Historical Provider   pravastatin (PRAVACHOL) 40 mg tablet Take 1 Tab by mouth nightly. 1/18/17  Yes Usman Montoya MD   SITagliptin-metFORMIN (JANUMET XR)  mg TM24 Take 1 Tab by mouth daily. 4/25/17   ANNA Concepcion   valsartan-hydroCHLOROthiazide (DIOVAN-HCT) 320-25 mg per tablet Take 1 Tab by mouth daily. 4/21/17   ANNA Concepcion        Allergies:  No Known Allergies    Review of Systems:  Items in Bold are positive  Constitutional: negative for fevers, chills and malaise  Eyes: negative for visual disturbance  Ears, Nose, Mouth, Throat, and Face: negative for nasal congestion  Respiratory: negative for cough or SOB  Cardiovascular: negative for chest pain, chest pressure/discomfort  Gastrointestinal: negative for nausea, vomiting, melena, diarrhea, constipation and abdominal pain  Genitourinary:negative for frequency, dysuria or hematuria  Musculoskeletal:negative for myalgias and arthralgias  Neurological: negative for headaches, dizziness and paresthesia    Objective:     Visit Vitals    BP (!) 250/108 (BP 1 Location: Left arm, BP Patient Position: Sitting)    Pulse 65    Temp 96.9 °F (36.1 °C) (Oral)    Resp 18    Ht 4' 11\" (1.499 m)    Wt 130 lb (59 kg)    SpO2 98%    BMI 26.26 kg/m2       Physical Exam:  GENERAL: alert, cooperative, no distress, appears stated age  HEENT: EYE: conjunctivae/corneas clear. PERRL, EOM's intact. NEUROLOGIC: AOx3. Gait normal. Speech normal.         Assessment and Plan:       ICD-10-CM ICD-9-CM    1. Hypertensive urgency I16.0 401.9    2. Uncontrolled type 2 diabetes mellitus without complication, without long-term current use of insulin (HCC) E11.65 250.02          Medical Decision Making:  Hypertensive Urgency- daughter to take patient to [facility] now- they decline EMS to be called, patient currently not symptomatic in terms of dizziness, headaches, confusion etc advised to follow up next week so we can address BP, patient to  diovan-hct over the weekend and take that instead of diovan     DM- patient advised to  Janumet XR      Follow-up Disposition:  Return in about 1 week (around 5/12/2017) for routine care with me, for bp check. Patient acknowledges understanding of instructions and acknowledges understanding to call back if current symptoms worsen or new symptoms arise. Patient acknowledges and agrees with plan.         Signed By: ANNA Bautista     May 5, 2017

## 2017-05-05 NOTE — MR AVS SNAPSHOT
Visit Information Date & Time Provider Department Dept. Phone Encounter #  
 5/5/2017  8:00 AM Mago Hayes [phone] [id number] Follow-up Instructions Return in about 1 week (around 5/12/2017) for routine care with me, for bp check. Upcoming Health Maintenance Date Due  
 GLAUCOMA SCREENING Q2Y 12/14/1997 EYE EXAM RETINAL OR DILATED Q1 5/31/2017* INFLUENZA AGE 9 TO ADULT 8/1/2017 FOOT EXAM Q1 10/12/2017 MICROALBUMIN Q1 10/12/2017 MEDICARE YEARLY EXAM 10/13/2017 HEMOGLOBIN A1C Q6M 10/21/2017 LIPID PANEL Q1 3/1/2018 DTaP/Tdap/Td series (2 - Td) 12/10/2020 *Topic was postponed. The date shown is not the original due date. Allergies as of 5/5/2017  Review Complete On: 5/5/2017 By: Chuy Reyes No Known Allergies Current Immunizations  Never Reviewed Name Date Influenza Vaccine 10/15/2015, 12/31/2013, 1/2/2013, 11/2/2009 Influenza Vaccine (Quad) PF 10/3/2016 Pneumococcal Vaccine (Unspecified Type) 11/2/2009 Tdap 12/10/2010 Not reviewed this visit Vitals BP Pulse Temp Resp Height(growth percentile) Weight(growth percentile) (!) 220/100 (BP 1 Location: Right arm, BP Patient Position: Sitting) 65 96.9 °F (36.1 °C) (Oral) 18 4' 11\" (1.499 m) 130 lb (59 kg) SpO2 BMI OB Status Smoking Status 98% 26.26 kg/m2 Menopause Never Smoker BMI and BSA Data Body Mass Index Body Surface Area  
 26.26 kg/m 2 1.57 m 2 Preferred Pharmacy Pharmacy Name Phone [id number] [phone] Your Updated Medication List  
  
   
This list is accurate as of: 5/5/17  8:26 AM.  Always use your most recent med list. amLODIPine 10 mg tablet Commonly known as:  Elaina Blake TAKE 1 TABLET BY MOUTH DAILY. aspirin delayed-release 81 mg tablet Take  by mouth daily. atenolol 50 mg tablet Commonly known as:  TENORMIN  
TAKE ONE TABLET BY MOUTH ONE TIME DAILY  
  
 calcium-cholecalciferol (D3) tablet Commonly known as:  Calcium 600 + D Take 1 Tab by mouth two (2) times a day. glucose blood VI test strips strip Commonly known as:  blood glucose test  
Check BS BID. Dx code: E11.65  
  
 pravastatin 40 mg tablet Commonly known as:  PRAVACHOL Take 1 Tab by mouth nightly. SITagliptin-metFORMIN  mg Tm24 Commonly known as:  JANUMET XR Take 1 Tab by mouth daily. valsartan 320 mg tablet Commonly known as:  DIOVAN  
TAKE 1 TABLET BY MOUTH DAILY. valsartan-hydroCHLOROthiazide 320-25 mg per tablet Commonly known as:  DIOVAN-HCT Take 1 Tab by mouth daily. Follow-up Instructions Return in about 1 week (around 5/12/2017) for routine care with me, for bp check. Patient Instructions DASH Diet: Care Instructions Your Care Instructions The DASH diet is an eating plan that can help lower your blood pressure. DASH stands for Dietary Approaches to Stop Hypertension. Hypertension is high blood pressure. The DASH diet focuses on eating foods that are high in calcium, potassium, and magnesium. These nutrients can lower blood pressure. The foods that are highest in these nutrients are fruits, vegetables, low-fat dairy products, nuts, seeds, and legumes. But taking calcium, potassium, and magnesium supplements instead of eating foods that are high in those nutrients does not have the same effect. The DASH diet also includes whole grains, fish, and poultry. The DASH diet is one of several lifestyle changes your doctor may recommend to lower your high blood pressure. Your doctor may also want you to decrease the amount of sodium in your diet. Lowering sodium while following the DASH diet can lower blood pressure even further than just the DASH diet alone. Follow-up care is a key part of your treatment and safety. Be sure to make and go to all appointments, and call your doctor if you are having problems. It's also a good idea to know your test results and keep a list of the medicines you take. How can you care for yourself at home? Following the DASH diet · Eat 4 to 5 servings of fruit each day. A serving is 1 medium-sized piece of fruit, ½ cup chopped or canned fruit, 1/4 cup dried fruit, or 4 ounces (½ cup) of fruit juice. Choose fruit more often than fruit juice. · Eat 4 to 5 servings of vegetables each day. A serving is 1 cup of lettuce or raw leafy vegetables, ½ cup of chopped or cooked vegetables, or 4 ounces (½ cup) of vegetable juice. Choose vegetables more often than vegetable juice. · Get 2 to 3 servings of low-fat and fat-free dairy each day. A serving is 8 ounces of milk, 1 cup of yogurt, or 1 ½ ounces of cheese. · Eat 6 to 8 servings of grains each day. A serving is 1 slice of bread, 1 ounce of dry cereal, or ½ cup of cooked rice, pasta, or cooked cereal. Try to choose whole-grain products as much as possible. · Limit lean meat, poultry, and fish to 2 servings each day. A serving is 3 ounces, about the size of a deck of cards. · Eat 4 to 5 servings of nuts, seeds, and legumes (cooked dried beans, lentils, and split peas) each week. A serving is 1/3 cup of nuts, 2 tablespoons of seeds, or ½ cup of cooked beans or peas. · Limit fats and oils to 2 to 3 servings each day. A serving is 1 teaspoon of vegetable oil or 2 tablespoons of salad dressing. · Limit sweets and added sugars to 5 servings or less a week. A serving is 1 tablespoon jelly or jam, ½ cup sorbet, or 1 cup of lemonade. · Eat less than 2,300 milligrams (mg) of sodium a day. If you limit your sodium to 1,500 mg a day, you can lower your blood pressure even more. Tips for success · Start small.  Do not try to make dramatic changes to your diet all at once. You might feel that you are missing out on your favorite foods and then be more likely to not follow the plan. Make small changes, and stick with them. Once those changes become habit, add a few more changes. · Try some of the following: ¨ Make it a goal to eat a fruit or vegetable at every meal and at snacks. This will make it easy to get the recommended amount of fruits and vegetables each day. ¨ Try yogurt topped with fruit and nuts for a snack or healthy dessert. ¨ Add lettuce, tomato, cucumber, and onion to sandwiches. ¨ Combine a ready-made pizza crust with low-fat mozzarella cheese and lots of vegetable toppings. Try using tomatoes, squash, spinach, broccoli, carrots, cauliflower, and onions. ¨ Have a variety of cut-up vegetables with a low-fat dip as an appetizer instead of chips and dip. ¨ Sprinkle sunflower seeds or chopped almonds over salads. Or try adding chopped walnuts or almonds to cooked vegetables. ¨ Try some vegetarian meals using beans and peas. Add garbanzo or kidney beans to salads. Make burritos and tacos with mashed mosher beans or black beans. Where can you learn more? Go to http://shashank-louie.info/. Enter X991 in the search box to learn more about \"DASH Diet: Care Instructions. \" Current as of: March 23, 2016 Content Version: 11.2 © 1124-0884 [employer]. Care instructions adapted under license by [employer] (which disclaims liability or warranty for this information). If you have questions about a medical condition or this instruction, always ask your healthcare professional. [place] [address] any warranty or liability for your use of this information. Please provide this summary of care documentation to your next provider. Your primary care clinician is listed as Evon Edge. If you have any questions after today's visit, please call [phone].

## 2017-05-05 NOTE — PATIENT INSTRUCTIONS

## 2017-05-16 ENCOUNTER — TELEPHONE (OUTPATIENT)
Dept: FAMILY MEDICINE CLINIC | Facility: CLINIC | Age: 82
End: 2017-05-16

## 2017-05-16 DIAGNOSIS — I35.0 NONRHEUMATIC AORTIC VALVE STENOSIS: Primary | ICD-10-CM

## 2017-05-18 DIAGNOSIS — R01.1 HEART MURMUR: ICD-10-CM

## 2017-05-23 NOTE — TELEPHONE ENCOUNTER
Verified 2 patient identifiers. Spoke to [facility] regarding ECHO results provided her with referral information to call. Patient verbalized understanding. Patient has no further questions or concerns at this time.

## 2017-06-09 DIAGNOSIS — I10 ESSENTIAL HYPERTENSION: ICD-10-CM

## 2017-06-10 ENCOUNTER — TELEPHONE (OUTPATIENT)
Dept: FAMILY MEDICINE CLINIC | Facility: CLINIC | Age: 82
End: 2017-06-10

## 2017-06-10 RX ORDER — HYDROCHLOROTHIAZIDE 25 MG/1
25 TABLET ORAL DAILY
Qty: 14 TAB | Refills: 0 | Status: SHIPPED | OUTPATIENT
Start: 2017-06-10 | End: 2017-07-12

## 2017-06-10 RX ORDER — VALSARTAN AND HYDROCHLOROTHIAZIDE 320; 25 MG/1; MG/1
1 TABLET, FILM COATED ORAL DAILY
Qty: 14 TAB | Refills: 0 | Status: SHIPPED | OUTPATIENT
Start: 2017-06-10 | End: 2017-06-10 | Stop reason: ALTCHOICE

## 2017-06-10 NOTE — TELEPHONE ENCOUNTER
Received call from daughter, Epifanio Silvestre, whom we have documented permission to speak to, who was with patient, daughter states that when patient went to ED last month the ED doctor gave her HCTZ, she already has enough Diovan, never started Diovan-HCT. Patient is requesting refilling of HCTZ only.   Looked at ED record from 5/5/17 where HCTZ 25 mg was added, advised daughter would refill this but patient needs to call on Monday to schedule follow up as she was suppose to have already been seen, daughter acknowledged understanding    [facility], they confirmed this and receipt of HCTZ 25

## 2017-06-10 NOTE — TELEPHONE ENCOUNTER
Called and left messages for returned call for both patient and patients emergency contact regarding refill request    Patient was advised to follow up after last visit within 1 week for BP and med recheck, she failed to do this, current refill request is for Diovan-HCT which at last visit patient was advised to  and start taking and return in 1 week, however, prior to this patient was only taking Diovan    Due to inability to get in contact with patient or emergency contact, will give short refill of Diovan-HCT, the requested medication and medication patient was last instructed to take, with instructions to pharmacist to have patient follow up for future refills

## 2017-07-03 ENCOUNTER — OFFICE VISIT (OUTPATIENT)
Dept: FAMILY MEDICINE CLINIC | Facility: CLINIC | Age: 82
End: 2017-07-03

## 2017-07-03 VITALS
TEMPERATURE: 97.6 F | BODY MASS INDEX: 25.68 KG/M2 | WEIGHT: 127.4 LBS | RESPIRATION RATE: 14 BRPM | HEART RATE: 52 BPM | DIASTOLIC BLOOD PRESSURE: 72 MMHG | HEIGHT: 59 IN | OXYGEN SATURATION: 97 % | SYSTOLIC BLOOD PRESSURE: 126 MMHG

## 2017-07-03 DIAGNOSIS — E78.00 PURE HYPERCHOLESTEROLEMIA: ICD-10-CM

## 2017-07-03 DIAGNOSIS — M85.80 OSTEOPENIA, UNSPECIFIED LOCATION: ICD-10-CM

## 2017-07-03 DIAGNOSIS — I10 ESSENTIAL HYPERTENSION: Primary | ICD-10-CM

## 2017-07-03 DIAGNOSIS — Z13.31 SCREENING FOR DEPRESSION: ICD-10-CM

## 2017-07-03 LAB — HBA1C MFR BLD HPLC: 7 %

## 2017-07-03 RX ORDER — ATENOLOL 50 MG/1
TABLET ORAL
Refills: 3 | COMMUNITY
Start: 2017-05-30 | End: 2017-07-12

## 2017-07-03 RX ORDER — METFORMIN HYDROCHLORIDE 500 MG/1
500 TABLET ORAL 2 TIMES DAILY WITH MEALS
Qty: 180 TAB | Refills: 3 | Status: SHIPPED | OUTPATIENT
Start: 2017-07-03 | End: 2017-11-15 | Stop reason: DRUGHIGH

## 2017-07-03 RX ORDER — METFORMIN HYDROCHLORIDE 500 MG/1
TABLET ORAL 2 TIMES DAILY WITH MEALS
COMMUNITY
End: 2017-07-03 | Stop reason: SDUPTHER

## 2017-07-03 RX ORDER — ATORVASTATIN CALCIUM 40 MG/1
40 TABLET, FILM COATED ORAL
COMMUNITY
Start: 2017-06-14 | End: 2017-07-12

## 2017-07-03 RX ORDER — MULTIVITAMIN
TABLET ORAL
COMMUNITY
Start: 2017-03-22 | End: 2017-07-12

## 2017-07-03 RX ORDER — VALSARTAN 80 MG/1
80 TABLET ORAL
COMMUNITY
Start: 2017-06-14 | End: 2017-07-12

## 2017-07-03 NOTE — PATIENT INSTRUCTIONS

## 2017-07-03 NOTE — PROGRESS NOTES
Internal Medicine Progress Note    Today's Date:  7/3/2017   Patient:  Otilia Roland  Patient :  1932    Subjective:     Chief Complaint   Patient presents with    Hypertension    Diabetes      Hypertension   This is a chronic problem. BP is not at goal in the office but at goal at home and at cardiologist's office. Pt takes diovan, atenolol and HCTZ. Pt reports compliance with these medications. Pt is unsure of medication list.       Osteopenia  This is a chronic problem. This is not at goal. Pt is off fosamax. Pt was on fosamax for two years. Last dexa scan was in . Pt is not on calcium/Vitamin D.       Diabetes mellitus  This is a chronic problem. This is controlled. Pt takes metformin. Pt is on aspirin and statin.  +microalbuminuria.      Past Medical History:   Diagnosis Date    Advance directive discussed with patient 10/12/2016    Cigarette nicotine dependence in remission 10/12/2016    Diabetes ([facility] [address]     Dizziness 3/16/2017    Essential hypertension 10/3/2016    Hypertension     Microalbuminuria 2017    Osteopenia 10/3/2016    Osteoporosis     Pure hypercholesterolemia 2017    Type II diabetes mellitus ([facility] [address] 10/3/2016    Vitamin D deficiency 2017     History reviewed. No pertinent surgical history. reports that she has never smoked. She has never used smokeless tobacco. She reports that she does not drink alcohol or use illicit drugs. Family History   Problem Relation Age of Onset    Hypertension Mother     Cancer Mother      pancreas    Cancer Father      lung     No Known Allergies  Review of Systems   Positives in bold  CV:      chest pain, palpitations  PULM:  SOB, wheezing, cough, sputum production    Current Outpatient Meds and Allergies     Current Outpatient Prescriptions on File Prior to Visit   Medication Sig Dispense Refill    hydroCHLOROthiazide (HYDRODIURIL) 25 mg tablet Take 1 Tab by mouth daily.  14 Tab 0    glucose blood VI test strips (BLOOD GLUCOSE TEST) strip Check BS BID. Dx code: E11.65 200 Strip 3    calcium-cholecalciferol, D3, (CALCIUM 600 + D) tablet Take 1 Tab by mouth two (2) times a day. 180 Tab 3    aspirin delayed-release 81 mg tablet Take  by mouth daily. No current facility-administered medications on file prior to visit. No Known Allergies  Objective:     VS:    Visit Vitals    /72  Comment: home BP this morning    Pulse (!) 52    Temp 97.6 °F (36.4 °C) (Oral)    Resp 14    Ht 4' 11\" (1.499 m)    Wt 127 lb 6.4 oz (57.8 kg)    SpO2 97%    BMI 25.73 kg/m2     General:   Well-nourished, well-groomed, pleasant, alert, in no acute distress  Ears:  External ears WNL, TMs WNL  Eyes:  EOMI, PERRL  Nose:  External nares WNL  Psych:  No pressured speech, no abnormal thought content    Office Visit on 07/03/2017   Component Date Value Ref Range Status    Hemoglobin A1c (POC) 07/03/2017 7.0  % Final   Hospital Outpatient Visit on 04/21/2017   Component Date Value Ref Range Status    Sodium 04/21/2017 140  136 - 145 mmol/L Final    Potassium 04/21/2017 3.8  3.5 - 5.5 mmol/L Final    Chloride 04/21/2017 104  100 - 108 mmol/L Final    CO2 04/21/2017 27  21 - 32 mmol/L Final    Anion gap 04/21/2017 9  3.0 - 18 mmol/L Final    Glucose 04/21/2017 141* 74 - 99 mg/dL Final    BUN 04/21/2017 10  7.0 - 18 MG/DL Final    Creatinine 04/21/2017 0.65  0.6 - 1.3 MG/DL Final    BUN/Creatinine ratio 04/21/2017 15  12 - 20   Final    GFR est AA 04/21/2017 >60  >60 ml/min/1.73m2 Final    GFR est non-AA 04/21/2017 >60  >60 ml/min/1.73m2 Final    Comment: (NOTE)  Estimated GFR is calculated using the Modification of Diet in Renal   Disease (MDRD) Study equation, reported for both  Americans   (GFRAA) and non- Americans (GFRNA), and normalized to 1.73m2   body surface area. The physician must decide which value applies to   the patient.  The MDRD study equation should only be used in   individuals age 25 or older. It has not been validated for the   following: pregnant women, patients with serious comorbid conditions,   or on certain medications, or persons with extremes of body size,   muscle mass, or nutritional status.  Calcium 04/21/2017 9.0  8.5 - 10.1 MG/DL Final    Bilirubin, total 04/21/2017 0.2  0.2 - 1.0 MG/DL Final    ALT (SGPT) 04/21/2017 23  13 - 56 U/L Final    AST (SGOT) 04/21/2017 13* 15 - 37 U/L Final    Alk. phosphatase 04/21/2017 66  45 - 117 U/L Final    Protein, total 04/21/2017 6.8  6.4 - 8.2 g/dL Final    Albumin 04/21/2017 3.8  3.4 - 5.0 g/dL Final    Globulin 04/21/2017 3.0  2.0 - 4.0 g/dL Final    A-G Ratio 04/21/2017 1.3  0.8 - 1.7   Final    Hemoglobin A1c 04/21/2017 8.7* 4.2 - 5.6 % Final    Comment: (NOTE)  HbA1C Interpretive Ranges  <5.7              Normal  5.7 - 6.4         Consider Prediabetes  >6.5              Consider Diabetes      Est. average glucose 04/21/2017 203  mg/dL Final    Comment: (NOTE)  The eAG should be interpreted with patient characteristics in mind   since ethnicity, interindividual differences, red cell lifespan,   variation in rates of glycation, etc. may affect the validity of the   calculation. Hospital Outpatient Visit on 04/04/2017   Component Date Value Ref Range Status    Creatinine, POC 04/04/2017 0.6  0.6 - 1.3 MG/DL Final    GFRAA, POC 04/04/2017 >60  >60 ml/min/1.73m2 Final    GFRNA, POC 04/04/2017 >60  >60 ml/min/1.73m2 Final    Comment: Estimated GFR is calculated using the IDMS-traceable Modification of Diet in Renal Disease (MDRD) Study equation, reported for both  Americans (GFRAA) and non- Americans (GFRNA), and normalized to 1.73m2 body surface area. The physician must decide which value applies to the patient. The MDRD study equation should only be used in individuals age 25 or older.  It has not been validated for the following: pregnant women, patients with serious comorbid conditions, or on certain medications, or persons with extremes of body size, muscle mass, or nutritional status. Assessment/Plan & Orders:         ICD-10-CM ICD-9-CM    1. Essential hypertension I10 401.9    2. Uncontrolled type 2 diabetes mellitus without complication, without long-term current use of insulin (HCC) E11.65 250.02 AMB POC HEMOGLOBIN A1C      metFORMIN (GLUCOPHAGE) 500 mg tablet   3. Pure hypercholesterolemia E78.00 272.0 LIPID PANEL   4. Osteopenia, unspecified location M85.80 733.90    5. Screening for depression [facility]13.89 [facility]     Healthy lifestyle has been encouraged including avoidance of tobacco, limiting or avoiding alcohol intake, heart healthy diet which is low in cholesterol and saturated fat and contains fresh fruits, vegetables and whole grains and fiber, regular exercise with goals of 20-30 minutes 3-5 days weekly and maintaining an optimal BMI. Pt to get eye exam. Form given   Pt to restart calcium/Vit D. Has medication at home  Pt to return in 1 wk for bp check and will bring medication bottles    Follow-up Disposition:  Return in about 1 week (around 7/10/2017) for Go over lab/imaging results, Follow up hypertension, Follow up hyperlipidemia, Follow up diabetes me. *Patient verbalized understanding and agreement with the plan. Patient was given an after-visit summary. Joel John.  [address], MD - Internal Medicine  7/3/2017, 11:20 AM  [facility]  [address] [place], [address]  Phone [phone]  Fax [phone]

## 2017-07-03 NOTE — PROGRESS NOTES
Gretchen Caputo is a 80 y.o. female presents today for follow up for hypertension. This patient is accompanied in the office by her daughter. Pt is in Room # 1        1. Have you been to the ER, urgent care clinic since your last visit? Hospitalized since your last visit? No.    2. Have you seen or consulted any other health care providers outside of the [address] [place] [address] since your last visit? Include any pap smears or colon screening. Yes, [facility], Dr. Jolanta Olmstead 6/14/17.  reviewed: Patient has a Camila Pedroza will make appointment.

## 2017-07-03 NOTE — MR AVS SNAPSHOT
Visit Information Date & Time Provider Department Dept. Phone Encounter #  
 7/3/2017 10:30 AM Faiasl Lyle MD [facility] [phone] [id number] Follow-up Instructions Return in about 1 week (around 7/10/2017) for Go over lab/imaging results, Follow up hypertension, Follow up hyperlipidemia, Follow up diabetes me. Upcoming Health Maintenance Date Due  
 EYE EXAM RETINAL OR DILATED Q1 [birth year]14[birth year]1942 GLAUCOMA SCREENING Q2Y 12/14/1997 INFLUENZA AGE 9 TO ADULT 8/1/2017 FOOT EXAM Q1 10/12/2017 MICROALBUMIN Q1 10/12/2017 MEDICARE YEARLY EXAM 10/13/2017 HEMOGLOBIN A1C Q6M 10/21/2017 LIPID PANEL Q1 3/1/2018 DTaP/Tdap/Td series (2 - Td) 12/10/2020 Allergies as of 7/3/2017  Review Complete On: 7/3/2017 By: Faisal Lyle MD  
 No Known Allergies Current Immunizations  Never Reviewed Name Date Influenza Vaccine 10/15/2015, 12/31/2013, 1/2/2013, 11/2/2009 Influenza Vaccine (Quad) PF 10/3/2016 Pneumococcal Vaccine (Unspecified Type) 11/2/2009 Tdap 12/10/2010 Not reviewed this visit You Were Diagnosed With   
  
 Codes Comments Essential hypertension    -  Primary ICD-10-CM: I10 
ICD-9-CM: 401.9 Uncontrolled type 2 diabetes mellitus without complication, without long-term current use of insulin ([facility]     ICD-10-CM: E11.65 ICD-9-CM: 250.02   
 Pure hypercholesterolemia     ICD-10-CM: E78.00 ICD-9-CM: 272.0 Osteopenia, unspecified location     ICD-10-CM: M85.80 ICD-9-CM: 733.90 Vitals BP Pulse Temp Resp Height(growth percentile) Weight(growth percentile) 126/72 (!) 52 97.6 °F (36.4 °C) (Oral) 14 4' 11\" (1.499 m) 127 lb 6.4 oz (57.8 kg) SpO2 BMI OB Status Smoking Status 97% 25.73 kg/m2 Menopause Never Smoker Vitals History BMI and BSA Data Body Mass Index Body Surface Area 25.73 kg/m 2 1.55 m 2 Preferred Pharmacy Pharmacy Name Phone [facility]/PHARMACY #[id number] [facility] [address] [phone] Your Updated Medication List  
  
   
This list is accurate as of: 7/3/17 12:15 PM.  Always use your most recent med list.  
  
  
  
  
 aspirin delayed-release 81 mg tablet Take  by mouth daily. atenolol 50 mg tablet Commonly known as:  TENORMIN  
TAKE ONE TABLET BY MOUTH ONE TIME DAILY  
  
 atorvastatin 40 mg tablet Commonly known as:  LIPITOR 40 mg.  
  
 * calcium-cholecalciferol (D3) tablet Commonly known as:  Calcium 600 + D Take 1 Tab by mouth two (2) times a day. * calcium-cholecalciferol (D3) tablet Commonly known as:  CALTRATE 600+D Take  by Mouth. glucose blood VI test strips strip Commonly known as:  blood glucose test  
Check BS BID. Dx code: E11.65  
  
 hydroCHLOROthiazide 25 mg tablet Commonly known as:  HYDRODIURIL Take 1 Tab by mouth daily. metFORMIN 500 mg tablet Commonly known as:  GLUCOPHAGE Take 1 Tab by mouth two (2) times daily (with meals). valsartan 80 mg tablet Commonly known as:  DIOVAN  
80 mg.  
  
 * Notice: This list has 2 medication(s) that are the same as other medications prescribed for you. Read the directions carefully, and ask your doctor or other care provider to review them with you. Prescriptions Sent to Pharmacy Refills  
 metFORMIN (GLUCOPHAGE) 500 mg tablet 3 Sig: Take 1 Tab by mouth two (2) times daily (with meals). Class: Normal  
 Pharmacy: [address] [place] [address] Ph #: [phone] Route: Oral  
  
We Performed the Following AMB POC HEMOGLOBIN A1C [65687 CPT(R)] Follow-up Instructions Return in about 1 week (around 7/10/2017) for Go over lab/imaging results, Follow up hypertension, Follow up hyperlipidemia, Follow up diabetes me. To-Do List   
 07/03/2017   Lab:  LIPID PANEL   
  
  
 Please provide this summary of care documentation to your next provider. Your primary care clinician is listed as Paige Bailey. If you have any questions after today's visit, please call [phone].

## 2017-07-12 ENCOUNTER — OFFICE VISIT (OUTPATIENT)
Dept: FAMILY MEDICINE CLINIC | Facility: CLINIC | Age: 82
End: 2017-07-12

## 2017-07-12 VITALS
SYSTOLIC BLOOD PRESSURE: 148 MMHG | TEMPERATURE: 96.7 F | HEIGHT: 59 IN | DIASTOLIC BLOOD PRESSURE: 82 MMHG | WEIGHT: 127.2 LBS | HEART RATE: 76 BPM | BODY MASS INDEX: 25.64 KG/M2 | OXYGEN SATURATION: 96 % | RESPIRATION RATE: 14 BRPM

## 2017-07-12 DIAGNOSIS — M85.80 OSTEOPENIA, UNSPECIFIED LOCATION: ICD-10-CM

## 2017-07-12 DIAGNOSIS — R80.9 CONTROLLED TYPE 2 DIABETES MELLITUS WITH MICROALBUMINURIA, WITHOUT LONG-TERM CURRENT USE OF INSULIN (HCC): ICD-10-CM

## 2017-07-12 DIAGNOSIS — E11.29 CONTROLLED TYPE 2 DIABETES MELLITUS WITH MICROALBUMINURIA, WITHOUT LONG-TERM CURRENT USE OF INSULIN (HCC): ICD-10-CM

## 2017-07-12 DIAGNOSIS — E78.00 PURE HYPERCHOLESTEROLEMIA: ICD-10-CM

## 2017-07-12 DIAGNOSIS — I10 ESSENTIAL HYPERTENSION: Primary | ICD-10-CM

## 2017-07-12 RX ORDER — PRAVASTATIN SODIUM 40 MG/1
40 TABLET ORAL
COMMUNITY
End: 2017-11-15

## 2017-07-12 RX ORDER — METOPROLOL SUCCINATE 25 MG/1
25 TABLET, EXTENDED RELEASE ORAL DAILY
Qty: 90 TAB | Refills: 3 | Status: SHIPPED | OUTPATIENT
Start: 2017-07-12 | End: 2018-10-20 | Stop reason: SDUPTHER

## 2017-07-12 RX ORDER — VALSARTAN AND HYDROCHLOROTHIAZIDE 320; 25 MG/1; MG/1
1 TABLET, FILM COATED ORAL DAILY
COMMUNITY
End: 2017-11-15

## 2017-07-12 NOTE — PROGRESS NOTES
Internal Medicine Progress Note    Today's Date:  2017   Patient:  Gretchen Caputo  Patient :  [id number]    Subjective:     Chief Complaint   Patient presents with    Hypertension    Diabetes      Hypertension   This is a chronic problem. BP is not at goal in the office but at goal at home and at cardiologist's office. Pt takes diovan, atenolol and HCTZ. Pt reports compliance with these medications. Pt brought medication bottles to the visit today. Osteopenia  This is a chronic problem. This is stable. Pt is off fosamax. Pt was on fosamax for two years. Last dexa scan was in . Pt is on calcium/Vitamin D.       Diabetes mellitus  This is a chronic problem. This is controlled. Pt takes metformin. Pt is on aspirin and statin.  +microalbuminuria.      Past Medical History:   Diagnosis Date    Advance directive discussed with patient 10/12/2016    Cigarette nicotine dependence in remission 10/12/2016    Diabetes ([facility]     Dizziness 3/16/2017    Essential hypertension 10/3/2016    Hypertension     Microalbuminuria 2017    Osteopenia 10/3/2016    Osteoporosis     Pure hypercholesterolemia 2017    Type II diabetes mellitus ([facility] 10/3/2016    Vitamin D deficiency 2017     History reviewed. No pertinent surgical history. reports that she has never smoked. She has never used smokeless tobacco. She reports that she does not drink alcohol or use illicit drugs. Family History   Problem Relation Age of Onset    Hypertension Mother     Cancer Mother      pancreas    Cancer Father      lung     No Known Allergies  Review of Systems   Positives in bold  CV:      chest pain, palpitations  PULM:  SOB, wheezing, cough, sputum production    Current Outpatient Meds and Allergies     Current Outpatient Prescriptions on File Prior to Visit   Medication Sig Dispense Refill    metFORMIN (GLUCOPHAGE) 500 mg tablet Take 1 Tab by mouth two (2) times daily (with meals).  180 Tab 3    glucose blood VI test strips (BLOOD GLUCOSE TEST) strip Check BS BID. Dx code: E11.65 200 Strip 3    calcium-cholecalciferol, D3, (CALCIUM 600 + D) tablet Take 1 Tab by mouth two (2) times a day. 180 Tab 3    aspirin delayed-release 81 mg tablet Take  by mouth daily. No current facility-administered medications on file prior to visit. No Known Allergies  Objective:     VS:    Visit Vitals    /82 (BP 1 Location: Right arm, BP Patient Position: Sitting)  Comment: manual    Pulse 76    Temp 96.7 °F (35.9 °C) (Oral)    Resp 14    Ht 4' 11\" (1.499 m)    Wt 127 lb 3.2 oz (57.7 kg)    SpO2 96%    BMI 25.69 kg/m2     General:   Well-nourished, well-groomed, pleasant, alert, in no acute distress  Ears:  External ears WNL, TMs WNL  Eyes:  EOMI, PERRL  Nose:  External nares WNL  Psych:  No pressured speech, no abnormal thought content    Office Visit on 07/03/2017   Component Date Value Ref Range Status    Hemoglobin A1c (POC) 07/03/2017 7.0  % Final   Hospital Outpatient Visit on 04/21/2017   Component Date Value Ref Range Status    Sodium 04/21/2017 140  136 - 145 mmol/L Final    Potassium 04/21/2017 3.8  3.5 - 5.5 mmol/L Final    Chloride 04/21/2017 104  100 - 108 mmol/L Final    CO2 04/21/2017 27  21 - 32 mmol/L Final    Anion gap 04/21/2017 9  3.0 - 18 mmol/L Final    Glucose 04/21/2017 141* 74 - 99 mg/dL Final    BUN 04/21/2017 10  7.0 - 18 MG/DL Final    Creatinine 04/21/2017 0.65  0.6 - 1.3 MG/DL Final    BUN/Creatinine ratio 04/21/2017 15  12 - 20   Final    GFR est AA 04/21/2017 >60  >60 ml/min/1.73m2 Final    GFR est non-AA 04/21/2017 >60  >60 ml/min/1.73m2 Final    Comment: (NOTE)  Estimated GFR is calculated using the Modification of Diet in Renal   Disease (MDRD) Study equation, reported for both  Americans   (GFRAA) and non- Americans (GFRNA), and normalized to 1.73m2   body surface area. The physician must decide which value applies to   the patient.  The MDRD study equation should only be used in   individuals age 25 or older. It has not been validated for the   following: pregnant women, patients with serious comorbid conditions,   or on certain medications, or persons with extremes of body size,   muscle mass, or nutritional status.  Calcium 04/21/2017 9.0  8.5 - 10.1 MG/DL Final    Bilirubin, total 04/21/2017 0.2  0.2 - 1.0 MG/DL Final    ALT (SGPT) 04/21/2017 23  13 - 56 U/L Final    AST (SGOT) 04/21/2017 13* 15 - 37 U/L Final    Alk. phosphatase 04/21/2017 66  45 - 117 U/L Final    Protein, total 04/21/2017 6.8  6.4 - 8.2 g/dL Final    Albumin 04/21/2017 3.8  3.4 - 5.0 g/dL Final    Globulin 04/21/2017 3.0  2.0 - 4.0 g/dL Final    A-G Ratio 04/21/2017 1.3  0.8 - 1.7   Final    Hemoglobin A1c 04/21/2017 8.7* 4.2 - 5.6 % Final    Comment: (NOTE)  HbA1C Interpretive Ranges  <5.7              Normal  5.7 - 6.4         Consider Prediabetes  >6.5              Consider Diabetes      Est. average glucose 04/21/2017 203  mg/dL Final    Comment: (NOTE)  The eAG should be interpreted with patient characteristics in mind   since ethnicity, interindividual differences, red cell lifespan,   variation in rates of glycation, etc. may affect the validity of the   calculation. Assessment/Plan & Orders:         ICD-10-CM ICD-9-CM    1. Essential hypertension I10 401.9 metoprolol succinate (TOPROL-XL) 25 mg XL tablet   2. Controlled type 2 diabetes mellitus with microalbuminuria, without long-term current use of insulin (HCC) E11.29 250.40     R80.9 791.0    3. Pure hypercholesterolemia E78.00 272.0    4.  Osteopenia, unspecified location M85.80 733.90      Healthy lifestyle has been encouraged including avoidance of tobacco, limiting or avoiding alcohol intake, heart healthy diet which is low in cholesterol and saturated fat and contains fresh fruits, vegetables and whole grains and fiber, regular exercise with goals of 20-30 minutes 3-5 days weekly and maintaining an optimal BMI. Pt to get eye exam. Form given   Stop atenolol  Check BP at home and bring BP log    Follow-up Disposition:  Return in about 1 week (around 7/19/2017) for Go over lab/imaging results, Follow up hypertension, Follow up hyperlipidemia, Follow up diabetes me. *Patient verbalized understanding and agreement with the plan. Patient was given an after-visit summary. Marlene Yao.  [facility], MD - Internal Medicine  7/12/2017, 11:20 AM  [address]  [address] TERI Abdi, [address]  Phone [phone]  Fax [phone]

## 2017-07-12 NOTE — MR AVS SNAPSHOT
Visit Information Date & Time Provider Department Dept. Phone Encounter #  
 7/12/2017 10:30 AM Hope Aparicio MD [facility] [phone] [id number] Follow-up Instructions Return in about 1 week (around 7/19/2017) for Go over lab/imaging results, Follow up hypertension, Follow up hyperlipidemia, Follow up diabetes me. Upcoming Health Maintenance Date Due  
 EYE EXAM RETINAL OR DILATED Q1 [birth year]14[birth year]1942 GLAUCOMA SCREENING Q2Y 12/14/1997 INFLUENZA AGE 9 TO ADULT 8/1/2017 FOOT EXAM Q1 10/12/2017 MICROALBUMIN Q1 10/12/2017 MEDICARE YEARLY EXAM 10/13/2017 HEMOGLOBIN A1C Q6M 1/3/2018 LIPID PANEL Q1 3/1/2018 DTaP/Tdap/Td series (2 - Td) 12/10/2020 Allergies as of 7/12/2017  Review Complete On: 7/12/2017 By: Hope Aparicio MD  
 No Known Allergies Current Immunizations  Never Reviewed Name Date Influenza Vaccine 10/15/2015, 12/31/2013, 1/2/2013, 11/2/2009 Influenza Vaccine (Quad) PF 10/3/2016 Pneumococcal Vaccine (Unspecified Type) 11/2/2009 Tdap 12/10/2010 Not reviewed this visit You Were Diagnosed With   
  
 Codes Comments Essential hypertension    -  Primary ICD-10-CM: I10 
ICD-9-CM: 401.9 Controlled type 2 diabetes mellitus with microalbuminuria, without long-term current use of insulin (HCC)     ICD-10-CM: E11.29, R80.9 ICD-9-CM: 250.40, 791.0 Pure hypercholesterolemia     ICD-10-CM: E78.00 ICD-9-CM: 272.0 Osteopenia, unspecified location     ICD-10-CM: M85.80 ICD-9-CM: 733.90 Vitals BP Pulse Temp Resp Height(growth percentile) Weight(growth percentile) 148/82 (BP 1 Location: Right arm, BP Patient Position: Sitting) 76 96.7 °F (35.9 °C) (Oral) 14 4' 11\" (1.499 m) 127 lb 3.2 oz (57.7 kg) SpO2 BMI OB Status Smoking Status 96% 25.69 kg/m2 Menopause Never Smoker BMI and BSA Data Body Mass Index Body Surface Area  
 25.69 kg/m 2 1.55 m 2 Preferred Pharmacy Pharmacy Name Phone [facility]/PHARMACY #[id number], [address] [place] [address] [phone] Your Updated Medication List  
  
   
This list is accurate as of: 7/12/17 11:40 AM.  Always use your most recent med list.  
  
  
  
  
 aspirin delayed-release 81 mg tablet Take  by mouth daily. calcium-cholecalciferol (D3) tablet Commonly known as:  Calcium 600 + D Take 1 Tab by mouth two (2) times a day. glucose blood VI test strips strip Commonly known as:  blood glucose test  
Check BS BID. Dx code: E11.65  
  
 metFORMIN 500 mg tablet Commonly known as:  GLUCOPHAGE Take 1 Tab by mouth two (2) times daily (with meals). metoprolol succinate 25 mg XL tablet Commonly known as:  TOPROL-XL Take 1 Tab by mouth daily. PRAVACHOL 40 mg tablet Generic drug:  pravastatin Take 40 mg by mouth nightly. valsartan-hydroCHLOROthiazide 320-25 mg per tablet Commonly known as:  DIOVAN-HCT Take 1 Tab by mouth daily. Prescriptions Sent to Pharmacy Refills  
 metoprolol succinate (TOPROL-XL) 25 mg XL tablet 3 Sig: Take 1 Tab by mouth daily. Class: Normal  
 Pharmacy: [address] [place] [address]  #: [phone] Route: Oral  
  
Follow-up Instructions Return in about 1 week (around 7/19/2017) for Go over lab/imaging results, Follow up hypertension, Follow up hyperlipidemia, Follow up diabetes me. Patient Instructions Acute High Blood Pressure: Care Instructions Your Care Instructions Acute high blood pressure is very high blood pressure. It's a serious problem. Very high blood pressure can damage your brain, heart, eyes, and kidneys. You may have been given medicines to lower your blood pressure. You may have gotten them as pills or through a needle in one of your veins. This is called an IV. And maybe you were given other medicines too.  These can be needed when high blood pressure causes other problems. To keep your blood pressure at a lower level, you may need to make healthy lifestyle changes. And you will probably need to take medicines. Be sure to follow up with your doctor about your blood pressure and what you can do about it. Follow-up care is a key part of your treatment and safety. Be sure to make and go to all appointments, and call your doctor if you are having problems. It's also a good idea to know your test results and keep a list of the medicines you take. How can you care for yourself at home? · See your doctor as often as he or she recommends. This is to make sure your blood pressure is under control. You will probably go at least 2 times a year. But it may be more often at first. 
· Take your blood pressure medicine exactly as prescribed. You may take one or more types. They include diuretics, beta-blockers, ACE inhibitors, calcium channel blockers, and angiotensin II receptor blockers. Call your doctor if you think you are having a problem with your medicine. · If you take blood pressure medicine, talk to your doctor before you take decongestants or anti-inflammatory medicine, such as ibuprofen. These can raise blood pressure. · Learn how to check your blood pressure at home. Check it often. · Ask your doctor if it's okay to drink alcohol. · Talk to your doctor about lifestyle changes that can help blood pressure. These include being active and not smoking. When should you call for help? Call 911 anytime you think you may need emergency care. This may mean having symptoms that suggest that your blood pressure is causing a serious heart or blood vessel problem. Your blood pressure may be over 180/110. For example, call 911 if: 
· You have symptoms of a heart attack. These may include: ¨ Chest pain or pressure, or a strange feeling in the chest. 
¨ Sweating. ¨ Shortness of breath. ¨ Nausea or vomiting. ¨ Pain, pressure, or a strange feeling in the back, neck, jaw, or upper belly or in one or both shoulders or arms. ¨ Lightheadedness or sudden weakness. ¨ A fast or irregular heartbeat. · You have symptoms of a stroke. These may include: 
¨ Sudden numbness, tingling, weakness, or loss of movement in your face, arm, or leg, especially on only one side of your body. ¨ Sudden vision changes. ¨ Sudden trouble speaking. ¨ Sudden confusion or trouble understanding simple statements. ¨ Sudden problems with walking or balance. ¨ A sudden, severe headache that is different from past headaches. · You have severe back or belly pain. Do not wait until your blood pressure comes down on its own. Get help right away. Call your doctor now or seek immediate care if: 
· Your blood pressure is much higher than normal (such as 180/110 or higher), but you don't have symptoms. · You think high blood pressure is causing symptoms, such as: ¨ Severe headache. ¨ Blurry vision. Watch closely for changes in your health, and be sure to contact your doctor if: 
· Your blood pressure measures 140/90 or higher at least 2 times. That means the top number is 140 or higher or the bottom number is 90 or higher, or both. · You think you may be having side effects from your blood pressure medicine. · Your blood pressure is usually normal, but it goes above normal at least 2 times. Where can you learn more? Go to http://shashank-louie.info/. Enter P984 in the search box to learn more about \"Acute High Blood Pressure: Care Instructions. \" Current as of: August 8, 2016 Content Version: 11.3 © 1436-6694 [employer]. Care instructions adapted under license by [employer] (which disclaims liability or warranty for this information).  If you have questions about a medical condition or this instruction, always ask your healthcare professional. [employer] disclaims any warranty or liability for your use of this information. Please provide this summary of care documentation to your next provider. Your primary care clinician is listed as Aniya Rodriguez. If you have any questions after today's visit, please call [phone].

## 2017-07-12 NOTE — PATIENT INSTRUCTIONS
Acute High Blood Pressure: Care Instructions  Your Care Instructions  Acute high blood pressure is very high blood pressure. It's a serious problem. Very high blood pressure can damage your brain, heart, eyes, and kidneys. You may have been given medicines to lower your blood pressure. You may have gotten them as pills or through a needle in one of your veins. This is called an IV. And maybe you were given other medicines too. These can be needed when high blood pressure causes other problems. To keep your blood pressure at a lower level, you may need to make healthy lifestyle changes. And you will probably need to take medicines. Be sure to follow up with your doctor about your blood pressure and what you can do about it. Follow-up care is a key part of your treatment and safety. Be sure to make and go to all appointments, and call your doctor if you are having problems. It's also a good idea to know your test results and keep a list of the medicines you take. How can you care for yourself at home? · See your doctor as often as he or she recommends. This is to make sure your blood pressure is under control. You will probably go at least 2 times a year. But it may be more often at first.  · Take your blood pressure medicine exactly as prescribed. You may take one or more types. They include diuretics, beta-blockers, ACE inhibitors, calcium channel blockers, and angiotensin II receptor blockers. Call your doctor if you think you are having a problem with your medicine. · If you take blood pressure medicine, talk to your doctor before you take decongestants or anti-inflammatory medicine, such as ibuprofen. These can raise blood pressure. · Learn how to check your blood pressure at home. Check it often. · Ask your doctor if it's okay to drink alcohol. · Talk to your doctor about lifestyle changes that can help blood pressure. These include being active and not smoking.   When should you call for help?  Call 911 anytime you think you may need emergency care. This may mean having symptoms that suggest that your blood pressure is causing a serious heart or blood vessel problem. Your blood pressure may be over 180/110. For example, call 911 if:  · You have symptoms of a heart attack. These may include:  ¨ Chest pain or pressure, or a strange feeling in the chest.  ¨ Sweating. ¨ Shortness of breath. ¨ Nausea or vomiting. ¨ Pain, pressure, or a strange feeling in the back, neck, jaw, or upper belly or in one or both shoulders or arms. ¨ Lightheadedness or sudden weakness. ¨ A fast or irregular heartbeat. · You have symptoms of a stroke. These may include:  ¨ Sudden numbness, tingling, weakness, or loss of movement in your face, arm, or leg, especially on only one side of your body. ¨ Sudden vision changes. ¨ Sudden trouble speaking. ¨ Sudden confusion or trouble understanding simple statements. ¨ Sudden problems with walking or balance. ¨ A sudden, severe headache that is different from past headaches. · You have severe back or belly pain. Do not wait until your blood pressure comes down on its own. Get help right away. Call your doctor now or seek immediate care if:  · Your blood pressure is much higher than normal (such as 180/110 or higher), but you don't have symptoms. · You think high blood pressure is causing symptoms, such as:  ¨ Severe headache. ¨ Blurry vision. Watch closely for changes in your health, and be sure to contact your doctor if:  · Your blood pressure measures 140/90 or higher at least 2 times. That means the top number is 140 or higher or the bottom number is 90 or higher, or both. · You think you may be having side effects from your blood pressure medicine. · Your blood pressure is usually normal, but it goes above normal at least 2 times. Where can you learn more? Go to http://shashank-louie.info/.   Enter M959 in the search box to learn more about \"Acute High Blood Pressure: Care Instructions. \"  Current as of: August 8, 2016  Content Version: 11.3  © 3932-9844 [employer]. Care instructions adapted under license by [employer] (which disclaims liability or warranty for this information). If you have questions about a medical condition or this instruction, always ask your healthcare professional. [address] any warranty or liability for your use of this information.

## 2017-07-12 NOTE — PROGRESS NOTES
Marbin Goldstein is a 80 y.o. female presented to clinic accompanied by her daughter for a f/u htn and dm. Pt denies any pain or concerns at this time. 1. Have you been to the ER, urgent care clinic since your last visit? Hospitalized since your last visit? No    2. Have you seen or consulted any other health care providers outside of the [address] [place] [address] since your last visit? Include any pap smears or colon screening.  No     Learning Assessment 10/12/2016   PRIMARY LEARNER Patient   PRIMARY LANGUAGE ENGLISH   LEARNER PREFERENCE PRIMARY DEMONSTRATION   ANSWERED BY patient   RELATIONSHIP SELF

## 2017-09-25 ENCOUNTER — OFFICE VISIT (OUTPATIENT)
Dept: FAMILY MEDICINE CLINIC | Facility: CLINIC | Age: 82
End: 2017-09-25

## 2017-09-25 VITALS
HEART RATE: 62 BPM | WEIGHT: 128.2 LBS | TEMPERATURE: 96.9 F | RESPIRATION RATE: 20 BRPM | SYSTOLIC BLOOD PRESSURE: 154 MMHG | BODY MASS INDEX: 25.84 KG/M2 | HEIGHT: 59 IN | OXYGEN SATURATION: 100 % | DIASTOLIC BLOOD PRESSURE: 98 MMHG

## 2017-09-25 DIAGNOSIS — R80.9 CONTROLLED TYPE 2 DIABETES MELLITUS WITH MICROALBUMINURIA, WITHOUT LONG-TERM CURRENT USE OF INSULIN (HCC): ICD-10-CM

## 2017-09-25 DIAGNOSIS — H81.10 BENIGN POSITIONAL VERTIGO, UNSPECIFIED LATERALITY: ICD-10-CM

## 2017-09-25 DIAGNOSIS — Z12.11 SCREEN FOR COLON CANCER: ICD-10-CM

## 2017-09-25 DIAGNOSIS — E78.00 PURE HYPERCHOLESTEROLEMIA: ICD-10-CM

## 2017-09-25 DIAGNOSIS — E11.29 CONTROLLED TYPE 2 DIABETES MELLITUS WITH MICROALBUMINURIA, WITHOUT LONG-TERM CURRENT USE OF INSULIN (HCC): ICD-10-CM

## 2017-09-25 DIAGNOSIS — I10 ESSENTIAL HYPERTENSION: Primary | ICD-10-CM

## 2017-09-25 DIAGNOSIS — Z12.39 SCREENING FOR BREAST CANCER: ICD-10-CM

## 2017-09-25 DIAGNOSIS — E55.9 VITAMIN D DEFICIENCY: ICD-10-CM

## 2017-09-25 NOTE — PROGRESS NOTES
Chief Complaint   Patient presents with    Dizziness     3 months    Hypertension    Diabetes    Cholesterol Problem     Visit Vitals    BP (!) 154/98 (BP 1 Location: Left arm, BP Patient Position: Sitting)    Pulse 62    Temp 96.9 °F (36.1 °C) (Oral)    Resp 20    Ht 4' 11\" (1.499 m)    Wt 128 lb 3.2 oz (58.2 kg)    SpO2 100%    BMI 25.89 kg/m2        Patient in room # 3. Patient is not fasting. 1. Have you been to the ER, urgent care clinic since your last visit? Hospitalized since your last visit? No    2. Have you seen or consulted any other health care providers outside of the [address] [place] [address] since your last visit? Include any pap smears or colon screening. No     Reviewed. Patient self checked blood sugar today 120 mg/dl.

## 2017-09-25 NOTE — PROGRESS NOTES
Internal Medicine Progress Note    Today's Date:  2017   Patient:  Dinh Arias  Patient :  [id number]    Subjective:     Chief Complaint   Patient presents with    Dizziness     3 months    Hypertension    Diabetes    Cholesterol Problem      Dizziness  This is a chronic problem. This is not at goal. This has been present for three months. Pt could not tolerate meclizine. Hypertension   This is a chronic problem. BP is not at goal in the office. Pt takes diovan/HCTZ and toprol. Pt reports compliance with these medications. Osteopenia  This is a chronic problem. This is stable. Pt is off fosamax. Pt was on fosamax for two years. Last dexa scan was in . Pt is on calcium/Vitamin D.       Diabetes mellitus  This is a chronic problem. This is controlled. Pt takes metformin. Pt is on aspirin and statin.  +microalbuminuria.      Past Medical History:   Diagnosis Date    Advance directive discussed with patient 10/12/2016    Cigarette nicotine dependence in remission 10/12/2016    Dizziness 3/16/2017    Essential hypertension 10/3/2016    Microalbuminuria 2017    Osteopenia 10/3/2016    Osteoporosis     Pure hypercholesterolemia 2017    Type II diabetes mellitus ([facility] [address] 10/3/2016    Vitamin D deficiency 2017     History reviewed. No pertinent surgical history. reports that she has never smoked. She has never used smokeless tobacco. She reports that she does not drink alcohol or use illicit drugs.   Family History   Problem Relation Age of Onset    Hypertension Mother     Cancer Mother      pancreas    Cancer Father      lung     No Known Allergies  Review of Systems   Positives in bold  CV:      chest pain, palpitations  PULM:  SOB, wheezing, cough, sputum production    Current Outpatient Meds and Allergies     Current Outpatient Prescriptions on File Prior to Visit   Medication Sig Dispense Refill    valsartan-hydroCHLOROthiazide (DIOVAN-HCT) 320-25 mg per tablet Take 1 Tab by mouth daily.  pravastatin (PRAVACHOL) 40 mg tablet Take 40 mg by mouth nightly.  metoprolol succinate (TOPROL-XL) 25 mg XL tablet Take 1 Tab by mouth daily. 90 Tab 3    metFORMIN (GLUCOPHAGE) 500 mg tablet Take 1 Tab by mouth two (2) times daily (with meals). 180 Tab 3    glucose blood VI test strips (BLOOD GLUCOSE TEST) strip Check BS BID. Dx code: E11.65 200 Strip 3    calcium-cholecalciferol, D3, (CALCIUM 600 + D) tablet Take 1 Tab by mouth two (2) times a day. 180 Tab 3    aspirin delayed-release 81 mg tablet Take  by mouth daily. No current facility-administered medications on file prior to visit. No Known Allergies  Objective:     VS:    Visit Vitals    BP (!) 154/98 (BP 1 Location: Left arm, BP Patient Position: Sitting)  Comment (BP 1 Location): manual    Pulse 62    Temp 96.9 °F (36.1 °C) (Oral)    Resp 20    Ht 4' 11\" (1.499 m)    Wt 128 lb 3.2 oz (58.2 kg)    SpO2 100%    BMI 25.89 kg/m2     General:   Well-nourished, well-groomed, pleasant, alert, in no acute distress  Ears:  External ears WNL, TMs WNL  Eyes:  EOMI, PERRL  Nose:  External nares WNL  Psych:  No pressured speech, no abnormal thought content  Gait:   normal  Eppley manuever: no dizziness with laying back on the right or left, no nystagmus    Office Visit on 07/03/2017   Component Date Value Ref Range Status    Hemoglobin A1c (POC) 07/03/2017 7.0  % Final     Assessment/Plan & Orders:         ICD-10-CM ICD-9-CM    1. Essential hypertension I10 401.9 CBC WITH AUTOMATED DIFF   2. Benign positional vertigo, unspecified laterality H81.10 386.11    3. Controlled type 2 diabetes mellitus with microalbuminuria, without long-term current use of insulin (HCC) E11.29 250.40 HEMOGLOBIN A1C WITH EAG    R80.9 791.0 MICROALBUMIN, UR, RAND W/ MICROALBUMIN/CREA RATIO   4. Pure hypercholesterolemia E78.00 272.0 LIPID PANEL      METABOLIC PANEL, COMPREHENSIVE      TSH 3RD GENERATION      T4, FREE   5.  Vitamin D deficiency E55.9 268.9 VITAMIN D, 25 HYDROXY   6. Screen for colon cancer Z12.11 V76.51 OCCULT BLOOD, IMMUNOASSAY (FIT)   7. Screening for breast cancer Z12.39 V76.10 CLAUDE MAMMO BI SCREENING INCL CAD     Healthy lifestyle has been encouraged including avoidance of tobacco, limiting or avoiding alcohol intake, heart healthy diet which is low in cholesterol and saturated fat and contains fresh fruits, vegetables and whole grains and fiber, regular exercise with goals of 20-30 minutes 3-5 days weekly and maintaining an optimal BMI. Pt to get eye exam. Form given   Check BP at home and bring BP log  Pt given instructions on [employer] [employer]    Follow-up Disposition:  Return in about 1 month (around 10/25/2017) for Follow up hypertension, Follow up diabetes mellitus, Follow up hyperlipidemia, Go over lab/imaging r. *Patient verbalized understanding and agreement with the plan. Patient was given an after-visit summary. Armani Flanagan.  [address], MD - Internal Medicine  9/27/2017, 11:20 AM  [facility]  [address] [facility] Roxanne Abdi, [address]  Phone [phone]  Fax [phone]

## 2017-09-25 NOTE — PATIENT INSTRUCTIONS
Epley Maneuver at Home for Vertigo: Exercises  Your Care Instructions  Vertigo is a spinning or whirling sensation when you move your head. Your doctor may have moved you in different positions to help your vertigo get better faster. This is called the Epley maneuver. Your doctor also may have asked you to do these exercises at home. Do the exercises as often as your doctor recommends. If your vertigo is getting worse, your doctor may have you change the exercise or stop it. How to do the exercises  Step 1    Sit on the edge of a bed or sofa. Step 2    Turn your head 45 degrees in the direction your doctor told you to. This may be toward the ear that causes the most vertigo for you. Step 3    Tilt yourself backward until you are lying on your back. Your head should still be at a 45-degree turn. Your head should be about midway between looking straight ahead and looking out to your side. Hold for 30 seconds. If you have vertigo, stay in this position until it stops. Step 4    Turn your head 90 degrees toward the ear that has the least vertigo. The point of your chin should be over your shoulder. Hold for 30 seconds. Step 5    Roll onto the side of the ear with the least vertigo. You should now be looking at the floor. Follow-up care is a key part of your treatment and safety. Be sure to make and go to all appointments, and call your doctor if you are having problems. It's also a good idea to know your test results and keep a list of the medicines you take. Where can you learn more? Go to http://shashank-louie.info/. Enter 470 [id number] in the search box to learn more about \"Epley Maneuver at Home for Vertigo: Exercises. \"  Current as of: March 8, 2017  Content Version: 11.3  © 3437-8650 [employer]wise, Incorporated. Care instructions adapted under license by [employer] (which disclaims liability or warranty for this information).  If you have questions about a medical condition or this instruction, always ask your healthcare professional. [place] [address] any warranty or liability for your use of this information.

## 2017-09-25 NOTE — MR AVS SNAPSHOT
Visit Information Date & Time Provider Department Dept. Phone Encounter #  
 9/25/2017  3:15 PM Ivis Yip MD [facility] [phone] [id number] Follow-up Instructions Return in about 3 months (around 12/25/2017). Upcoming Health Maintenance Date Due  
 EYE EXAM RETINAL OR DILATED Q1 1/25/2017 FOOT EXAM Q1 10/12/2017 MICROALBUMIN Q1 10/12/2017 MEDICARE YEARLY EXAM 10/13/2017 HEMOGLOBIN A1C Q6M 1/3/2018 GLAUCOMA SCREENING Q2Y 1/25/2018 LIPID PANEL Q1 3/1/2018 DTaP/Tdap/Td series (2 - Td) 12/10/2020 Allergies as of 9/25/2017  Review Complete On: 9/25/2017 By: Marquis Rahat LPN No Known Allergies Current Immunizations  Never Reviewed Name Date Influenza Vaccine 10/15/2015, 12/31/2013, 1/2/2013, 11/2/2009 Influenza Vaccine (Quad) PF 10/3/2016 Pneumococcal Vaccine (Unspecified Type) 11/2/2009 Tdap 12/10/2010 Not reviewed this visit You Were Diagnosed With   
  
 Codes Comments Essential hypertension    -  Primary ICD-10-CM: I10 
ICD-9-CM: 401.9 Controlled type 2 diabetes mellitus with microalbuminuria, without long-term current use of insulin (HCC)     ICD-10-CM: E11.29, R80.9 ICD-9-CM: 250.40, 791.0 Pure hypercholesterolemia     ICD-10-CM: E78.00 ICD-9-CM: 272.0 Screen for colon cancer     ICD-10-CM: Z12.11 ICD-9-CM: V76.51 Screening for breast cancer     ICD-10-CM: Z12.39 
ICD-9-CM: V76.10 Vitamin D deficiency     ICD-10-CM: E55.9 ICD-9-CM: 268.9 Vitals BP Pulse Temp Resp Height(growth percentile) Weight(growth percentile) (!) 154/98 (BP 1 Location: Left arm, BP Patient Position: Sitting) 62 96.9 °F (36.1 °C) (Oral) 20 4' 11\" (1.499 m) 128 lb 3.2 oz (58.2 kg) SpO2 BMI OB Status Smoking Status 100% 25.89 kg/m2 Menopause Never Smoker Vitals History BMI and BSA Data  Body Mass Index Body Surface Area  
 25.89 kg/m 2 1.56 m 2  
  
  
 Preferred Pharmacy Pharmacy Name Phone [id number] [phone], [address] ,[phone] Your Updated Medication List  
  
   
This list is accurate as of: 9/25/17  4:44 PM.  Always use your most recent med list.  
  
  
  
  
 aspirin delayed-release 81 mg tablet Take  by mouth daily. calcium-cholecalciferol (D3) tablet Commonly known as:  Calcium 600 + D Take 1 Tab by mouth two (2) times a day. glucose blood VI test strips strip Commonly known as:  blood glucose test  
Check BS BID. Dx code: E11.65  
  
 metFORMIN 500 mg tablet Commonly known as:  GLUCOPHAGE Take 1 Tab by mouth two (2) times daily (with meals). metoprolol succinate 25 mg XL tablet Commonly known as:  TOPROL-XL Take 1 Tab by mouth daily. PRAVACHOL 40 mg tablet Generic drug:  pravastatin Take 40 mg by mouth nightly. valsartan-hydroCHLOROthiazide 320-25 mg per tablet Commonly known as:  DIOVAN-HCT Take 1 Tab by mouth daily. Follow-up Instructions Return in about 3 months (around 12/25/2017). To-Do List   
 09/25/2017 Lab:  HEMOGLOBIN A1C WITH EAG   
  
 09/25/2017 Imaging:  CLAUDE MAMMO BI SCREENING INCL CAD   
  
 09/25/2017 Lab:  MICROALBUMIN, UR, RAND W/ MICROALBUMIN/CREA RATIO   
  
 09/25/2017 Lab:  T4, FREE   
  
 09/25/2017 Lab:  TSH 3RD GENERATION   
  
 09/25/2017 Lab:  VITAMIN D, 25 HYDROXY   
  
 09/28/2017 Lab:  CBC WITH AUTOMATED DIFF   
  
 09/28/2017 Lab:  LIPID PANEL   
  
 09/28/2017 Lab:  METABOLIC PANEL, COMPREHENSIVE   
  
 10/25/2017 Lab:  OCCULT BLOOD, IMMUNOASSAY (FIT) Patient Instructions Epley Maneuver at Home for Vertigo: Exercises Your Care Instructions Vertigo is a spinning or whirling sensation when you move your head.  
Your doctor may have moved you in different positions to help your vertigo get better faster. This is called the Epley maneuver. Your doctor also may have asked you to do these exercises at home. Do the exercises as often as your doctor recommends. If your vertigo is getting worse, your doctor may have you change the exercise or stop it. How to do the exercises Step 1 Sit on the edge of a bed or sofa. Step 2 Turn your head 45 degrees in the direction your doctor told you to. This may be toward the ear that causes the most vertigo for you. Step 3 Tilt yourself backward until you are lying on your back. Your head should still be at a 45-degree turn. Your head should be about midway between looking straight ahead and looking out to your side. Hold for 30 seconds. If you have vertigo, stay in this position until it stops. Step 4 Turn your head 90 degrees toward the ear that has the least vertigo. The point of your chin should be over your shoulder. Hold for 30 seconds. Step 5 Roll onto the side of the ear with the least vertigo. You should now be looking at the floor. Follow-up care is a key part of your treatment and safety. Be sure to make and go to all appointments, and call your doctor if you are having problems. It's also a good idea to know your test results and keep a list of the medicines you take. Where can you learn more? Go to http://shashank-louie.info/. Enter [phone] in the search box to learn more about \"Epley Maneuver at Home for Vertigo: Exercises. \" Current as of: March 8, 2017 Content Version: 11.3 © 0318-0437 [employer]. Care instructions adapted under license by [employer] (which disclaims liability or warranty for this information). If you have questions about a medical condition or this instruction, always ask your healthcare professional. [address] any warranty or liability for your use of this information. Please provide this summary of care documentation to your next provider. Your primary care clinician is listed as Paige Bailey. If you have any questions after today's visit, please call [phone].

## 2017-09-28 DIAGNOSIS — I10 ESSENTIAL HYPERTENSION: ICD-10-CM

## 2017-09-28 DIAGNOSIS — E78.00 PURE HYPERCHOLESTEROLEMIA: ICD-10-CM

## 2017-10-25 ENCOUNTER — HOSPITAL ENCOUNTER (OUTPATIENT)
Dept: LAB | Age: 82
Discharge: HOME | End: 2017-10-25
Payer: MEDICARE

## 2017-10-25 ENCOUNTER — OFFICE VISIT (OUTPATIENT)
Dept: FAMILY MEDICINE CLINIC | Facility: CLINIC | Age: 82
End: 2017-10-25

## 2017-10-25 VITALS
DIASTOLIC BLOOD PRESSURE: 108 MMHG | HEART RATE: 59 BPM | BODY MASS INDEX: 26.87 KG/M2 | WEIGHT: 128 LBS | OXYGEN SATURATION: 99 % | TEMPERATURE: 96 F | HEIGHT: 58 IN | RESPIRATION RATE: 16 BRPM | SYSTOLIC BLOOD PRESSURE: 198 MMHG

## 2017-10-25 DIAGNOSIS — Z12.11 SCREEN FOR COLON CANCER: ICD-10-CM

## 2017-10-25 DIAGNOSIS — Z71.89 ADVANCE DIRECTIVE DISCUSSED WITH PATIENT: ICD-10-CM

## 2017-10-25 DIAGNOSIS — E78.00 PURE HYPERCHOLESTEROLEMIA: ICD-10-CM

## 2017-10-25 DIAGNOSIS — R80.9 CONTROLLED TYPE 2 DIABETES MELLITUS WITH MICROALBUMINURIA, WITHOUT LONG-TERM CURRENT USE OF INSULIN (HCC): ICD-10-CM

## 2017-10-25 DIAGNOSIS — I10 ESSENTIAL HYPERTENSION: ICD-10-CM

## 2017-10-25 DIAGNOSIS — E11.29 CONTROLLED TYPE 2 DIABETES MELLITUS WITH MICROALBUMINURIA, WITHOUT LONG-TERM CURRENT USE OF INSULIN (HCC): ICD-10-CM

## 2017-10-25 DIAGNOSIS — Z23 ENCOUNTER FOR IMMUNIZATION: ICD-10-CM

## 2017-10-25 DIAGNOSIS — M85.80 OSTEOPENIA, UNSPECIFIED LOCATION: ICD-10-CM

## 2017-10-25 DIAGNOSIS — Z00.00 MEDICARE ANNUAL WELLNESS VISIT, SUBSEQUENT: Primary | ICD-10-CM

## 2017-10-25 LAB
25(OH)D3 SERPL-MCNC: 26.5 NG/ML (ref 30–100)
ALBUMIN SERPL-MCNC: 3.7 G/DL (ref 3.4–5)
ALBUMIN/GLOB SERPL: 1.1 {RATIO} (ref 0.8–1.7)
ALP SERPL-CCNC: 69 U/L (ref 45–117)
ALT SERPL-CCNC: 21 U/L (ref 13–56)
ANION GAP SERPL CALC-SCNC: 9 MMOL/L (ref 3–18)
AST SERPL-CCNC: 11 U/L (ref 15–37)
BASOPHILS # BLD: 0 K/UL (ref 0–0.06)
BASOPHILS NFR BLD: 0 % (ref 0–2)
BILIRUB SERPL-MCNC: 0.3 MG/DL (ref 0.2–1)
BUN SERPL-MCNC: 21 MG/DL (ref 7–18)
BUN/CREAT SERPL: 28 (ref 12–20)
CALCIUM SERPL-MCNC: 9.1 MG/DL (ref 8.5–10.1)
CHLORIDE SERPL-SCNC: 104 MMOL/L (ref 100–108)
CHOLEST SERPL-MCNC: 279 MG/DL
CO2 SERPL-SCNC: 29 MMOL/L (ref 21–32)
CREAT SERPL-MCNC: 0.75 MG/DL (ref 0.6–1.3)
CREAT UR-MCNC: 76.31 MG/DL (ref 30–125)
DIFFERENTIAL METHOD BLD: ABNORMAL
EOSINOPHIL # BLD: 0.1 K/UL (ref 0–0.4)
EOSINOPHIL NFR BLD: 2 % (ref 0–5)
ERYTHROCYTE [DISTWIDTH] IN BLOOD BY AUTOMATED COUNT: 13.5 % (ref 11.6–14.5)
GLOBULIN SER CALC-MCNC: 3.4 G/DL (ref 2–4)
GLUCOSE POC: 126 MG/DL
GLUCOSE SERPL-MCNC: 114 MG/DL (ref 74–99)
HBA1C MFR BLD HPLC: 6.3 %
HCT VFR BLD AUTO: 36.9 % (ref 35–45)
HDLC SERPL-MCNC: 68 MG/DL (ref 40–60)
HDLC SERPL: 4.1 {RATIO} (ref 0–5)
HGB BLD-MCNC: 12.1 G/DL (ref 12–16)
LDLC SERPL CALC-MCNC: 189.4 MG/DL (ref 0–100)
LIPID PROFILE,FLP: ABNORMAL
LYMPHOCYTES # BLD: 2.3 K/UL (ref 0.9–3.6)
LYMPHOCYTES NFR BLD: 42 % (ref 21–52)
MCH RBC QN AUTO: 30.8 PG (ref 24–34)
MCHC RBC AUTO-ENTMCNC: 32.8 G/DL (ref 31–37)
MCV RBC AUTO: 93.9 FL (ref 74–97)
MICROALBUMIN UR-MCNC: 8.7 MG/DL (ref 0–3)
MICROALBUMIN/CREAT UR-RTO: 114 MG/G (ref 0–30)
MONOCYTES # BLD: 0.5 K/UL (ref 0.05–1.2)
MONOCYTES NFR BLD: 9 % (ref 3–10)
NEUTS SEG # BLD: 2.5 K/UL (ref 1.8–8)
NEUTS SEG NFR BLD: 47 % (ref 40–73)
PLATELET # BLD AUTO: 249 K/UL (ref 135–420)
PMV BLD AUTO: 12.3 FL (ref 9.2–11.8)
POTASSIUM SERPL-SCNC: 3.5 MMOL/L (ref 3.5–5.5)
PROT SERPL-MCNC: 7.1 G/DL (ref 6.4–8.2)
RBC # BLD AUTO: 3.93 M/UL (ref 4.2–5.3)
SODIUM SERPL-SCNC: 142 MMOL/L (ref 136–145)
T4 FREE SERPL-MCNC: 0.9 NG/DL (ref 0.7–1.5)
TRIGL SERPL-MCNC: 108 MG/DL (ref ?–150)
TSH SERPL DL<=0.05 MIU/L-ACNC: 2.38 UIU/ML (ref 0.36–3.74)
VLDLC SERPL CALC-MCNC: 21.6 MG/DL
WBC # BLD AUTO: 5.4 K/UL (ref 4.6–13.2)

## 2017-10-25 PROCEDURE — 84439 ASSAY OF FREE THYROXINE: CPT | Performed by: INTERNAL MEDICINE

## 2017-10-25 PROCEDURE — 84443 ASSAY THYROID STIM HORMONE: CPT | Performed by: INTERNAL MEDICINE

## 2017-10-25 PROCEDURE — 85025 COMPLETE CBC W/AUTO DIFF WBC: CPT | Performed by: INTERNAL MEDICINE

## 2017-10-25 PROCEDURE — 82306 VITAMIN D 25 HYDROXY: CPT | Performed by: INTERNAL MEDICINE

## 2017-10-25 PROCEDURE — 80061 LIPID PANEL: CPT | Performed by: INTERNAL MEDICINE

## 2017-10-25 PROCEDURE — 80053 COMPREHEN METABOLIC PANEL: CPT | Performed by: INTERNAL MEDICINE

## 2017-10-25 PROCEDURE — 82043 UR ALBUMIN QUANTITATIVE: CPT | Performed by: INTERNAL MEDICINE

## 2017-10-25 NOTE — ACP (ADVANCE CARE PLANNING)
Pt would like to appoint her daughter, Blu Perez, as her medical decision maker in the event that she can no longer do so. Phone number is [phone]. Her secondary person is her [employer]. Phone number is [phone]. If her death is imminent and medical treatment will not help her recover, pt does not want life prolonging measures. If her condition makes her unaware of herself or her surroundings and she cannot interact with others, pt does not want life prolonging measures. Advance directive scanned in the chart under media.

## 2017-10-25 NOTE — PROGRESS NOTES
Internal Medicine Progress Note    Today's Date:  2017   Patient:  Suzy Salvador  Patient :  1932    Subjective:     Chief Complaint   Patient presents with    Dizziness     3 months    Hypertension    Diabetes    Cholesterol Problem      Hypertension   This is a chronic problem. BP is not at goal in the office. Pt takes diovan/HCTZ and toprol. Pt reports compliance with these medications. Pt didn't take her bp medicines today. Her bp at home is at goal.     Osteopenia  This is a chronic problem. This is stable. Pt is off fosamax. Pt was on fosamax for two years. Last dexa scan was in . Pt is on calcium/Vitamin D.       Diabetes mellitus  This is a chronic problem. This is controlled. Pt takes metformin. Pt is on aspirin and statin.  +microalbuminuria.      Past Medical History:   Diagnosis Date    Advance directive discussed with patient 10/12/2016    Cigarette nicotine dependence in remission 10/12/2016    Dizziness 3/16/2017    Essential hypertension 10/3/2016    Microalbuminuria 2017    Osteopenia 10/3/2016    Osteoporosis     Pure hypercholesterolemia 2017    Type II diabetes mellitus ([facility] [address] 10/3/2016    Vitamin D deficiency 2017     History reviewed. No pertinent surgical history. reports that she has never smoked. She has never used smokeless tobacco. She reports that she does not drink alcohol or use illicit drugs. Family History   Problem Relation Age of Onset    Hypertension Mother     Cancer Mother      pancreas    Cancer Father      lung     No Known Allergies  Review of Systems   Positives in bold  CV:      chest pain, palpitations  PULM:  SOB, wheezing, cough, sputum production    Current Outpatient Meds and Allergies     Current Outpatient Prescriptions on File Prior to Visit   Medication Sig Dispense Refill    valsartan-hydroCHLOROthiazide (DIOVAN-HCT) 320-25 mg per tablet Take 1 Tab by mouth daily.       pravastatin (PRAVACHOL) 40 mg tablet Take 40 mg by mouth nightly.  metoprolol succinate (TOPROL-XL) 25 mg XL tablet Take 1 Tab by mouth daily. 90 Tab 3    metFORMIN (GLUCOPHAGE) 500 mg tablet Take 1 Tab by mouth two (2) times daily (with meals). 180 Tab 3    glucose blood VI test strips (BLOOD GLUCOSE TEST) strip Check BS BID. Dx code: E11.65 200 Strip 3    calcium-cholecalciferol, D3, (CALCIUM 600 + D) tablet Take 1 Tab by mouth two (2) times a day. 180 Tab 3    aspirin delayed-release 81 mg tablet Take  by mouth daily. No current facility-administered medications on file prior to visit. No Known Allergies  Objective:     VS:    Visit Vitals    BP (!) 154/98 (BP 1 Location: Left arm, BP Patient Position: Sitting)  Comment (BP 1 Location): manual    Pulse 62    Temp 96.9 °F (36.1 °C) (Oral)    Resp 20    Ht 4' 11\" (1.499 m)    Wt 128 lb 3.2 oz (58.2 kg)    SpO2 100%    BMI 25.89 kg/m2     General:   Well-nourished, well-groomed, pleasant, alert, in no acute distress  Ears:  External ears WNL, TMs WNL  Eyes:  EOMI, PERRL  Nose:  External nares WNL  Psych:  No pressured speech, no abnormal thought content  Diabetic foot exam: monofilament exam normal, no open lesions or or erythema    Office Visit on 10/25/2017   Component Date Value Ref Range Status    Hemoglobin A1c (POC) 10/25/2017 6.3  % Final    Glucose POC 10/25/2017 126  mg/dL Final     Assessment/Plan & Orders:         ICD-10-CM ICD-9-CM    1. Medicare annual wellness visit, subsequent Z00.00 V70.0    2. Essential hypertension I10 401.9    3. Controlled type 2 diabetes mellitus with microalbuminuria, without long-term current use of insulin ([facility]) E11.29 250.40  DIABETES FOOT EXAM    R80.9 791.0 AMB POC HEMOGLOBIN A1C      AMB POC GLUCOSE, QUANTITATIVE, BLOOD      MICROALBUMIN, UR, RAND W/ MICROALBUMIN/CREA RATIO   4. Pure hypercholesterolemia E78.00 272.0    5. Osteopenia, unspecified location M85.80 733.90    6.  Advance directive discussed with patient Z70.80 V65.49 ADVANCE CARE PLANNING FIRST 30 MINS      Healthy lifestyle has been encouraged including avoidance of tobacco, limiting or avoiding alcohol intake, heart healthy diet which is low in cholesterol and saturated fat and contains fresh fruits, vegetables and whole grains and fiber, regular exercise with goals of 20-30 minutes 3-5 days weekly and maintaining an optimal BMI. Pt to get eye exam. Form given   Pt to check bp at home and call us if >150/90    Follow-up Disposition:  Return in about 3 months (around 1/25/2018) for Follow up hypertension, Follow up diabetes mellitus, Follow up hyperlipidemia. *Patient verbalized understanding and agreement with the plan. Patient was given an after-visit summary. Jimmy Abdul.  [address] - Internal Medicine  9/27/2017, 11:20 AM  [facility]  [address] [place], [address]  Phone [phone]  Fax [phone]

## 2017-10-25 NOTE — MR AVS SNAPSHOT
Visit Information Date & Time Provider Department Dept. Phone Encounter #  
 10/25/2017  8:00 AM Nicky Borges MD [facility] [phone] [id number] Follow-up Instructions Return in about 3 months (around 1/25/2018) for Follow up hypertension, Follow up diabetes mellitus, Follow up hyperlipidemia. Your Appointments 12/27/2017 10:30 AM  
Follow Up with Nicky Borges MD  
[facility]) Appt Note: Return in about 3 months (around 12/25/2017). [address] [place] [address] [phone]  
[address] [facility] [address] Upcoming Health Maintenance Date Due  
 EYE EXAM RETINAL OR DILATED Q1 1/25/2017 FOOT EXAM Q1 10/12/2017 MICROALBUMIN Q1 10/12/2017 MEDICARE YEARLY EXAM 10/13/2017 HEMOGLOBIN A1C Q6M 1/3/2018 GLAUCOMA SCREENING Q2Y 1/25/2018 LIPID PANEL Q1 3/1/2018 DTaP/Tdap/Td series (2 - Td) 12/10/2020 Allergies as of 10/25/2017  Review Complete On: 10/25/2017 By: Cherilyn Cushing, LPN No Known Allergies Current Immunizations  Never Reviewed Name Date Influenza Vaccine 10/15/2015, 12/31/2013, 1/2/2013, 11/2/2009 Influenza Vaccine (Quad) PF 10/3/2016 Pneumococcal Vaccine (Unspecified Type) 11/2/2009 Tdap 12/10/2010 Not reviewed this visit You Were Diagnosed With   
  
 Codes Comments Medicare annual wellness visit, subsequent    -  Primary ICD-10-CM: Z00.00 ICD-9-CM: V70.0 Essential hypertension     ICD-10-CM: I10 
ICD-9-CM: 401.9 Controlled type 2 diabetes mellitus with microalbuminuria, without long-term current use of insulin (HCC)     ICD-10-CM: E11.29, R80.9 ICD-9-CM: 250.40, 791.0 Advance directive discussed with patient     ICD-10-CM: Z71.89 ICD-9-CM: V65.49 Vitals BP Pulse Temp Resp Height(growth percentile) Weight(growth percentile) (!) 198/108 (BP 1 Location: Left arm, BP Patient Position: Sitting) (!) 59 96 °F (35.6 °C) (Oral) 16 4' 10\" (1.473 m) 128 lb (58.1 kg) SpO2 BMI OB Status Smoking Status 99% 26.75 kg/m2 Menopause Never Smoker Vitals History BMI and BSA Data Body Mass Index Body Surface Area  
 26.75 kg/m 2 1.54 m 2 Preferred Pharmacy Pharmacy Name Phone [facility]Y #[id number]- [id number] [facility], [address] [place] [address],# [phone] Your Updated Medication List  
  
   
This list is accurate as of: 10/25/17  9:23 AM.  Always use your most recent med list.  
  
  
  
  
 aspirin delayed-release 81 mg tablet Take  by mouth daily. calcium-cholecalciferol (D3) tablet Commonly known as:  Calcium 600 + D Take 1 Tab by mouth two (2) times a day. glucose blood VI test strips strip Commonly known as:  blood glucose test  
Check BS BID. Dx code: E11.65  
  
 metFORMIN 500 mg tablet Commonly known as:  GLUCOPHAGE Take 1 Tab by mouth two (2) times daily (with meals). metoprolol succinate 25 mg XL tablet Commonly known as:  TOPROL-XL Take 1 Tab by mouth daily. PRAVACHOL 40 mg tablet Generic drug:  pravastatin Take 40 mg by mouth nightly. valsartan-hydroCHLOROthiazide 320-25 mg per tablet Commonly known as:  DIOVAN-HCT Take 1 Tab by mouth daily. We Performed the Following ADVANCE CARE PLANNING FIRST 30 MINS [55028 CPT(R)] AMB POC GLUCOSE, QUANTITATIVE, BLOOD [37484 CPT(R)] AMB POC HEMOGLOBIN A1C [57445 CPT(R)]  DIABETES FOOT EXAM [7 Custom] Follow-up Instructions Return in about 3 months (around 1/25/2018) for Follow up hypertension, Follow up diabetes mellitus, Follow up hyperlipidemia. To-Do List   
 10/25/2017 Lab:  MICROALBUMIN, UR, RAND W/ MICROALBUMIN/CREA RATIO Patient Instructions Schedule of Personalized Health Plan (Provide Copy to Patient) The best way to stay healthy is to live a healthy lifestyle. A healthy lifestyle includes regular exercise, eating a well-balanced diet, keeping a healthy weight and not smoking. Regular physical exams and screening tests are another important way to take care of yourself. Preventive exams provided by health care providers can find health problems early when treatment works best and can keep you from getting certain diseases or illnesses. Preventive services include exams, lab tests, screenings, shots, monitoring and information to help you take care of your own health. All people over 65 should have a pneumonia shot. Pneumonia shots are usually only needed once in a lifetime unless your doctor decides differently. All people over 65 should have a yearly flu shot. People over 65 are at medium to high risk for Hepatitis B. Three shots are needed for complete protection. In addition to your physical exam, some screening tests are recommended: 
 
Bone mass measurement (dexa scan) is recommended every two years Diabetes Mellitus screening is recommended every year. Glaucoma is an eye disease caused by high pressure in the eye. An eye exam is recommended every year. Cardiovascular screening tests that check your cholesterol and other blood fat (lipid) levels are recommended every five years. Colorectal Cancer screening tests help to find pre-cancerous polyps (growths in the colon) so they can be removed before they turn into cancer. Tests ordered for screening depend on your personal and family history risk factors. Screening for Breast Cancer is recommended yearly with a mammogram. 
 
Screening for Cervical Cancer is recommended every two years (annually for certain risk factors, such as previous history of STD or abnormal PAP in past 7 years), with a Pelvic Exam with PAP Here is a list of your current Health Maintenance items with a due date: 
Health Maintenance Topic Date Due  
  EYE EXAM RETINAL OR DILATED Q1  01/25/2017  
 FOOT EXAM Q1  10/12/2017  MICROALBUMIN Q1  10/12/2017  MEDICARE YEARLY EXAM  10/13/2017  
 HEMOGLOBIN A1C Q6M  01/03/2018  GLAUCOMA SCREENING Q2Y  01/25/2018  LIPID PANEL Q1  03/01/2018  DTaP/Tdap/Td series (2 - Td) 12/10/2020  
 OSTEOPOROSIS SCREENING (DEXA)  Completed  ZOSTER VACCINE AGE 60>  Addressed  Pneumococcal 65+ Low/Medium Risk  Addressed  INFLUENZA AGE 9 TO ADULT  Addressed Introducing [facility] [place] [facility]! Dear Mara Berry: Thank you for requesting a [employer] account. Our records indicate that you have previously registered for a [employer] account but its currently inactive. Please call our [employer] support line at [phone]. Additional Information If you have questions, please visit the Frequently Asked Questions section of the [employer] website at [email or web address] Remember, [employer] is NOT to be used for urgent needs. For medical emergencies, dial 911. Now available from your iPhone and Android! Please provide this summary of care documentation to your next provider. Your primary care clinician is listed as Zhane Medel. If you have any questions after today's visit, please call [phone].

## 2017-10-25 NOTE — PROGRESS NOTES
Jazmin Maldonado is a 80 y.o. female and presents for annual Medicare Wellness Visit. Problem List: Reviewed with patient and discussed risk factors. 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 No pertinent surgical history. SH: Reviewed with patient  Social History   Substance Use Topics    Smoking status: Never Smoker    Smokeless tobacco: Never Used    Alcohol use No     FH: Reviewed with patient  Family History   Problem Relation Age of Onset    Hypertension Mother     Cancer Mother      pancreas    Cancer Father      lung     Medications/Allergies: Reviewed with patient  Current Outpatient Prescriptions on File Prior to Visit   Medication Sig Dispense Refill    valsartan-hydroCHLOROthiazide (DIOVAN-HCT) 320-25 mg per tablet Take 1 Tab by mouth daily.  pravastatin (PRAVACHOL) 40 mg tablet Take 40 mg by mouth nightly.  metoprolol succinate (TOPROL-XL) 25 mg XL tablet Take 1 Tab by mouth daily. 90 Tab 3    metFORMIN (GLUCOPHAGE) 500 mg tablet Take 1 Tab by mouth two (2) times daily (with meals). 180 Tab 3    glucose blood VI test strips (BLOOD GLUCOSE TEST) strip Check BS BID. Dx code: E11.65 200 Strip 3    calcium-cholecalciferol, D3, (CALCIUM 600 + D) tablet Take 1 Tab by mouth two (2) times a day. 180 Tab 3    aspirin delayed-release 81 mg tablet Take  by mouth daily.        No current facility-administered medications on file prior to visit. No Known Allergies    Objective:  Visit Vitals    BP (!) 198/108 (BP 1 Location: Left arm, BP Patient Position: Sitting)  Comment: manual    Pulse (!) 59    Temp 96 °F (35.6 °C) (Oral)    Resp 16    Ht 4' 10\" (1.473 m)  Comment: measured    Wt 128 lb (58.1 kg)    SpO2 99%    BMI 26.75 kg/m2    Body mass index is 26.75 kg/(m^2). Assessment of cognitive impairment: Alert and oriented x 3  Depression Screen:   PHQ over the last two weeks 7/12/2017   PHQ Not Done -   Little interest or pleasure in doing things Not at all   Feeling down, depressed or hopeless Not at all   Total Score PHQ 2 0     Fall Risk Assessment:    Fall Risk Assessment, last 12 mths 7/12/2017   Able to walk? Yes   Fall in past 12 months? No   Fall with injury? -   Number of falls in past 12 months -   Fall Risk Score -     Functional Ability:   Does the patient exhibit a steady gait? yes   How long did it take the patient to get up and walk from a sitting position? 2 seconds   Is the patient self reliant?  (ie can do own laundry, meals, household chores)  yes   Does the patient handle his/her own medications? yes   Does the patient handle his/her own money? yes   Is the patients home safe (ie good lighting, handrails on stairs and bath, etc.)? yes   Did you notice or did patient express any hearing difficulties? no   Did you notice of did patient express any vision difficulties?   no   Were distance and reading eye charts used?   yes     Advance Care Planning:   Patient was offered the opportunity to discuss advance care planning:  yes     Does patient have an Advance Directive: yes   If no, did you provide information on Caring Connections?  no     Plan:    Orders Placed This Encounter    ADVANCE CARE PLANNING FIRST 30 MINS    MICROALBUMIN, UR, RAND W/ MICROALBUMIN/CREA RATIO    AMB POC HEMOGLOBIN A1C    AMB POC GLUCOSE, QUANTITATIVE, BLOOD    HM [address] [facility]. Maintenance   Topic Date Due    EYE EXAM RETINAL OR DILATED Q1  2017    FOOT EXAM Q1  10/12/2017    MICROALBUMIN Q1  10/12/2017    MEDICARE YEARLY EXAM  10/13/2017    HEMOGLOBIN A1C Q6M  2018    GLAUCOMA SCREENING Q2Y  2018    LIPID PANEL Q1  2018    DTaP/Tdap/Td series (2 - Td) 12/10/2020    OSTEOPOROSIS SCREENING (DEXA)  Completed    ZOSTER VACCINE AGE 60>  Addressed    Pneumococcal 65+ Low/Medium Risk  Addressed    INFLUENZA AGE 9 TO ADULT  Addressed     Time spent counseling pt on advanced care plannin minutes    *Patient verbalized understanding and agreement with the plan. A copy of the After Visit Summary with personalized health plan was given to the patient today. Follow-up Disposition:  Return in about 3 months (around 2018) for Follow up hypertension, Follow up diabetes mellitus, Follow up hyperlipidemia. Андрей Bassett.  [address], MD - Internal Medicine  10/25/2017, 11:17 AM  John Mejía1 [address] [place] [address] [place], [address]  Phone [phone]  Fax [phone]

## 2017-10-25 NOTE — PROGRESS NOTES
Patient presents for routine flu injection. She denies any symptoms , reactions or allergies that would exclude them from being immunized today. Risks and adverse reactions were discussed and the VIS was given to them. All questions were addressed. She was observed for 10 min post injection. There were no reactions observed.     Rosalino hSarma LPN

## 2017-10-25 NOTE — PATIENT INSTRUCTIONS
Schedule of Personalized Health Plan  (Provide Copy to Patient)  The best way to stay healthy is to live a healthy lifestyle. A healthy lifestyle includes regular exercise, eating a well-balanced diet, keeping a healthy weight and not smoking. Regular physical exams and screening tests are another important way to take care of yourself. Preventive exams provided by health care providers can find health problems early when treatment works best and can keep you from getting certain diseases or illnesses. Preventive services include exams, lab tests, screenings, shots, monitoring and information to help you take care of your own health. All people over 65 should have a pneumonia shot. Pneumonia shots are usually only needed once in a lifetime unless your doctor decides differently. All people over 65 should have a yearly flu shot. People over 65 are at medium to high risk for Hepatitis B. Three shots are needed for complete protection. In addition to your physical exam, some screening tests are recommended:    Bone mass measurement (dexa scan) is recommended every two years  Diabetes Mellitus screening is recommended every year. Glaucoma is an eye disease caused by high pressure in the eye. An eye exam is recommended every year. Cardiovascular screening tests that check your cholesterol and other blood fat (lipid) levels are recommended every five years. Colorectal Cancer screening tests help to find pre-cancerous polyps (growths in the colon) so they can be removed before they turn into cancer. Tests ordered for screening depend on your personal and family history risk factors.     Screening for Breast Cancer is recommended yearly with a mammogram.    Screening for Cervical Cancer is recommended every two years (annually for certain risk factors, such as previous history of STD or abnormal PAP in past 7 years), with a Pelvic Exam with PAP    Here is a list of your current Health Maintenance items with a due date:  Health Maintenance   Topic Date Due    EYE EXAM RETINAL OR DILATED Q1  01/25/2017    FOOT EXAM Q1  10/12/2017    MICROALBUMIN Q1  10/12/2017    MEDICARE YEARLY EXAM  10/13/2017    HEMOGLOBIN A1C Q6M  01/03/2018    GLAUCOMA SCREENING Q2Y  01/25/2018    LIPID PANEL Q1  03/01/2018    DTaP/Tdap/Td series (2 - Td) 12/10/2020    OSTEOPOROSIS SCREENING (DEXA)  Completed    ZOSTER VACCINE AGE 60>  Addressed    Pneumococcal 65+ Low/Medium Risk  Addressed    INFLUENZA AGE 9 TO ADULT  Addressed         Vaccine Information Statement    Influenza (Flu) Vaccine (Inactivated or Recombinant): What you need to know    Many Vaccine Information Statements are available in [place]ian and other languages. See www.immunize.org/vis  Hojas de Información Sobre Vacunas están disponibles en Español y en muchos otros idiomas. Visite www.immunize.org/vis    1. Why get vaccinated? Influenza (flu) is a contagious disease that spreads around the [facility] every year, usually between October and May. Flu is caused by influenza viruses, and is spread mainly by coughing, sneezing, and close contact. Anyone can get flu. Flu strikes suddenly and can last several days. Symptoms vary by age, but can include:   fever/chills   sore throat   muscle aches   fatigue   cough   headache    runny or stuffy nose    Flu can also lead to pneumonia and blood infections, and cause diarrhea and seizures in children. If you have a medical condition, such as heart or lung disease, flu can make it worse. Flu is more dangerous for some people. Infants and young children, people 72years of age and older, pregnant women, and people with certain health conditions or a weakened immune system are at greatest risk. Each year thousands of people in the [facility] die from flu, and many more are hospitalized.      Flu vaccine can:   keep you from getting flu,   make flu less severe if you do get it, and   keep you from spreading flu to your family and other people. 2. Inactivated and recombinant flu vaccines    A dose of flu vaccine is recommended every flu season. Children 6 months through 6years of age may need two doses during the same flu season. Everyone else needs only one dose each flu season. Some inactivated flu vaccines contain a very small amount of a mercury-based preservative called thimerosal. Studies have not shown thimerosal in vaccines to be harmful, but flu vaccines that do not contain thimerosal are available. There is no live flu virus in flu shots. They cannot cause the flu. There are many flu viruses, and they are always changing. Each year a new flu vaccine is made to protect against three or four viruses that are likely to cause disease in the upcoming flu season. But even when the vaccine doesnt exactly match these viruses, it may still provide some protection    Flu vaccine cannot prevent:   flu that is caused by a virus not covered by the vaccine, or   illnesses that look like flu but are not. It takes about 2 weeks for protection to develop after vaccination, and protection lasts through the flu season. 3. Some people should not get this vaccine    Tell the person who is giving you the vaccine:     If you have any severe, life-threatening allergies. If you ever had a life-threatening allergic reaction after a dose of flu vaccine, or have a severe allergy to any part of this vaccine, you may be advised not to get vaccinated. Most, but not all, types of flu vaccine contain a small amount of egg protein.  If you ever had Guillain-Barré Syndrome (also called GBS). Some people with a history of GBS should not get this vaccine. This should be discussed with your doctor.  If you are not feeling well. It is usually okay to get flu vaccine when you have a mild illness, but you might be asked to come back when you feel better.       4. Risks of a vaccine reaction    With any medicine, including vaccines, there is a chance of reactions. These are usually mild and go away on their own, but serious reactions are also possible. Most people who get a flu shot do not have any problems with it. Minor problems following a flu shot include:    soreness, redness, or swelling where the shot was given     hoarseness   sore, red or itchy eyes   cough   fever   aches   headache   itching   fatigue  If these problems occur, they usually begin soon after the shot and last 1 or 2 days. More serious problems following a flu shot can include the following:     There may be a small increased risk of Guillain-Barré Syndrome (GBS) after inactivated flu vaccine. This risk has been estimated at 1 or 2 additional cases per million people vaccinated. This is much lower than the risk of severe complications from flu, which can be prevented by flu vaccine.  Young children who get the flu shot along with pneumococcal vaccine (PCV13) and/or DTaP vaccine at the same time might be slightly more likely to have a seizure caused by fever. Ask your doctor for more information. Tell your doctor if a child who is getting flu vaccine has ever had a seizure. Problems that could happen after any injected vaccine:      People sometimes faint after a medical procedure, including vaccination. Sitting or lying down for about 15 minutes can help prevent fainting, and injuries caused by a fall. Tell your doctor if you feel dizzy, or have vision changes or ringing in the ears.  Some people get severe pain in the shoulder and have difficulty moving the arm where a shot was given. This happens very rarely.  Any medication can cause a severe allergic reaction. Such reactions from a vaccine are very rare, estimated at about 1 in a million doses, and would happen within a few minutes to a few hours after the vaccination.     As with any medicine, there is a very remote chance of a vaccine causing a serious injury or death. The safety of vaccines is always being monitored. For more information, visit: www.cdc.gov/vaccinesafety/    5. What if there is a serious reaction? What should I look for?  Look for anything that concerns you, such as signs of a severe allergic reaction, very high fever, or unusual behavior. Signs of a severe allergic reaction can include hives, swelling of the face and throat, difficulty breathing, a fast heartbeat, dizziness, and weakness  usually within a few minutes to a few hours after the vaccination. What should I do?  If you think it is a severe allergic reaction or other emergency that cant wait, call 9-1-1 and get the person to the nearest hospital. Otherwise, call your doctor.  Reactions should be reported to the Vaccine Adverse Event Reporting System (VAERS). Your doctor should file this report, or you can do it yourself through  the VAERS web site at www.vaers. [facility].gov, or by calling 7-605.224.9829. VAERS does not give medical advice. 6. The National Vaccine Injury Compensation Program    The [facility] Vaccine Injury Compensation Program (VICP) is a federal program that was created to compensate people who may have been injured by certain vaccines. Persons who believe they may have been injured by a vaccine can learn about the program and about filing a claim by calling 0-245.616.4856 or visiting the [employer] website at www.[facility].gov/vaccinecompensation. There is a time limit to file a claim for compensation. 7. How can I learn more?  Ask your healthcare provider. He or she can give you the vaccine package insert or suggest other sources of information.  Call your local or state health department.    Contact the Centers for Disease Control and Prevention (CDC):  - Call 6-432.483.4262 (1-800-CDC-INFO) or  - Visit CDCs website at www.cdc.gov/flu    Vaccine Information Statement   Inactivated Influenza Vaccine   8/7/2015  42 MARELY Self [id number]    Department of Health and Human Services  Centers for Disease Control and Prevention    Office Use Only

## 2017-10-25 NOTE — PROGRESS NOTES
Chief Complaint   Patient presents with    Annual Wellness Visit    Hypertension    Cholesterol Problem    Diabetes     Vitals:    10/25/17 0813 10/25/17 0831 10/25/17 0833   BP: (!) 238/82 (!) 210/102  Comment: manual (!) 198/108  Comment: manual   BP 1 Location: Right arm Right arm Left arm   BP Patient Position: Sitting Sitting Sitting   Pulse: (!) 59     Resp: 16     Temp: 96 °F (35.6 °C)     TempSrc: Oral     SpO2: 99%     Weight: 128 lb (58.1 kg)     Height: 4' 10\" (1.473 m)  Comment: measured        Visual Acuity Screening    Right eye Left eye Both eyes   Without correction: 20/30 20/30 20/30   With correction:          Patient in room # 3. Patient is fasting. 1. Have you been to the ER, urgent care clinic since your last visit? Hospitalized since your last visit? No    2. Have you seen or consulted any other health care providers outside of the [facility] since your last visit? Include any pap smears or colon screening. No    HM Reviewed. Flu administered. Flowsheet, ADL, Learning Needs, Abuse and 6cit completed. Verbal order for in office glucose, flu injection and hgba1c per Kushal Byers MD/Manda Caldwell LPN    Patient presents for routine flu injection. She denies any symptoms , reactions or allergies that would exclude them from being immunized today. Risks and adverse reactions were discussed and the VIS was given to them. All questions were addressed. She was observed for 10 min post injection. There were no reactions observed. Chart addendum, to add administration of flu injection.

## 2017-11-03 ENCOUNTER — PATIENT OUTREACH (OUTPATIENT)
Dept: FAMILY MEDICINE CLINIC | Facility: CLINIC | Age: 82
End: 2017-11-03

## 2017-11-03 NOTE — PROGRESS NOTES
Patient discharged from [address] [place] [address] on 11/2/17-11/2/17 for Generalized Weakness, Nausea and Vomiting. Writer returning patient's daughter call. Reached Ms. Brooks on phone. Ms. Fab Shay is listed on patient's PHI. As per patient's daughter patient was recently discharged from Emergency Room yesterday for weakness. Patient's daughter states that they are currently in the Emergency Waiting room and waiting for results. As per patient's daughter patient is still the same from yesterday and has not changed. Patient daughter stated \" She cant hardly stand up today. \"    No further questions, concerns, needs and/or assistance at this time as per patient's daughter. Patient is currently at [facility]. Will follow upon discharge.

## 2017-11-07 NOTE — PROGRESS NOTES
Result reviewed. LPN to call pt with results and put in ergocalciferol 50,000 units qwk x 12 wks with 3 refills. Cholesterol is also elevated. Recommend continuing pravastatin. Can repeat lipid panel in 3 mo.

## 2017-11-08 ENCOUNTER — TELEPHONE (OUTPATIENT)
Dept: FAMILY MEDICINE CLINIC | Facility: CLINIC | Age: 82
End: 2017-11-08

## 2017-11-08 NOTE — TELEPHONE ENCOUNTER
Called home/cell number. Mailbox is full and will not accept message. This call was concerning message below per Dr. Karthik Chaudhary.     ----- Message from Umu Macedo MD sent at 11/7/2017  4:28 PM EST -----  Result reviewed. LPN to call pt with results and put in ergocalciferol 50,000 units qwk x 12 wks with 3 refills. Cholesterol is also elevated. Recommend continuing pravastatin. Can repeat lipid panel in 3 mo.

## 2017-11-09 NOTE — TELEPHONE ENCOUNTER
Called home number. Verified name and . Spoke with patient. Patient notified of message  below per Dr. Dayana Marques. Patient understood the reason for call and had no further questions or concerns.

## 2017-11-10 ENCOUNTER — PATIENT OUTREACH (OUTPATIENT)
Dept: FAMILY MEDICINE CLINIC | Facility: CLINIC | Age: 82
End: 2017-11-10

## 2017-11-10 RX ORDER — CHLORTHALIDONE 25 MG/1
12.5 TABLET ORAL DAILY
COMMUNITY
End: 2018-03-27 | Stop reason: SDUPTHER

## 2017-11-10 RX ORDER — ERGOCALCIFEROL 1.25 MG/1
50000 CAPSULE ORAL
COMMUNITY
End: 2017-11-13 | Stop reason: SDUPTHER

## 2017-11-10 RX ORDER — AMLODIPINE BESYLATE 10 MG/1
TABLET ORAL DAILY
COMMUNITY
End: 2018-04-26 | Stop reason: SDUPTHER

## 2017-11-10 RX ORDER — ATORVASTATIN CALCIUM 80 MG/1
80 TABLET, FILM COATED ORAL
COMMUNITY
End: 2018-11-20 | Stop reason: SDUPTHER

## 2017-11-10 RX ORDER — POTASSIUM CHLORIDE 1.5 G/1.77G
20 POWDER, FOR SOLUTION ORAL EVERY OTHER DAY
COMMUNITY
End: 2017-11-29 | Stop reason: SDUPTHER

## 2017-11-10 RX ORDER — VALSARTAN 80 MG/1
TABLET ORAL DAILY
COMMUNITY
End: 2017-11-15 | Stop reason: DRUGHIGH

## 2017-11-10 NOTE — PROGRESS NOTES
Patient admitted to [facility] on 11/3/17-11/8/17 for Weakness and confusion. Patient discharged to Home on 11/8/17 with Family Support. Patient  Medical History:       Past Medical History:   Diagnosis Date    Advance directive discussed with patient 10/12/2016    Cigarette nicotine dependence in remission 10/12/2016    Dizziness 3/16/2017    Essential hypertension 10/3/2016    Microalbuminuria 1/18/2017    Osteopenia 10/3/2016    Osteoporosis     Pure hypercholesterolemia 1/18/2017    Type II diabetes mellitus ([facility] [address] 10/3/2016    Vitamin D deficiency 1/18/2017     This represents Transitions of Care b/c NN spoke with patient and/or caregiver within 2 business days of discharge. Pt's TCM follow up appt is scheduled with Dr. Dax Valdes on Wednesday  11 /15/17 @ 11:30 am which is within 7 calendar days of discharge. Contacted patient for hospital follow up. Introduced self, role and reason for call. Verified 2 patient identifiers (Name and Date of Birth). Patient reported:  Patient stated \" I'm doing good. \"  Patient states that she is feeling well. Patient states that her Bg is 151 today. Patient states that her BP is normally elevated between Systolic BP of 944Q-425R. Patient states that she is compliant with her BP medications. Patient aware of S/S of hypotension and hypertension. Patient verbalized understanding of when to call PCP and when to go to ED. Patient states that she is independent with ADLs. Patient states that she check her BP and BG everyday. Patient states that she is compliant with all her medications. Patient denied:  Patient denied chest pain, shortness of breath, fever, chills, confusion, difficulty swallowing,  lightheadedness, dizziness, headache,  weakness, slurred speech, nausea, vomiting, diarrhea, and legs swelling/edema. Patient denied problems with bowel and bladder. Medication Reconciliation completed: yes.      New medications at discharge include: as per Dr. Mustapha Nash. START taking these medications     Details   atorvastatin (LIPITOR) 80 mg PO TABS Take 1 Tab by Mouth Every Night at Bedtime. , Disp-30 Tab, R-3, Print       amLODIPine (NORVASC) 10 mg PO TABS Take 1 Tab by Mouth Once a Day. , Disp-30 Tab, R-3, Print       aspirin 81 mg PO CHEW Take 1 Tab by Mouth Once a Day. , Disp-30 Tab, R-3, Print       ergocalciferol (VITAMIN D2) 50,000 unit PO CAPS Take 1 Cap by Mouth Every 7 Days., Disp-6 Cap, R-1, Print       potassium chloride (KLOR-CON) 20 mEq PO PACK Take 1 Packet by Mouth Every Other Day., Disp-15 Packet, R-0, Print       chlorthalidone (HYGROTON) 25 mg PO TABS Take 0.5 Tabs by Mouth Once a Day. , Disp-30 Tab, R-1, Print       Blood-Glucose Meter Misc MISC Please specify brand: One Touch Ultra 2  ICD 10 code : E11 Type 2 Diabetes  Is patient on insulin: No, Disp-1 Each, R-0, Print       Blood Sugar Diagnostic (BLOOD GLUCOSE TEST) Misc STRP Please specify brand:One Touch Ultra Test Strips (use with One Touch Ultra 2 or One Touch UltraMini blood glucose meters). ICD 10 code : E11 Type 2 Diabetes  Is patient on insulin: No, Disp-1 Box, R-3, Print           Changed Medication at Discharge Include: as per Dr. Mustapha Nash.  metFORMIN (GLUCOPHAGE) 500 mg PO TABS Take 1 Tab by Mouth Twice Daily. , Disp-60 Tab, R-2, Print       valsartan (DIOVAN) 80 mg PO TABS         Stop  Medications at discharge include : as per Dr. Mustapha Nash. STOP taking these medications         metoprolol XL (TOPROL XL) 25 mg PO TB24 Comments:   Reason for Stopping:                  Patient reported the following on the patients ADL's:  Feeds self: Independent as per patient   Ambulates: Independent as per patient   Self grooming:Independent as per patient   Toileting: Independent as per patient   Support System consists of: family   Appointments:  11/15/2017 11:30 AM Kushal Byers MD   Patient states that she will self schedule appointment with her Eye Doctor.    Patient aware of appointments. Patient's daughter will provide transportation. Previous Use of Home Health Agency, if so what agency? no     DME   none as per patient. Advance Medical Directive on file in EMR? yes noted on file. Barriers to care  No barriers to care identified at this time. Adherence to previous treatment and likelihood for follow-up:  Patient verbalized understanding of discharge instructions and special follow up. Educated patient to monitor and report the following Red flags: chest pain, shortness of breath, confusion, feeling passing out, difficulty swallowing, weakness,  Fever, chills, S/S of stroke, S/S hypotension and hypertension or any new or concerning symptoms. Advised patient to seek medical attention with patient provider, urgent care or return to the emergency department if any of the following symptoms  occur after being discharged from the hospital:  fever >101.5F,  chest pain, shortness of breath, leg swelling/pain, and/or return of the symptoms which initially resulted in hospitalization. Patient verbalized understanding of information discussed and is aware of  when to seek medical attention from PCP, urgent care or ED. Reconciled home medications and reviewed allergies. Instructed to bring all medications with patient to next appointment. Opportunity to ask questions was provided. Contact information was provided for future reference, assistance, concerns, or further questions. No further  concerns, needs, assistance and questions at this time as per patient.      Plan of Care/Goals:    Patient will attend follow up appointment  Patient will be free from post hospital complications  Discuss importance of diet and exercise with patient. Discharge Procedure Orders as per Dr. Dr. Megan Saldaña. ( taken from [facility] MD office Epic).    Discharge Diet   Diabetic 1800 CHO and 2 Gram Sodium Restriction      Diet as Follows:   Diabetic 1800 CHO and 2 Gram Sodium Restriction    Discharge Activity   Activity as tolerated      Discharge. .. Follow up with PCP   Follow up with Lotus Vazquez MD on 11/15/2017 @11.30 am      Discharge Additional Instructions   Home with family/caregiver support needed      Discharge . .. Follow Up with Specialist   Follow up with an Eye doctor (ophthalmologist) for vision check up   [address]: Other Items   bathing equipment. Length of need = 99 months. Patient kept the conversation short. Patient states that she is ready to get some rest.    Patient verbalized understanding of all information discussed. Patient has this Nurse Navigator contact information for any further questions, concerns, assistance or needs.

## 2017-11-13 RX ORDER — ERGOCALCIFEROL 1.25 MG/1
50000 CAPSULE ORAL
Qty: 12 CAP | Refills: 3 | Status: SHIPPED | OUTPATIENT
Start: 2017-11-13 | End: 2018-05-03

## 2017-11-15 ENCOUNTER — HOSPITAL ENCOUNTER (OUTPATIENT)
Dept: LAB | Age: 82
Discharge: HOME | End: 2017-11-15
Payer: MEDICARE

## 2017-11-15 ENCOUNTER — OFFICE VISIT (OUTPATIENT)
Dept: FAMILY MEDICINE CLINIC | Facility: CLINIC | Age: 82
End: 2017-11-15

## 2017-11-15 VITALS
HEART RATE: 71 BPM | TEMPERATURE: 97.9 F | DIASTOLIC BLOOD PRESSURE: 88 MMHG | WEIGHT: 124.4 LBS | SYSTOLIC BLOOD PRESSURE: 142 MMHG | HEIGHT: 58 IN | BODY MASS INDEX: 26.11 KG/M2 | RESPIRATION RATE: 12 BRPM

## 2017-11-15 DIAGNOSIS — I10 ESSENTIAL HYPERTENSION: ICD-10-CM

## 2017-11-15 DIAGNOSIS — E11.29 CONTROLLED TYPE 2 DIABETES MELLITUS WITH MICROALBUMINURIA, WITHOUT LONG-TERM CURRENT USE OF INSULIN (HCC): ICD-10-CM

## 2017-11-15 DIAGNOSIS — E78.00 PURE HYPERCHOLESTEROLEMIA: ICD-10-CM

## 2017-11-15 DIAGNOSIS — R80.9 CONTROLLED TYPE 2 DIABETES MELLITUS WITH MICROALBUMINURIA, WITHOUT LONG-TERM CURRENT USE OF INSULIN (HCC): ICD-10-CM

## 2017-11-15 DIAGNOSIS — H54.7 VISION PROBLEM: Primary | ICD-10-CM

## 2017-11-15 PROCEDURE — 80053 COMPREHEN METABOLIC PANEL: CPT | Performed by: INTERNAL MEDICINE

## 2017-11-15 PROCEDURE — 36415 COLL VENOUS BLD VENIPUNCTURE: CPT | Performed by: INTERNAL MEDICINE

## 2017-11-15 RX ORDER — VALSARTAN 160 MG/1
160 TABLET ORAL DAILY
Qty: 90 TAB | Refills: 3 | Status: SHIPPED | OUTPATIENT
Start: 2017-11-15 | End: 2018-08-29

## 2017-11-15 RX ORDER — GUAIFENESIN 100 MG/5ML
LIQUID (ML) ORAL
Refills: 3 | COMMUNITY
Start: 2017-11-08 | End: 2018-11-20 | Stop reason: SDUPTHER

## 2017-11-15 RX ORDER — METFORMIN HYDROCHLORIDE 750 MG/1
750 TABLET, EXTENDED RELEASE ORAL DAILY
Qty: 90 TAB | Refills: 3 | Status: SHIPPED | OUTPATIENT
Start: 2017-11-15 | End: 2018-11-20 | Stop reason: SDUPTHER

## 2017-11-15 NOTE — PROGRESS NOTES
Internal Medicine Progress Note    Today's Date:  2017   Patient:  Rebekah Nieves  Patient :  1932    Subjective:     Chief Complaint   Patient presents with    Dizziness     3 months    Hypertension    Diabetes    Cholesterol Problem      Transitional care   Pt was discharged from the hospital on 17. Nurse navigator spoke with the pt on 11/10/17. Pt was seen in the office on 11/15/17. The complexity of this is high. Pt was discharged from the hospital. She had speech difficulty, generalized weakness and disequilibrium. MRI brain was abnormal. Pt was seen in the hospital by the neurologist. Follow up with ophthalmology was recommended. Hypertension   This is a chronic problem. BP is not at goal.  Pt takes losartan, chlorthalidone, norvasc and toprol. Pt reports compliance with these medications. Osteopenia  This is a chronic problem. This is stable. Pt is off fosamax. Pt was on fosamax for two years. Last dexa scan was in . Pt is on calcium/Vitamin D and ergocalciferol for vitamin D deficiency.       Diabetes mellitus  This is a chronic problem. This is controlled. Pt takes metformin. Pt is on aspirin and statin.  +microalbuminuria.      Past Medical History:   Diagnosis Date    Advance directive discussed with patient 10/12/2016    Cigarette nicotine dependence in remission 10/12/2016    Dizziness 3/16/2017    Essential hypertension 10/3/2016    Microalbuminuria 2017    Osteopenia 10/3/2016    Osteoporosis     Pure hypercholesterolemia 2017    Type II diabetes mellitus ([facility] [address] 10/3/2016    Vitamin D deficiency 2017     History reviewed. No pertinent surgical history. reports that she has never smoked. She has never used smokeless tobacco. She reports that she does not drink alcohol or use illicit drugs.   Family History   Problem Relation Age of Onset    Hypertension Mother     Cancer Mother      pancreas    Cancer Father      lung     No Known Allergies  Review of Systems   Positives in bold  CV:      chest pain, palpitations  PULM:  SOB, wheezing, cough, sputum production    Current Outpatient Meds and Allergies     Current Outpatient Prescriptions on File Prior to Visit   Medication Sig Dispense Refill    valsartan-hydroCHLOROthiazide (DIOVAN-HCT) 320-25 mg per tablet Take 1 Tab by mouth daily.  pravastatin (PRAVACHOL) 40 mg tablet Take 40 mg by mouth nightly.  metoprolol succinate (TOPROL-XL) 25 mg XL tablet Take 1 Tab by mouth daily. 90 Tab 3    metFORMIN (GLUCOPHAGE) 500 mg tablet Take 1 Tab by mouth two (2) times daily (with meals). 180 Tab 3    glucose blood VI test strips (BLOOD GLUCOSE TEST) strip Check BS BID. Dx code: E11.65 200 Strip 3    calcium-cholecalciferol, D3, (CALCIUM 600 + D) tablet Take 1 Tab by mouth two (2) times a day. 180 Tab 3    aspirin delayed-release 81 mg tablet Take  by mouth daily. No current facility-administered medications on file prior to visit.       No Known Allergies  Objective:     VS:    Visit Vitals    BP (!) 154/98 (BP 1 Location: Left arm, BP Patient Position: Sitting)  Comment (BP 1 Location): manual    Pulse 62    Temp 96.9 °F (36.1 °C) (Oral)    Resp 20    Ht 4' 11\" (1.499 m)    Wt 128 lb 3.2 oz (58.2 kg)    SpO2 100%    BMI 25.89 kg/m2     General:   Well-nourished, well-groomed, pleasant, alert, in no acute distress  Ears:  External ears WNL, TMs WNL  Eyes:  EOMI, PERRL  Nose:  External nares WNL  Psych:  No pressured speech, no abnormal thought content    Hospital Outpatient Visit on 10/25/2017   Component Date Value Ref Range Status    WBC 10/25/2017 5.4  4.6 - 13.2 K/uL Final    RBC 10/25/2017 3.93* 4.20 - 5.30 M/uL Final    HGB 10/25/2017 12.1  12.0 - 16.0 g/dL Final    HCT 10/25/2017 36.9  35.0 - 45.0 % Final    MCV 10/25/2017 93.9  74.0 - 97.0 FL Final    MCH 10/25/2017 30.8  24.0 - 34.0 PG Final    MCHC 10/25/2017 32.8  31.0 - 37.0 g/dL Final    RDW 10/25/2017 13.5  11.6 - 14.5 % Final    PLATELET 00/72/0300 279  135 - 420 K/uL Final    MPV 10/25/2017 12.3* 9.2 - 11.8 FL Final    NEUTROPHILS 10/25/2017 47  40 - 73 % Final    LYMPHOCYTES 10/25/2017 42  21 - 52 % Final    MONOCYTES 10/25/2017 9  3 - 10 % Final    EOSINOPHILS 10/25/2017 2  0 - 5 % Final    BASOPHILS 10/25/2017 0  0 - 2 % Final    ABS. NEUTROPHILS 10/25/2017 2.5  1.8 - 8.0 K/UL Final    ABS. LYMPHOCYTES 10/25/2017 2.3  0.9 - 3.6 K/UL Final    ABS. MONOCYTES 10/25/2017 0.5  0.05 - 1.2 K/UL Final    ABS. EOSINOPHILS 10/25/2017 0.1  0.0 - 0.4 K/UL Final    ABS. BASOPHILS 10/25/2017 0.0  0.0 - 0.06 K/UL Final    DF 10/25/2017 AUTOMATED    Final    T4, Free 10/25/2017 0.9  0.7 - 1.5 NG/DL Final    LIPID PROFILE 10/25/2017        Final    Cholesterol, total 10/25/2017 279* <200 MG/DL Final    Triglyceride 10/25/2017 108  <150 MG/DL Final    Comment: The drugs N-acetylcysteine (NAC) and  Metamiszole have been found to cause falsely  low results in this chemical assay. Please  be sure to submit blood samples obtained  BEFORE administration of either of these  drugs to assure correct results.       HDL Cholesterol 10/25/2017 68* 40 - 60 MG/DL Final    LDL, calculated 10/25/2017 189.4* 0 - 100 MG/DL Final    VLDL, calculated 10/25/2017 21.6  MG/DL Final    CHOL/HDL Ratio 10/25/2017 4.1  0 - 5.0   Final    Sodium 10/25/2017 142  136 - 145 mmol/L Final    Potassium 10/25/2017 3.5  3.5 - 5.5 mmol/L Final    Chloride 10/25/2017 104  100 - 108 mmol/L Final    CO2 10/25/2017 29  21 - 32 mmol/L Final    Anion gap 10/25/2017 9  3.0 - 18 mmol/L Final    Glucose 10/25/2017 114* 74 - 99 mg/dL Final    BUN 10/25/2017 21* 7.0 - 18 MG/DL Final    Creatinine 10/25/2017 0.75  0.6 - 1.3 MG/DL Final    BUN/Creatinine ratio 10/25/2017 28* 12 - 20   Final    GFR est AA 10/25/2017 >60  >60 ml/min/1.73m2 Final    GFR est non-AA 10/25/2017 >60  >60 ml/min/1.73m2 Final    Comment: (NOTE)  Estimated GFR is calculated using the Modification of Diet in Renal   Disease (MDRD) Study equation, reported for both  Americans   (GFRAA) and non- Americans (GFRNA), and normalized to 1.73m2   body surface area. The physician must decide which value applies to   the patient. The MDRD study equation should only be used in   individuals age 25 or older. It has not been validated for the   following: pregnant women, patients with serious comorbid conditions,   or on certain medications, or persons with extremes of body size,   muscle mass, or nutritional status.  Calcium 10/25/2017 9.1  8.5 - 10.1 MG/DL Final    Bilirubin, total 10/25/2017 0.3  0.2 - 1.0 MG/DL Final    ALT (SGPT) 10/25/2017 21  13 - 56 U/L Final    AST (SGOT) 10/25/2017 11* 15 - 37 U/L Final    Alk. phosphatase 10/25/2017 69  45 - 117 U/L Final    Protein, total 10/25/2017 7.1  6.4 - 8.2 g/dL Final    Albumin 10/25/2017 3.7  3.4 - 5.0 g/dL Final    Globulin 10/25/2017 3.4  2.0 - 4.0 g/dL Final    A-G Ratio 10/25/2017 1.1  0.8 - 1.7   Final    TSH 10/25/2017 2.38  0.36 - 3.74 uIU/mL Final    Vitamin D 25-Hydroxy 10/25/2017 26.5* 30 - 100 ng/mL Final    Comment: (NOTE)  Deficiency               <20 ng/mL  Insufficiency          20-30 ng/mL  Sufficient             ng/mL  Possible toxicity       >100 ng/mL    The Method used is Siemens Advia Centaur currently standardized to a   Center of Disease Control and Prevention (CDC) certified reference   [address]. Samples containing fluorescein dye can produce falsely   elevated values when tested with the ADVIA Centaur Vitamin D Assay. It is recommended that results in the toxic range, >100 ng/mL, be   retested 72 hours post fluorescein exposure.       Microalbumin,urine random 10/25/2017 8.70* 0 - 3.0 MG/DL Final    Creatinine, urine 10/25/2017 76.31  30 - 125 mg/dL Final    Microalbumin/Creat ratio (mg/g cre* 10/25/2017 114* 0 - 30 mg/g Final   Office Visit on 10/25/2017   Component Date Value Ref Range Status    Hemoglobin A1c (POC) 10/25/2017 6.3  % Final    Glucose POC 10/25/2017 126  mg/dL Final     Assessment/Plan & Orders:         ICD-10-CM ICD-9-CM    1. Vision problem H54.7 V41.0 REFERRAL TO OPHTHALMOLOGY   2. Essential hypertension K48 880.9 METABOLIC PANEL, COMPREHENSIVE      METABOLIC PANEL, COMPREHENSIVE      valsartan (DIOVAN) 160 mg tablet   3. Controlled type 2 diabetes mellitus with microalbuminuria, without long-term current use of insulin (HCC) E11.29 250.40 metFORMIN ER (GLUCOPHAGE XR) 750 mg tablet    R80.9 791.0    4. Pure hypercholesterolemia E78.00 272.0 LIPID PANEL      Healthy lifestyle has been encouraged including avoidance of tobacco, limiting or avoiding alcohol intake, heart healthy diet which is low in cholesterol and saturated fat and contains fresh fruits, vegetables and whole grains and fiber, regular exercise with goals of 20-30 minutes 3-5 days weekly and maintaining an optimal BMI. Stop regular metformin   Pt to see neurology. Referral updated for Dr. Kathleen Thompson given on [address]  Pt may benefit from gait training  Depression screenin/3/17    Follow-up Disposition:  Return in about 1 week (around 2017) for Follow up hypertension, Follow up hyperlipidemia, Follow up diabetes mellitus, Go over lab/imaging r. *Patient verbalized understanding and agreement with the plan. Patient was given an after-visit summary. [place].  [address], MD - Internal Medicine  2017, 11:20 AM  [facility]  [address] [place], [address]  Phone [phone]  Fax [phone]

## 2017-11-15 NOTE — PATIENT INSTRUCTIONS
Epley Maneuver at Home for Vertigo: Exercises  Your Care Instructions  Vertigo is a spinning or whirling sensation when you move your head. Your doctor may have moved you in different positions to help your vertigo get better faster. This is called the Epley maneuver. Your doctor also may have asked you to do these exercises at home. Do the exercises as often as your doctor recommends. If your vertigo is getting worse, your doctor may have you change the exercise or stop it. How to do the exercises  Step 1    1. Sit on the edge of a bed or sofa. Step 2    1. Turn your head 45 degrees in the direction your doctor told you to. This may be toward the ear that causes the most vertigo for you. Step 3    1. Tilt yourself backward until you are lying on your back. Your head should still be at a 45-degree turn. Your head should be about midway between looking straight ahead and looking out to your side. Hold for 30 seconds. If you have vertigo, stay in this position until it stops. Step 4    1. Turn your head 90 degrees toward the ear that has the least vertigo. The point of your chin should be over your shoulder. Hold for 30 seconds. Step 5    1. Roll onto the side of the ear with the least vertigo. You should now be looking at the floor. Follow-up care is a key part of your treatment and safety. Be sure to make and go to all appointments, and call your doctor if you are having problems. It's also a good idea to know your test results and keep a list of the medicines you take. Where can you learn more? Go to http://shashank-louie.info/. Enter [id number] in the search box to learn more about \"Epley Maneuver at Home for Vertigo: Exercises. \"  Current as of: October 14, 2016  Content Version: 11.4  © 0685-5911 [employer]wise, [employer]. Care instructions adapted under license by [employer] (which disclaims liability or warranty for this information).  If you have questions about a medical condition or this instruction, always ask your healthcare professional. [place] [address] any warranty or liability for your use of this information.

## 2017-11-15 NOTE — PROGRESS NOTES
Chief Complaint   Patient presents with   [facility] Follow Up     stroke    Hypertension    Diabetes    Cholesterol Problem     Vitals:    11/15/17 1132 11/15/17 1143   BP: 150/64 142/88  Comment: manual   BP 1 Location: Right arm Left arm   BP Patient Position: Sitting Sitting   Pulse: 71    Resp: 12    Temp: 97.9 °F (36.6 °C)    TempSrc: Oral    Weight: 124 lb 6.4 oz (56.4 kg)    Height: 4' 10\" (1.473 m)      Patient in room # 3. Patient is not fasting. 1. Have you been to the ER, urgent care clinic since your last visit? Hospitalized since your last visit? Yes When: 11/03/2017 Where: [address] [place] [address] Reason for visit: Stroke    2. Have you seen or consulted any other health care providers outside of the [address] [place] [address] since your last visit? Include any pap smears or colon screening. No     Reviewed. Patient self checked glucose 155 mg/dl.

## 2017-11-15 NOTE — MR AVS SNAPSHOT
Visit Information Date & Time Provider Department Dept. Phone Encounter #  
 11/15/2017 11:30 AM Bel Gottlieb MD [facility] [phone] [id number] Follow-up Instructions Return in about 1 week (around 11/22/2017) for Follow up hypertension, Follow up hyperlipidemia, Follow up diabetes mellitus, Go over lab/imaging r. Your Appointments 12/27/2017 10:30 AM  
Follow Up with Bel Gottlieb MD  
[facility]) Appt Note: Return in about 3 months (around 12/25/2017). [address] [facility] [id number]  
[address] [place] [address] Upcoming Health Maintenance Date Due HEMOGLOBIN A1C Q6M 4/25/2018 EYE EXAM RETINAL OR DILATED Q1 7/19/2018 FOOT EXAM Q1 10/25/2018 MICROALBUMIN Q1 10/25/2018 LIPID PANEL Q1 10/25/2018 MEDICARE YEARLY EXAM 10/26/2018 GLAUCOMA SCREENING Q2Y 7/19/2019 DTaP/Tdap/Td series (2 - Td) 12/10/2020 Allergies as of 11/15/2017  Review Complete On: 11/15/2017 By: Bel Gottlibe MD  
 No Known Allergies Current Immunizations  Never Reviewed Name Date Influenza Vaccine 10/15/2015, 12/31/2013, 1/2/2013, 11/2/2009 Influenza Vaccine (Quad) PF 10/25/2017  9:00 AM, 10/3/2016 Pneumococcal Vaccine (Unspecified Type) 11/2/2009 Tdap 12/10/2010 Not reviewed this visit You Were Diagnosed With   
  
 Codes Comments Vision problem    -  Primary ICD-10-CM: H54.7 ICD-9-CM: V41.0 Essential hypertension     ICD-10-CM: I10 
ICD-9-CM: 401.9 Controlled type 2 diabetes mellitus with microalbuminuria, without long-term current use of insulin (HCC)     ICD-10-CM: E11.29, R80.9 ICD-9-CM: 250.40, 791.0 Pure hypercholesterolemia     ICD-10-CM: E78.00 ICD-9-CM: 272.0 Vitals BP Pulse Temp Resp Height(growth percentile) Weight(growth percentile) 142/88 (BP 1 Location: Left arm, BP Patient Position: Sitting) 71 97.9 °F (36.6 °C) (Oral) 12 4' 10\" (1.473 m) 124 lb 6.4 oz (56.4 kg) BMI OB Status Smoking Status 26 kg/m2 Menopause Never Smoker Vitals History BMI and BSA Data Body Mass Index Body Surface Area  
 26 kg/m 2 1.52 m 2 Preferred Pharmacy Pharmacy Name Phone [facility]/PHARMACY #[id number]- [address], [address] [place] [address] 29 [phone] Your Updated Medication List  
  
   
This list is accurate as of: 11/15/17 12:23 PM.  Always use your most recent med list. amLODIPine 10 mg tablet Commonly known as:  Heather Colon Take  by mouth daily. aspirin 81 mg chewable tablet CHEW 1 TABLET BY MOUTH ONCE A DAY  
  
 atorvastatin 80 mg tablet Commonly known as:  LIPITOR Take 80 mg by mouth nightly. calcium-cholecalciferol (D3) tablet Commonly known as:  Calcium 600 + D Take 1 Tab by mouth two (2) times a day. chlorthalidone 25 mg tablet Commonly known as:  Ailyn Folk Take 12.5 mg by mouth daily. ergocalciferol 50,000 unit capsule Commonly known as:  ERGOCALCIFEROL Take 1 Cap by mouth every seven (7) days. Indications: Vitamin D Deficiency  
  
 glucose blood VI test strips strip Commonly known as:  blood glucose test  
Check BS BID. Dx code: E11.65  
  
 metFORMIN  mg tablet Commonly known as:  GLUCOPHAGE XR Take 1 Tab by mouth daily. metoprolol succinate 25 mg XL tablet Commonly known as:  TOPROL-XL Take 1 Tab by mouth daily. potassium chloride 20 mEq packet Commonly known as:  KLOR-CON Take 20 mEq by mouth every other day. valsartan 160 mg tablet Commonly known as:  DIOVAN Take 1 Tab by mouth daily. Prescriptions Sent to Pharmacy Refills  
 metFORMIN ER (GLUCOPHAGE XR) 750 mg tablet 3 Sig: Take 1 Tab by mouth daily.   
 Class: Normal  
 Pharmacy: [address] [place] [address], [address] [place] [address], 29  #: [phone] Route: Oral  
 valsartan (DIOVAN) 160 mg tablet 3 Sig: Take 1 Tab by mouth daily. Class: Normal  
 Pharmacy: [address] [place] [address], [address] [place] [address],# [address]  #: [phone] Route: Oral  
  
We Performed the Following METABOLIC PANEL, COMPREHENSIVE [10798 CPT(R)] REFERRAL TO OPHTHALMOLOGY [REF57 Custom] Follow-up Instructions Return in about 1 week (around 11/22/2017) for Follow up hypertension, Follow up hyperlipidemia, Follow up diabetes mellitus, Go over lab/imaging r. To-Do List   
 11/15/2017 Lab:  METABOLIC PANEL, COMPREHENSIVE   
  
 11/18/2017 Lab:  LIPID PANEL Referral Information Referral ID Referred By Referred To  
  
 [id number] Mary Zamora [address] [facility] Phone: [phone] Fax: [phone] Visits Status Start Date End Date 1 New Request 11/15/17 11/15/18 If your referral has a status of pending review or denied, additional information will be sent to support the outcome of this decision. Patient Instructions Epley Maneuver at Home for Vertigo: Exercises Your Care Instructions Vertigo is a spinning or whirling sensation when you move your head. Your doctor may have moved you in different positions to help your vertigo get better faster. This is called the Epley maneuver. Your doctor also may have asked you to do these exercises at home. Do the exercises as often as your doctor recommends. If your vertigo is getting worse, your doctor may have you change the exercise or stop it. How to do the exercises Step 1 1. Sit on the edge of a bed or sofa. Step 2 1. Turn your head 45 degrees in the direction your doctor told you to. This may be toward the ear that causes the most vertigo for you. Step 3 1. Tilt yourself backward until you are lying on your back. Your head should still be at a 45-degree turn. Your head should be about midway between looking straight ahead and looking out to your side. Hold for 30 seconds. If you have vertigo, stay in this position until it stops. Step 4 1. Turn your head 90 degrees toward the ear that has the least vertigo. The point of your chin should be over your shoulder. Hold for 30 seconds. Step 5 1. Roll onto the side of the ear with the least vertigo. You should now be looking at the floor. Follow-up care is a key part of your treatment and safety. Be sure to make and go to all appointments, and call your doctor if you are having problems. It's also a good idea to know your test results and keep a list of the medicines you take. Where can you learn more? Go to http://shashank-louie.info/. Enter 470 [id number] in the search box to learn more about \"Epley Maneuver at Home for Vertigo: Exercises. \" Current as of: October 14, 2016 Content Version: 11.4 © 6317-3565 Healthwise, Incorporated. Care instructions adapted under license by [employer] (which disclaims liability or warranty for this information). If you have questions about a medical condition or this instruction, always ask your healthcare professional. [place] [address] any warranty or liability for your use of this information. Introducing [facility] HE[facility]! Dear Elisha Corea: Thank you for requesting a [employer] account. Our records indicate that you have previously registered for a [employer] account but its currently inactive. Please call our [employer] support line at [phone]. Additional Information If you have questions, please visit the Frequently Asked Questions section of the [employer] website at [email or web address] Remember, [employer] is NOT to be used for urgent needs. For medical emergencies, dial 911. Now available from your iPhone and Android! Please provide this summary of care documentation to your next provider. Your primary care clinician is listed as Alisson Marcos. If you have any questions after today's visit, please call [phone].

## 2017-11-16 LAB
ALBUMIN SERPL-MCNC: 4 G/DL (ref 3.4–5)
ALBUMIN/GLOB SERPL: 1.2 {RATIO} (ref 0.8–1.7)
ALP SERPL-CCNC: 63 U/L (ref 45–117)
ALT SERPL-CCNC: 47 U/L (ref 13–56)
ANION GAP SERPL CALC-SCNC: 10 MMOL/L (ref 3–18)
AST SERPL-CCNC: 17 U/L (ref 15–37)
BILIRUB SERPL-MCNC: 0.2 MG/DL (ref 0.2–1)
BUN SERPL-MCNC: 16 MG/DL (ref 7–18)
BUN/CREAT SERPL: 15 (ref 12–20)
CALCIUM SERPL-MCNC: 9.4 MG/DL (ref 8.5–10.1)
CHLORIDE SERPL-SCNC: 102 MMOL/L (ref 100–108)
CO2 SERPL-SCNC: 25 MMOL/L (ref 21–32)
CREAT SERPL-MCNC: 1.08 MG/DL (ref 0.6–1.3)
GLOBULIN SER CALC-MCNC: 3.3 G/DL (ref 2–4)
GLUCOSE SERPL-MCNC: 82 MG/DL (ref 74–99)
POTASSIUM SERPL-SCNC: 3.9 MMOL/L (ref 3.5–5.5)
PROT SERPL-MCNC: 7.3 G/DL (ref 6.4–8.2)
SODIUM SERPL-SCNC: 137 MMOL/L (ref 136–145)

## 2017-11-18 DIAGNOSIS — E78.00 PURE HYPERCHOLESTEROLEMIA: ICD-10-CM

## 2017-11-20 ENCOUNTER — HOSPITAL ENCOUNTER (OUTPATIENT)
Dept: LAB | Age: 82
Discharge: HOME | End: 2017-11-20
Payer: MEDICARE

## 2017-11-20 ENCOUNTER — OFFICE VISIT (OUTPATIENT)
Dept: FAMILY MEDICINE CLINIC | Facility: CLINIC | Age: 82
End: 2017-11-20

## 2017-11-20 ENCOUNTER — TELEPHONE (OUTPATIENT)
Dept: FAMILY MEDICINE CLINIC | Facility: CLINIC | Age: 82
End: 2017-11-20

## 2017-11-20 VITALS
BODY MASS INDEX: 25.78 KG/M2 | SYSTOLIC BLOOD PRESSURE: 142 MMHG | OXYGEN SATURATION: 93 % | HEART RATE: 68 BPM | RESPIRATION RATE: 12 BRPM | DIASTOLIC BLOOD PRESSURE: 88 MMHG | WEIGHT: 122.8 LBS | HEIGHT: 58 IN | TEMPERATURE: 96.5 F

## 2017-11-20 DIAGNOSIS — I10 ESSENTIAL HYPERTENSION: ICD-10-CM

## 2017-11-20 DIAGNOSIS — E78.00 PURE HYPERCHOLESTEROLEMIA: ICD-10-CM

## 2017-11-20 DIAGNOSIS — I10 ESSENTIAL HYPERTENSION: Primary | ICD-10-CM

## 2017-11-20 DIAGNOSIS — R80.9 CONTROLLED TYPE 2 DIABETES MELLITUS WITH MICROALBUMINURIA, WITHOUT LONG-TERM CURRENT USE OF INSULIN (HCC): ICD-10-CM

## 2017-11-20 DIAGNOSIS — E11.29 CONTROLLED TYPE 2 DIABETES MELLITUS WITH MICROALBUMINURIA, WITHOUT LONG-TERM CURRENT USE OF INSULIN (HCC): ICD-10-CM

## 2017-11-20 LAB
ALBUMIN SERPL-MCNC: 4 G/DL (ref 3.4–5)
ALBUMIN/GLOB SERPL: 1.2 {RATIO} (ref 0.8–1.7)
ALP SERPL-CCNC: 70 U/L (ref 45–117)
ALT SERPL-CCNC: 37 U/L (ref 13–56)
ANION GAP SERPL CALC-SCNC: 9 MMOL/L (ref 3–18)
AST SERPL-CCNC: 19 U/L (ref 15–37)
BILIRUB SERPL-MCNC: 0.4 MG/DL (ref 0.2–1)
BUN SERPL-MCNC: 19 MG/DL (ref 7–18)
BUN/CREAT SERPL: 20 (ref 12–20)
CALCIUM SERPL-MCNC: 8.8 MG/DL (ref 8.5–10.1)
CHLORIDE SERPL-SCNC: 103 MMOL/L (ref 100–108)
CHOLEST SERPL-MCNC: 159 MG/DL
CO2 SERPL-SCNC: 28 MMOL/L (ref 21–32)
CREAT SERPL-MCNC: 0.93 MG/DL (ref 0.6–1.3)
GLOBULIN SER CALC-MCNC: 3.3 G/DL (ref 2–4)
GLUCOSE SERPL-MCNC: 111 MG/DL (ref 74–99)
HDLC SERPL-MCNC: 56 MG/DL (ref 40–60)
HDLC SERPL: 2.8 {RATIO} (ref 0–5)
LDLC SERPL CALC-MCNC: 87.2 MG/DL (ref 0–100)
LIPID PROFILE,FLP: NORMAL
POTASSIUM SERPL-SCNC: 3.6 MMOL/L (ref 3.5–5.5)
PROT SERPL-MCNC: 7.3 G/DL (ref 6.4–8.2)
SODIUM SERPL-SCNC: 140 MMOL/L (ref 136–145)
TRIGL SERPL-MCNC: 79 MG/DL (ref ?–150)
VLDLC SERPL CALC-MCNC: 15.8 MG/DL

## 2017-11-20 PROCEDURE — 80053 COMPREHEN METABOLIC PANEL: CPT | Performed by: INTERNAL MEDICINE

## 2017-11-20 PROCEDURE — 80061 LIPID PANEL: CPT | Performed by: INTERNAL MEDICINE

## 2017-11-20 NOTE — TELEPHONE ENCOUNTER
Spoke with the pt's daughter regarding the appt scheduled on 11/22/17 at 9:25am with Dr. Karlie Spencer office. No further concerns were voiced.

## 2017-11-20 NOTE — PATIENT INSTRUCTIONS

## 2017-11-20 NOTE — MR AVS SNAPSHOT
Visit Information Date & Time Provider Department Dept. Phone Encounter #  
 11/20/2017  8:15 AM Zhane Medel MD [facility] [phone] [id number] Follow-up Instructions Return in about 1 week (around 11/27/2017) for Follow up hypertension, Follow up hyperlipidemia, Follow up diabetes mellitus, Go over lab/imaging r. Your Appointments 12/27/2017 10:30 AM  
Follow Up with Zhane Medel MD  
[facility]) Appt Note: Return in about 3 months (around 12/25/2017). [address] [place] [phone]  
[address] [address] Upcoming Health Maintenance Date Due HEMOGLOBIN A1C Q6M 4/25/2018 EYE EXAM RETINAL OR DILATED Q1 7/19/2018 FOOT EXAM Q1 10/25/2018 MICROALBUMIN Q1 10/25/2018 LIPID PANEL Q1 10/25/2018 MEDICARE YEARLY EXAM 10/26/2018 GLAUCOMA SCREENING Q2Y 7/19/2019 DTaP/Tdap/Td series (2 - Td) 12/10/2020 Allergies as of 11/20/2017  Review Complete On: 11/20/2017 By: Zhane Medel MD  
 No Known Allergies Current Immunizations  Never Reviewed Name Date Influenza Vaccine 10/15/2015, 12/31/2013, 1/2/2013, 11/2/2009 Influenza Vaccine (Quad) PF 10/25/2017  9:00 AM, 10/3/2016 Pneumococcal Vaccine (Unspecified Type) 11/2/2009 Tdap 12/10/2010 Not reviewed this visit You Were Diagnosed With   
  
 Codes Comments Essential hypertension    -  Primary ICD-10-CM: I10 
ICD-9-CM: 401.9 Pure hypercholesterolemia     ICD-10-CM: E78.00 ICD-9-CM: 272.0 Controlled type 2 diabetes mellitus with microalbuminuria, without long-term current use of insulin (HCC)     ICD-10-CM: E11.29, R80.9 ICD-9-CM: 250.40, 791.0 Vitals BP Pulse Temp Resp Height(growth percentile) Weight(growth percentile)  142/88 (BP 1 Location: Left arm, BP Patient Position: Sitting) 68 96.5 °F (35.8 °C) (Oral) 12 4' 10\" (1.473 m) 122 lb 12.8 oz (55.7 kg) SpO2 BMI OB Status Smoking Status 93% 25.67 kg/m2 Menopause Never Smoker Vitals History BMI and BSA Data Body Mass Index Body Surface Area  
 25.67 kg/m 2 1.51 m 2 Preferred Pharmacy Pharmacy Name Phone [facility]/PHARMACY #[id number]- [place], [address] [place] [address],# 29 [phone] Your Updated Medication List  
  
   
This list is accurate as of: 11/20/17  9:02 AM.  Always use your most recent med list. amLODIPine 10 mg tablet Commonly known as:  [place] Take  by mouth daily. aspirin 81 mg chewable tablet CHEW 1 TABLET BY MOUTH ONCE A DAY  
  
 atorvastatin 80 mg tablet Commonly known as:  LIPITOR Take 80 mg by mouth nightly. calcium-cholecalciferol (D3) tablet Commonly known as:  Calcium 600 + D Take 1 Tab by mouth two (2) times a day. chlorthalidone 25 mg tablet Commonly known as:  Valeda Connor Take 12.5 mg by mouth daily. ergocalciferol 50,000 unit capsule Commonly known as:  ERGOCALCIFEROL Take 1 Cap by mouth every seven (7) days. Indications: Vitamin D Deficiency  
  
 glucose blood VI test strips strip Commonly known as:  blood glucose test  
Check BS BID. Dx code: E11.65  
  
 metFORMIN  mg tablet Commonly known as:  GLUCOPHAGE XR Take 1 Tab by mouth daily. metoprolol succinate 25 mg XL tablet Commonly known as:  TOPROL-XL Take 1 Tab by mouth daily. potassium chloride 20 mEq packet Commonly known as:  KLOR-CON Take 20 mEq by mouth every other day. valsartan 160 mg tablet Commonly known as:  DIOVAN Take 1 Tab by mouth daily. Follow-up Instructions Return in about 1 week (around 11/27/2017) for Follow up hypertension, Follow up hyperlipidemia, Follow up diabetes mellitus, Go over lab/imaging r. To-Do List   
 11/20/2017   Lab:  METABOLIC PANEL, COMPREHENSIVE   
  
  
 Patient Instructions Learning About Diabetes Food Guidelines Your Care Instructions Meal planning is important to manage diabetes. It helps keep your blood sugar at a target level (which you set with your doctor). You don't have to eat special foods. You can eat what your family eats, including sweets once in a while. But you do have to pay attention to how often you eat and how much you eat of certain foods. You may want to work with a dietitian or a certified diabetes educator (CDE) to help you plan meals and snacks. A dietitian or CDE can also help you lose weight if that is one of your goals. What should you know about eating carbs? Managing the amount of carbohydrate (carbs) you eat is an important part of healthy meals when you have diabetes. Carbohydrate is found in many foods. · Learn which foods have carbs. And learn the amounts of carbs in different foods. ¨ Bread, cereal, pasta, and rice have about 15 grams of carbs in a serving. A serving is 1 slice of bread (1 ounce), ½ cup of cooked cereal, or 1/3 cup of cooked pasta or rice. ¨ Fruits have 15 grams of carbs in a serving. A serving is 1 small fresh fruit, such as an apple or orange; ½ of a banana; ½ cup of cooked or canned fruit; ½ cup of fruit juice; 1 cup of melon or raspberries; or 2 tablespoons of dried fruit. ¨ Milk and no-sugar-added yogurt have 15 grams of carbs in a serving. A serving is 1 cup of milk or 2/3 cup of no-sugar-added yogurt. ¨ Starchy vegetables have 15 grams of carbs in a serving. A serving is ½ cup of mashed potatoes or sweet potato; 1 cup winter squash; ½ of a small baked potato; ½ cup of cooked beans; or ½ cup cooked corn or green peas. · Learn how much carbs to eat each day and at each meal. A dietitian or CDE can teach you how to keep track of the amount of carbs you eat. This is called carbohydrate counting.  
· If you are not sure how to count carbohydrate grams, use the Plate Method to plan meals. It is a good, quick way to make sure that you have a balanced meal. It also helps you spread carbs throughout the day. ¨ Divide your plate by types of foods. Put non-starchy vegetables on half the plate, meat or other protein food on one-quarter of the plate, and a grain or starchy vegetable in the final quarter of the plate. To this you can add a small piece of fruit and 1 cup of milk or yogurt, depending on how many carbs you are supposed to eat at a meal. 
· Try to eat about the same amount of carbs at each meal. Do not \"save up\" your daily allowance of carbs to eat at one meal. 
· Proteins have very little or no carbs per serving. Examples of proteins are beef, chicken, turkey, fish, eggs, tofu, cheese, cottage cheese, and peanut butter. A serving size of meat is 3 ounces, which is about the size of a deck of cards. Examples of meat substitute serving sizes (equal to 1 ounce of meat) are 1/4 cup of cottage cheese, 1 egg, 1 tablespoon of peanut butter, and ½ cup of tofu. How can you eat out and still eat healthy? · Learn to estimate the serving sizes of foods that have carbohydrate. If you measure food at home, it will be easier to estimate the amount in a serving of restaurant food. · If the meal you order has too much carbohydrate (such as potatoes, corn, or baked beans), ask to have a low-carbohydrate food instead. Ask for a salad or green vegetables. · If you use insulin, check your blood sugar before and after eating out to help you plan how much to eat in the future. · If you eat more carbohydrate at a meal than you had planned, take a walk or do other exercise. This will help lower your blood sugar. What else should you know? · Limit saturated fat, such as the fat from meat and dairy products. This is a healthy choice because people who have diabetes are at higher risk of heart disease.  So choose lean cuts of meat and nonfat or low-fat dairy products. Use olive or canola oil instead of butter or shortening when cooking. · Don't skip meals. Your blood sugar may drop too low if you skip meals and take insulin or certain medicines for diabetes. · Check with your doctor before you drink alcohol. Alcohol can cause your blood sugar to drop too low. Alcohol can also cause a bad reaction if you take certain diabetes medicines. Follow-up care is a key part of your treatment and safety. Be sure to make and go to all appointments, and call your doctor if you are having problems. It's also a good idea to know your test results and keep a list of the medicines you take. Where can you learn more? Go to http://shashank-louie.info/. Enter T856 in the search box to learn more about \"Learning About Diabetes Food Guidelines. \" Current as of: March 13, 2017 Content Version: 11.4 © 0784-2555 [employer]. Care instructions adapted under license by [employer] (which disclaims liability or warranty for this information). If you have questions about a medical condition or this instruction, always ask your healthcare professional. [address] any warranty or liability for your use of this information. Introducing [facility] & HEALTH SERVICES! Dear Lazaro Anderson: Thank you for requesting a [employer] account. Our records indicate that you have previously registered for a [employer] account but its currently inactive. Please call our [employer] support line at [phone]. Additional Information If you have questions, please visit the Frequently Asked Questions section of the [employer] website at [email or web address] Remember, [employer] is NOT to be used for urgent needs. For medical emergencies, dial 911. Now available from your iPhone and Android! Please provide this summary of care documentation to your next provider. Your primary care clinician is listed as Ruben Hamilton. If you have any questions after today's visit, please call [phone].

## 2017-11-20 NOTE — PROGRESS NOTES
Chief Complaint   Patient presents with    Diabetes     1 week follow-up    Hypertension     1 week follow-up    Cholesterol Problem     1 week follow-up        Vitals:    11/20/17 0758 11/20/17 0811   BP: 160/63 142/88  Comment: manual   BP 1 Location: Right arm Left arm   BP Patient Position: Sitting Sitting   Pulse: 68    Resp: 12    Temp: 96.5 °F (35.8 °C)    TempSrc: Oral    SpO2: 93%    Weight: 122 lb 12.8 oz (55.7 kg)    Height: 4' 10\" (1.473 m)        Patient in room # 3. Patient is fasting. 1. Have you been to the ER, urgent care clinic since your last visit? Hospitalized since your last visit? No    2. Have you seen or consulted any other health care providers outside of the [address] [place] [address] since your last visit? Include any pap smears or colon screening. No     Reviewed. Patient self checked blood sugar 123mg/dl.

## 2017-11-20 NOTE — PROGRESS NOTES
Internal Medicine Progress Note    Today's Date:  2017   Patient:  Sebastian John  Patient :  [id number]    Subjective:     Chief Complaint   Patient presents with    Dizziness     3 months    Hypertension    Diabetes    Cholesterol Problem      Hypertension   This is a chronic problem. BP is at goal.  Pt takes valsartan, chlorthalidone with potassium, norvasc and toprol. Pt reports compliance with these medications. +new CKD. Pt states that she is feeling much better. Her strength is back. BP at home are at goal.     Osteopenia  This is a chronic problem. This is stable. Pt is off fosamax. Pt was on fosamax for two years. Last dexa scan was in . Pt is on calcium/Vitamin D and ergocalciferol for vitamin D deficiency.       Diabetes mellitus  This is a chronic problem. This is controlled. Pt takes metformin. Pt is on aspirin and statin.  +microalbuminuria. +HLD     Past Medical History:   Diagnosis Date    Advance directive discussed with patient 10/12/2016    Cigarette nicotine dependence in remission 10/12/2016    Dizziness 3/16/2017    Essential hypertension 10/3/2016    Microalbuminuria 2017    Osteopenia 10/3/2016    Osteoporosis     Pure hypercholesterolemia 2017    Type II diabetes mellitus ([facility] [address] 10/3/2016    Vitamin D deficiency 2017     History reviewed. No pertinent surgical history. reports that she has never smoked. She has never used smokeless tobacco. She reports that she does not drink alcohol or use illicit drugs.   Family History   Problem Relation Age of Onset    Hypertension Mother     Cancer Mother      pancreas    Cancer Father      lung     No Known Allergies  Review of Systems   Positives in bold  CV:      chest pain, palpitations  PULM:  SOB, wheezing, cough, sputum production    Current Outpatient Meds and Allergies     Current Outpatient Prescriptions on File Prior to Visit   Medication Sig Dispense Refill    valsartan-hydroCHLOROthiazide (DIOVAN-HCT) 320-25 mg per tablet Take 1 Tab by mouth daily.  pravastatin (PRAVACHOL) 40 mg tablet Take 40 mg by mouth nightly.  metoprolol succinate (TOPROL-XL) 25 mg XL tablet Take 1 Tab by mouth daily. 90 Tab 3    metFORMIN (GLUCOPHAGE) 500 mg tablet Take 1 Tab by mouth two (2) times daily (with meals). 180 Tab 3    glucose blood VI test strips (BLOOD GLUCOSE TEST) strip Check BS BID. Dx code: E11.65 200 Strip 3    calcium-cholecalciferol, D3, (CALCIUM 600 + D) tablet Take 1 Tab by mouth two (2) times a day. 180 Tab 3    aspirin delayed-release 81 mg tablet Take  by mouth daily. No current facility-administered medications on file prior to visit.       No Known Allergies  Objective:     VS:    Visit Vitals    BP (!) 154/98 (BP 1 Location: Left arm, BP Patient Position: Sitting)  Comment (BP 1 Location): manual    Pulse 62    Temp 96.9 °F (36.1 °C) (Oral)    Resp 20    Ht 4' 11\" (1.499 m)    Wt 128 lb 3.2 oz (58.2 kg)    SpO2 100%    BMI 25.89 kg/m2     General:   Well-nourished, well-groomed, pleasant, alert, in no acute distress  Ears:  External ears WNL, TMs WNL  Eyes:  EOMI, PERRL  Nose:  External nares WNL  Psych:  No pressured speech, no abnormal thought content    Hospital Outpatient Visit on 11/15/2017   Component Date Value Ref Range Status    Sodium 11/15/2017 137  136 - 145 mmol/L Final    Potassium 11/15/2017 3.9  3.5 - 5.5 mmol/L Final    Chloride 11/15/2017 102  100 - 108 mmol/L Final    CO2 11/15/2017 25  21 - 32 mmol/L Final    Anion gap 11/15/2017 10  3.0 - 18 mmol/L Final    Glucose 11/15/2017 82  74 - 99 mg/dL Final    BUN 11/15/2017 16  7.0 - 18 MG/DL Final    Creatinine 11/15/2017 1.08  0.6 - 1.3 MG/DL Final    BUN/Creatinine ratio 11/15/2017 15  12 - 20   Final    GFR est AA 11/15/2017 59* >60 ml/min/1.73m2 Final    GFR est non-AA 11/15/2017 48* >60 ml/min/1.73m2 Final    Comment: (NOTE)  Estimated GFR is calculated using the Modification of Diet in Renal Disease (MDRD) Study equation, reported for both  Americans   (GFRAA) and non- Americans (GFRNA), and normalized to 1.73m2   body surface area. The physician must decide which value applies to   the patient. The MDRD study equation should only be used in   individuals age 25 or older. It has not been validated for the   following: pregnant women, patients with serious comorbid conditions,   or on certain medications, or persons with extremes of body size,   muscle mass, or nutritional status.  Calcium 11/15/2017 9.4  8.5 - 10.1 MG/DL Final    Bilirubin, total 11/15/2017 0.2  0.2 - 1.0 MG/DL Final    ALT (SGPT) 11/15/2017 47  13 - 56 U/L Final    AST (SGOT) 11/15/2017 17  15 - 37 U/L Final    Alk. phosphatase 11/15/2017 63  45 - 117 U/L Final    Protein, total 11/15/2017 7.3  6.4 - 8.2 g/dL Final    Albumin 11/15/2017 4.0  3.4 - 5.0 g/dL Final    Globulin 11/15/2017 3.3  2.0 - 4.0 g/dL Final    A-G Ratio 11/15/2017 1.2  0.8 - 1.7   Final   Hospital Outpatient Visit on 10/25/2017   Component Date Value Ref Range Status    WBC 10/25/2017 5.4  4.6 - 13.2 K/uL Final    RBC 10/25/2017 3.93* 4.20 - 5.30 M/uL Final    HGB 10/25/2017 12.1  12.0 - 16.0 g/dL Final    HCT 10/25/2017 36.9  35.0 - 45.0 % Final    MCV 10/25/2017 93.9  74.0 - 97.0 FL Final    MCH 10/25/2017 30.8  24.0 - 34.0 PG Final    MCHC 10/25/2017 32.8  31.0 - 37.0 g/dL Final    RDW 10/25/2017 13.5  11.6 - 14.5 % Final    PLATELET 65/29/2185 458  135 - 420 K/uL Final    MPV 10/25/2017 12.3* 9.2 - 11.8 FL Final    NEUTROPHILS 10/25/2017 47  40 - 73 % Final    LYMPHOCYTES 10/25/2017 42  21 - 52 % Final    MONOCYTES 10/25/2017 9  3 - 10 % Final    EOSINOPHILS 10/25/2017 2  0 - 5 % Final    BASOPHILS 10/25/2017 0  0 - 2 % Final    ABS. NEUTROPHILS 10/25/2017 2.5  1.8 - 8.0 K/UL Final    ABS. LYMPHOCYTES 10/25/2017 2.3  0.9 - 3.6 K/UL Final    ABS. MONOCYTES 10/25/2017 0.5  0.05 - 1.2 K/UL Final    ABS.  EOSINOPHILS 10/25/2017 0.1  0.0 - 0.4 K/UL Final    ABS. BASOPHILS 10/25/2017 0.0  0.0 - 0.06 K/UL Final    DF 10/25/2017 AUTOMATED    Final    T4, Free 10/25/2017 0.9  0.7 - 1.5 NG/DL Final    LIPID PROFILE 10/25/2017        Final    Cholesterol, total 10/25/2017 279* <200 MG/DL Final    Triglyceride 10/25/2017 108  <150 MG/DL Final    Comment: The drugs N-acetylcysteine (NAC) and  Metamiszole have been found to cause falsely  low results in this chemical assay. Please  be sure to submit blood samples obtained  BEFORE administration of either of these  drugs to assure correct results.  HDL Cholesterol 10/25/2017 68* 40 - 60 MG/DL Final    LDL, calculated 10/25/2017 189.4* 0 - 100 MG/DL Final    VLDL, calculated 10/25/2017 21.6  MG/DL Final    CHOL/HDL Ratio 10/25/2017 4.1  0 - 5.0   Final    Sodium 10/25/2017 142  136 - 145 mmol/L Final    Potassium 10/25/2017 3.5  3.5 - 5.5 mmol/L Final    Chloride 10/25/2017 104  100 - 108 mmol/L Final    CO2 10/25/2017 29  21 - 32 mmol/L Final    Anion gap 10/25/2017 9  3.0 - 18 mmol/L Final    Glucose 10/25/2017 114* 74 - 99 mg/dL Final    BUN 10/25/2017 21* 7.0 - 18 MG/DL Final    Creatinine 10/25/2017 0.75  0.6 - 1.3 MG/DL Final    BUN/Creatinine ratio 10/25/2017 28* 12 - 20   Final    GFR est AA 10/25/2017 >60  >60 ml/min/1.73m2 Final    GFR est non-AA 10/25/2017 >60  >60 ml/min/1.73m2 Final    Comment: (NOTE)  Estimated GFR is calculated using the Modification of Diet in Renal   Disease (MDRD) Study equation, reported for both  Americans   (GFRAA) and non- Americans (GFRNA), and normalized to 1.73m2   body surface area. The physician must decide which value applies to   the patient. The MDRD study equation should only be used in   individuals age 25 or older.  It has not been validated for the   following: pregnant women, patients with serious comorbid conditions,   or on certain medications, or persons with extremes of body size,   muscle mass, or nutritional status.  Calcium 10/25/2017 9.1  8.5 - 10.1 MG/DL Final    Bilirubin, total 10/25/2017 0.3  0.2 - 1.0 MG/DL Final    ALT (SGPT) 10/25/2017 21  13 - 56 U/L Final    AST (SGOT) 10/25/2017 11* 15 - 37 U/L Final    Alk. phosphatase 10/25/2017 69  45 - 117 U/L Final    Protein, total 10/25/2017 7.1  6.4 - 8.2 g/dL Final    Albumin 10/25/2017 3.7  3.4 - 5.0 g/dL Final    Globulin 10/25/2017 3.4  2.0 - 4.0 g/dL Final    A-G Ratio 10/25/2017 1.1  0.8 - 1.7   Final    TSH 10/25/2017 2.38  0.36 - 3.74 uIU/mL Final    Vitamin D 25-Hydroxy 10/25/2017 26.5* 30 - 100 ng/mL Final    Comment: (NOTE)  Deficiency               <20 ng/mL  Insufficiency          20-30 ng/mL  Sufficient             ng/mL  Possible toxicity       >100 ng/mL    The Method used is Siemens Advia Centaur currently standardized to a   Center of Disease Control and Prevention (CDC) certified reference   [address]. Samples containing fluorescein dye can produce falsely   elevated values when tested with the ADVIA Centaur Vitamin D Assay. It is recommended that results in the toxic range, >100 ng/mL, be   retested 72 hours post fluorescein exposure.  Microalbumin,urine random 10/25/2017 8.70* 0 - 3.0 MG/DL Final    Creatinine, urine 10/25/2017 76.31  30 - 125 mg/dL Final    Microalbumin/Creat ratio (mg/g cre* 10/25/2017 114* 0 - 30 mg/g Final   Office Visit on 10/25/2017   Component Date Value Ref Range Status    Hemoglobin A1c (POC) 10/25/2017 6.3  % Final    Glucose POC 10/25/2017 126  mg/dL Final     Assessment/Plan & Orders:         ICD-10-CM ICD-9-CM    1. Essential hypertension M63 862.9 METABOLIC PANEL, COMPREHENSIVE   2. Pure hypercholesterolemia E78.00 272.0    3.  Controlled type 2 diabetes mellitus with microalbuminuria, without long-term current use of insulin (HCC) E11.29 250.40     R80.9 791.0       Healthy lifestyle has been encouraged including avoidance of tobacco, limiting or avoiding alcohol intake, heart healthy diet which is low in cholesterol and saturated fat and contains fresh fruits, vegetables and whole grains and fiber, regular exercise with goals of 20-30 minutes 3-5 days weekly and maintaining an optimal BMI. Eye appt on Wednesday  Depression screenin/3/17    Follow-up Disposition:  Return in about 1 month (around 2017) for Follow up hypertension, Follow up hyperlipidemia, Follow up diabetes mellitus, Go over lab/imaging r. *Patient verbalized understanding and agreement with the plan. Patient was given an after-visit summary. Camron Jeffries MD - Internal Medicine  2017, 11:20 AM  [facility]  [address] [place], [address]  Phone [phone]  Fax [phone]

## 2017-12-27 ENCOUNTER — OFFICE VISIT (OUTPATIENT)
Dept: FAMILY MEDICINE CLINIC | Facility: CLINIC | Age: 82
End: 2017-12-27

## 2017-12-27 VITALS
OXYGEN SATURATION: 94 % | HEART RATE: 67 BPM | RESPIRATION RATE: 12 BRPM | WEIGHT: 127.8 LBS | TEMPERATURE: 98.3 F | HEIGHT: 58 IN | BODY MASS INDEX: 26.83 KG/M2 | SYSTOLIC BLOOD PRESSURE: 154 MMHG | DIASTOLIC BLOOD PRESSURE: 82 MMHG

## 2017-12-27 DIAGNOSIS — I10 ESSENTIAL HYPERTENSION: Primary | ICD-10-CM

## 2017-12-27 DIAGNOSIS — E11.29 CONTROLLED TYPE 2 DIABETES MELLITUS WITH MICROALBUMINURIA, WITHOUT LONG-TERM CURRENT USE OF INSULIN (HCC): ICD-10-CM

## 2017-12-27 DIAGNOSIS — E78.00 PURE HYPERCHOLESTEROLEMIA: ICD-10-CM

## 2017-12-27 DIAGNOSIS — R80.9 CONTROLLED TYPE 2 DIABETES MELLITUS WITH MICROALBUMINURIA, WITHOUT LONG-TERM CURRENT USE OF INSULIN (HCC): ICD-10-CM

## 2017-12-27 DIAGNOSIS — E55.9 VITAMIN D DEFICIENCY: ICD-10-CM

## 2017-12-27 NOTE — MR AVS SNAPSHOT
Visit Information Date & Time Provider Department Dept. Phone Encounter #  
 12/27/2017 10:30 AM Rowdy Bustos MD [facility] [phone] [id number] Follow-up Instructions Return in about 3 months (around 3/27/2018) for Follow up hypertension. Upcoming Health Maintenance Date Due HEMOGLOBIN A1C Q6M 4/25/2018 FOOT EXAM Q1 10/25/2018 MICROALBUMIN Q1 10/25/2018 MEDICARE YEARLY EXAM 10/26/2018 LIPID PANEL Q1 11/20/2018 EYE EXAM RETINAL OR DILATED Q1 11/22/2018 GLAUCOMA SCREENING Q2Y 11/22/2019 DTaP/Tdap/Td series (2 - Td) 12/10/2020 Allergies as of 12/27/2017  Review Complete On: 12/27/2017 By: Rowdy Bustos MD  
 No Known Allergies Current Immunizations  Never Reviewed Name Date Influenza Vaccine 10/15/2015, 12/31/2013, 1/2/2013, 11/2/2009 Influenza Vaccine (Quad) PF 10/25/2017  9:00 AM, 10/3/2016 Pneumococcal Vaccine (Unspecified Type) 11/2/2009 Tdap 12/10/2010 Not reviewed this visit You Were Diagnosed With   
  
 Codes Comments Essential hypertension    -  Primary ICD-10-CM: I10 
ICD-9-CM: 401.9 Controlled type 2 diabetes mellitus with microalbuminuria, without long-term current use of insulin (HCC)     ICD-10-CM: E11.29, R80.9 ICD-9-CM: 250.40, 791.0 Pure hypercholesterolemia     ICD-10-CM: E78.00 ICD-9-CM: 272.0 Vitamin D deficiency     ICD-10-CM: E55.9 ICD-9-CM: 268.9 Vitals BP Pulse Temp Resp Height(growth percentile) Weight(growth percentile) 154/82 (BP 1 Location: Left arm, BP Patient Position: Sitting) 67 98.3 °F (36.8 °C) (Oral) 12 4' 10\" (1.473 m) 127 lb 12.8 oz (58 kg) SpO2 BMI OB Status Smoking Status 94% 26.71 kg/m2 Menopause Never Smoker Vitals History BMI and BSA Data Body Mass Index Body Surface Area  
 26.71 kg/m 2 1.54 m 2 Preferred Pharmacy Pharmacy Name Phone [id number], [phone] Your Updated Medication List  
  
   
This list is accurate as of: 12/27/17 11:05 AM.  Always use your most recent med list. amLODIPine 10 mg tablet Commonly known as:  Darien Alstrom Take  by mouth daily. aspirin 81 mg chewable tablet CHEW 1 TABLET BY MOUTH ONCE A DAY  
  
 atorvastatin 80 mg tablet Commonly known as:  LIPITOR Take 80 mg by mouth nightly. calcium-cholecalciferol (D3) tablet Commonly known as:  Calcium 600 + D Take 1 Tab by mouth two (2) times a day. chlorthalidone 25 mg tablet Commonly known as:  Roetta Magic Take 12.5 mg by mouth daily. ergocalciferol 50,000 unit capsule Commonly known as:  ERGOCALCIFEROL Take 1 Cap by mouth every seven (7) days. Indications: Vitamin D Deficiency  
  
 glucose blood VI test strips strip Commonly known as:  blood glucose test  
Check BS BID. Dx code: E11.65  
  
 metFORMIN  mg tablet Commonly known as:  GLUCOPHAGE XR Take 1 Tab by mouth daily. metoprolol succinate 25 mg XL tablet Commonly known as:  TOPROL-XL Take 1 Tab by mouth daily. potassium chloride 20 mEq packet Commonly known as:  KLOR-CON Take 1 Packet by mouth every other day. valsartan 160 mg tablet Commonly known as:  DIOVAN Take 1 Tab by mouth daily. Follow-up Instructions Return in about 3 months (around 3/27/2018) for Follow up hypertension. Patient Instructions Diabetes Foot Health: Care Instructions Your Care Instructions When you have diabetes, your feet need extra care and attention. Diabetes can damage the nerve endings and blood vessels in your feet, making you less likely to notice when your feet are injured. Diabetes also limits your body's ability to fight infection and get blood to areas that need it.  If you get a minor foot injury, it could become an ulcer or a serious infection. With good foot care, you can prevent most of these problems. Caring for your feet can be quick and easy. Most of the care can be done when you are bathing or getting ready for bed. Follow-up care is a key part of your treatment and safety. Be sure to make and go to all appointments, and call your doctor if you are having problems. It's also a good idea to know your test results and keep a list of the medicines you take. How can you care for yourself at home? · Keep your blood sugar close to normal by watching what and how much you eat, monitoring blood sugar, taking medicines if prescribed, and getting regular exercise. · Do not smoke. Smoking affects blood flow and can make foot problems worse. If you need help quitting, talk to your doctor about stop-smoking programs and medicines. These can increase your chances of quitting for good. · Eat a diet that is low in fats. High fat intake can cause fat to build up in your blood vessels and decrease blood flow. · Inspect your feet daily for blisters, cuts, cracks, or sores. If you cannot see well, use a mirror or have someone help you. · Take care of your feet: 
[facility] your feet every day. Use warm (not hot) water. Check the water temperature with your wrists or other part of your body, not your feet. ¨ Dry your feet well. Pat them dry. Do not rub the skin on your feet too hard. Dry well between your toes. If the skin on your feet stays moist, bacteria or a fungus can grow, which can lead to infection. ¨ Keep your skin soft. Use moisturizing skin cream to keep the skin on your feet soft and prevent calluses and cracks. But do not put the cream between your toes, and stop using any cream that causes a rash. ¨ Clean underneath your toenails carefully. Do not use a sharp object to clean underneath your toenails. Use the blunt end of a nail file or other rounded tool. ¨ Trim and file your toenails straight across to prevent ingrown toenails. Use a nail clipper, not scissors. Use an emery board to smooth the edges. · Change socks daily. Socks without seams are best, because seams often rub the feet. You can find socks for people with diabetes from specialty catalogs. · Look inside your shoes every day for things like gravel or torn linings, which could cause blisters or sores. · Buy shoes that fit well: 
¨ Look for shoes that have plenty of space around the toes. This helps prevent bunions and blisters. ¨ Try on shoes while wearing the kind of socks you will usually wear with the shoes. ¨ Avoid plastic shoes. They may rub your feet and cause blisters. Good shoes should be made of materials that are flexible and breathable, such as leather or cloth. ¨ Break in new shoes slowly by wearing them for no more than an hour a day for several days. Take extra time to check your feet for red areas, blisters, or other problems after you wear new shoes. · Do not go barefoot. Do not wear sandals, and do not wear shoes with very thin soles. Thin soles are easy to puncture. They also do not protect your feet from hot pavement or cold weather. · Have your doctor check your feet during each visit. If you have a foot problem, see your doctor. Do not try to treat an early foot problem at home. Home remedies or treatments that you can buy without a prescription (such as corn removers) can be harmful. · Always get early treatment for foot problems. A minor irritation can lead to a major problem if not properly cared for early. When should you call for help? Call your doctor now or seek immediate medical care if: 
? · You have a foot sore, an ulcer or break in the skin that is not healing after 4 days, bleeding corns or calluses, or an ingrown toenail. ? · You have blue or black areas, which can mean bruising or blood flow problems. ? · You have peeling skin or tiny blisters between your toes or cracking or oozing of the skin. ? · You have a fever for more than 24 hours and a foot sore. ? · You have new numbness or tingling in your feet that does not go away after you move your feet or change positions. ? · You have unexplained or unusual swelling of the foot or ankle. ? Watch closely for changes in your health, and be sure to contact your doctor if: 
? · You cannot do proper foot care. Where can you learn more? Go to http://shashank-louei.info/. Enter A739 in the search box to learn more about \"Diabetes Foot Health: Care Instructions. \" Current as of: March 13, 2017 Content Version: 11.4 © 9147-5737 [employer]. Care instructions adapted under license by [employer] (which disclaims liability or warranty for this information). If you have questions about a medical condition or this instruction, always ask your healthcare professional. [address] any warranty or liability for your use of this information. Introducing [facility]! Dear Jose Maria Bansal: Thank you for requesting a Vouch account. Our records indicate that you have previously registered for a Vouch account but its currently inactive. Please call our Vouch support line at [phone]. Additional Information If you have questions, please visit the Frequently Asked Questions section of the Vouch website at [email or web address] Remember, Vouch is NOT to be used for urgent needs. For medical emergencies, dial 911. Now available from your iPhone and Android! Please provide this summary of care documentation to your next provider. Your primary care clinician is listed as Derian Herman. If you have any questions after today's visit, please call [phone].

## 2017-12-27 NOTE — PROGRESS NOTES
Chief Complaint   Patient presents with    Diabetes    Hypertension    Cholesterol Problem     Vitals:    12/27/17 1038 12/27/17 1045   BP: 152/80  Comment: manual 154/82  Comment: manual   BP 1 Location: Right arm Left arm   BP Patient Position: Sitting Sitting   Pulse: 67    Resp: 12    Temp: 98.3 °F (36.8 °C)    TempSrc: Oral    SpO2: 94%    Weight: 127 lb 12.8 oz (58 kg)    Height: 4' 10\" (1.473 m)      Patient is not fasting. Patient in room # 2.     1. Have you been to the ER, urgent care clinic since your last visit? Hospitalized since your last visit? No    2. Have you seen or consulted any other health care providers outside of the [address] [place] [address] since your last visit? Include any pap smears or colon screening. No     Reviewed. Patient self checked blood sugar 140 mg/dl.

## 2017-12-27 NOTE — PROGRESS NOTES
Internal Medicine Progress Note    Today's Date:  2017   Patient:  Mukesh Matt  Patient :  1932    Subjective:     Chief Complaint   Patient presents with    Dizziness     3 months    Hypertension    Diabetes    Cholesterol Problem      Hypertension   This is a chronic problem. BP is not at goal in the office. Pt takes valsartan, chlorthalidone with potassium, norvasc and toprol. Pt reports compliance with these medications. Pt checks BP at home and they are at goal.     Osteopenia  This is a chronic problem. This is stable. Pt is off fosamax. Pt was on fosamax for two years. Last dexa scan was in . Pt is on calcium/Vitamin D and ergocalciferol for vitamin D deficiency.       Diabetes mellitus  This is a chronic problem. This is controlled. Pt takes metformin. Pt is on aspirin and statin.  +microalbuminuria on an ARB.    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 No pertinent surgical history. reports that she has never smoked. She has never used smokeless tobacco. She reports that she does not drink alcohol or use illicit drugs.   Family History   Problem Relation Age of Onset    Hypertension Mother     Cancer Mother      pancreas    Cancer Father      lung     No Known Allergies  Review of Systems   Positives in bold  CV:      chest pain, palpitations  PULM:  SOB, wheezing, cough, sputum production    Current Outpatient Meds and Allergies     Current Outpatient Prescriptions on File Prior to Visit   Medication Sig Dispense Refill    valsartan-hydroCHLOROthiazide (DIOVAN-HCT) 320-25 mg per tablet Take 1 Tab by mouth daily.  pravastatin (PRAVACHOL) 40 mg tablet Take 40 mg by mouth nightly.  metoprolol succinate (TOPROL-XL) 25 mg XL tablet Take 1 Tab by mouth daily. 90 Tab 3    metFORMIN (GLUCOPHAGE) 500 mg tablet Take 1 Tab by mouth two (2) times daily (with meals). 180 Tab 3    glucose blood VI test strips (BLOOD GLUCOSE TEST) strip Check BS BID. Dx code: E11.65 200 Strip 3    calcium-cholecalciferol, D3, (CALCIUM 600 + D) tablet Take 1 Tab by mouth two (2) times a day. 180 Tab 3    aspirin delayed-release 81 mg tablet Take  by mouth daily. No current facility-administered medications on file prior to visit.       No Known Allergies  Objective:     VS:    Visit Vitals    BP (!) 154/98 (BP 1 Location: Left arm, BP Patient Position: Sitting)  Comment (BP 1 Location): manual    Pulse 62    Temp 96.9 °F (36.1 °C) (Oral)    Resp 20    Ht 4' 11\" (1.499 m)    Wt 128 lb 3.2 oz (58.2 kg)    SpO2 100%    BMI 25.89 kg/m2     General:   Well-nourished, well-groomed, pleasant, alert, in no acute distress  Ears:  External ears WNL, TMs WNL  Eyes:  EOMI, PERRL  Nose:  External nares WNL  Psych:  No pressured speech, no abnormal thought content    Hospital Outpatient Visit on 11/20/2017   Component Date Value Ref Range Status    Sodium 11/20/2017 140  136 - 145 mmol/L Final    Potassium 11/20/2017 3.6  3.5 - 5.5 mmol/L Final    Chloride 11/20/2017 103  100 - 108 mmol/L Final    CO2 11/20/2017 28  21 - 32 mmol/L Final    Anion gap 11/20/2017 9  3.0 - 18 mmol/L Final    Glucose 11/20/2017 111* 74 - 99 mg/dL Final    BUN 11/20/2017 19* 7.0 - 18 MG/DL Final    Creatinine 11/20/2017 0.93  0.6 - 1.3 MG/DL Final    BUN/Creatinine ratio 11/20/2017 20  12 - 20   Final    GFR est AA 11/20/2017 >60  >60 ml/min/1.73m2 Final    GFR est non-AA 11/20/2017 57* >60 ml/min/1.73m2 Final    Comment: (NOTE)  Estimated GFR is calculated using the Modification of Diet in Renal   Disease (MDRD) Study equation, reported for both  Americans   (GFRAA) and non- Americans (GFRNA), and normalized to 1.73m2   body surface area. The physician must decide which value applies to   the patient. The MDRD study equation should only be used in   individuals age 25 or older. It has not been validated for the   following: pregnant women, patients with serious comorbid conditions,   or on certain medications, or persons with extremes of body size,   muscle mass, or nutritional status.  Calcium 11/20/2017 8.8  8.5 - 10.1 MG/DL Final    Bilirubin, total 11/20/2017 0.4  0.2 - 1.0 MG/DL Final    ALT (SGPT) 11/20/2017 37  13 - 56 U/L Final    AST (SGOT) 11/20/2017 19  15 - 37 U/L Final    Alk. phosphatase 11/20/2017 70  45 - 117 U/L Final    Protein, total 11/20/2017 7.3  6.4 - 8.2 g/dL Final    Albumin 11/20/2017 4.0  3.4 - 5.0 g/dL Final    Globulin 11/20/2017 3.3  2.0 - 4.0 g/dL Final    A-G Ratio 11/20/2017 1.2  0.8 - 1.7   Final    LIPID PROFILE 11/20/2017        Final    Cholesterol, total 11/20/2017 159  <200 MG/DL Final    Triglyceride 11/20/2017 79  <150 MG/DL Final    Comment: The drugs N-acetylcysteine (NAC) and  Metamiszole have been found to cause falsely  low results in this chemical assay. Please  be sure to submit blood samples obtained  BEFORE administration of either of these  drugs to assure correct results.       HDL Cholesterol 11/20/2017 56  40 - 60 MG/DL Final    LDL, calculated 11/20/2017 87.2  0 - 100 MG/DL Final    VLDL, calculated 11/20/2017 15.8  MG/DL Final    CHOL/HDL Ratio 11/20/2017 2.8  0 - 5.0   Final   Hospital Outpatient Visit on 11/15/2017   Component Date Value Ref Range Status    Sodium 11/15/2017 137  136 - 145 mmol/L Final    Potassium 11/15/2017 3.9  3.5 - 5.5 mmol/L Final    Chloride 11/15/2017 102  100 - 108 mmol/L Final    CO2 11/15/2017 25  21 - 32 mmol/L Final    Anion gap 11/15/2017 10  3.0 - 18 mmol/L Final    Glucose 11/15/2017 82  74 - 99 mg/dL Final    BUN 11/15/2017 16  7.0 - 18 MG/DL Final    Creatinine 11/15/2017 1.08  0.6 - 1.3 MG/DL Final    BUN/Creatinine ratio 11/15/2017 15  12 - 20   Final    GFR est AA 11/15/2017 59* >60 ml/min/1.73m2 Final    GFR est non-AA 11/15/2017 48* >60 ml/min/1.73m2 Final    Comment: (NOTE)  Estimated GFR is calculated using the Modification of Diet in Renal   Disease (MDRD) Study equation, reported for both  Americans   (GFRAA) and non- Americans (GFRNA), and normalized to 1.73m2   body surface area. The physician must decide which value applies to   the patient. The MDRD study equation should only be used in   individuals age 25 or older. It has not been validated for the   following: pregnant women, patients with serious comorbid conditions,   or on certain medications, or persons with extremes of body size,   muscle mass, or nutritional status.  Calcium 11/15/2017 9.4  8.5 - 10.1 MG/DL Final    Bilirubin, total 11/15/2017 0.2  0.2 - 1.0 MG/DL Final    ALT (SGPT) 11/15/2017 47  13 - 56 U/L Final    AST (SGOT) 11/15/2017 17  15 - 37 U/L Final    Alk.  phosphatase 11/15/2017 63  45 - 117 U/L Final    Protein, total 11/15/2017 7.3  6.4 - 8.2 g/dL Final    Albumin 11/15/2017 4.0  3.4 - 5.0 g/dL Final    Globulin 11/15/2017 3.3  2.0 - 4.0 g/dL Final    A-G Ratio 11/15/2017 1.2  0.8 - 1.7   Final   Hospital Outpatient Visit on 10/25/2017   Component Date Value Ref Range Status    WBC 10/25/2017 5.4  4.6 - 13.2 K/uL Final    RBC 10/25/2017 3.93* 4.20 - 5.30 M/uL Final    HGB 10/25/2017 12.1  12.0 - 16.0 g/dL Final    HCT 10/25/2017 36.9  35.0 - 45.0 % Final    MCV 10/25/2017 93.9  74.0 - 97.0 FL Final    MCH 10/25/2017 30.8  24.0 - 34.0 PG Final    MCHC 10/25/2017 32.8  31.0 - 37.0 g/dL Final    RDW 10/25/2017 13.5  11.6 - 14.5 % Final    PLATELET 31/97/9348 590  135 - 420 K/uL Final    MPV 10/25/2017 12.3* 9.2 - 11.8 FL Final    NEUTROPHILS 10/25/2017 47  40 - 73 % Final    LYMPHOCYTES 10/25/2017 42  21 - 52 % Final    MONOCYTES 10/25/2017 9  3 - 10 % Final    EOSINOPHILS 10/25/2017 2  0 - 5 % Final    BASOPHILS 10/25/2017 0  0 - 2 % Final    ABS. NEUTROPHILS 10/25/2017 2.5  1.8 - 8.0 K/UL Final    ABS. LYMPHOCYTES 10/25/2017 2.3  0.9 - 3.6 K/UL Final    ABS. MONOCYTES 10/25/2017 0.5  0.05 - 1.2 K/UL Final    ABS. EOSINOPHILS 10/25/2017 0.1  0.0 - 0.4 K/UL Final    ABS. BASOPHILS 10/25/2017 0.0  0.0 - 0.06 K/UL Final    DF 10/25/2017 AUTOMATED    Final    T4, Free 10/25/2017 0.9  0.7 - 1.5 NG/DL Final    LIPID PROFILE 10/25/2017        Final    Cholesterol, total 10/25/2017 279* <200 MG/DL Final    Triglyceride 10/25/2017 108  <150 MG/DL Final    Comment: The drugs N-acetylcysteine (NAC) and  Metamiszole have been found to cause falsely  low results in this chemical assay. Please  be sure to submit blood samples obtained  BEFORE administration of either of these  drugs to assure correct results.       HDL Cholesterol 10/25/2017 68* 40 - 60 MG/DL Final    LDL, calculated 10/25/2017 189.4* 0 - 100 MG/DL Final    VLDL, calculated 10/25/2017 21.6  MG/DL Final    CHOL/HDL Ratio 10/25/2017 4.1  0 - 5.0   Final    Sodium 10/25/2017 142  136 - 145 mmol/L Final    Potassium 10/25/2017 3.5  3.5 - 5.5 mmol/L Final    Chloride 10/25/2017 104  100 - 108 mmol/L Final    CO2 10/25/2017 29  21 - 32 mmol/L Final    Anion gap 10/25/2017 9  3.0 - 18 mmol/L Final    Glucose 10/25/2017 114* 74 - 99 mg/dL Final    BUN 10/25/2017 21* 7.0 - 18 MG/DL Final    Creatinine 10/25/2017 0.75  0.6 - 1.3 MG/DL Final    BUN/Creatinine ratio 10/25/2017 28* 12 - 20   Final    GFR est AA 10/25/2017 >60  >60 ml/min/1.73m2 Final    GFR est non-AA 10/25/2017 >60  >60 ml/min/1.73m2 Final    Comment: (NOTE)  Estimated GFR is calculated using the Modification of Diet in Renal   Disease (MDRD) Study equation, reported for both  Americans   (GFRAA) and non- Americans (GFRNA), and normalized to 1.73m2 body surface area. The physician must decide which value applies to   the patient. The MDRD study equation should only be used in   individuals age 25 or older. It has not been validated for the   following: pregnant women, patients with serious comorbid conditions,   or on certain medications, or persons with extremes of body size,   muscle mass, or nutritional status.  Calcium 10/25/2017 9.1  8.5 - 10.1 MG/DL Final    Bilirubin, total 10/25/2017 0.3  0.2 - 1.0 MG/DL Final    ALT (SGPT) 10/25/2017 21  13 - 56 U/L Final    AST (SGOT) 10/25/2017 11* 15 - 37 U/L Final    Alk. phosphatase 10/25/2017 69  45 - 117 U/L Final    Protein, total 10/25/2017 7.1  6.4 - 8.2 g/dL Final    Albumin 10/25/2017 3.7  3.4 - 5.0 g/dL Final    Globulin 10/25/2017 3.4  2.0 - 4.0 g/dL Final    A-G Ratio 10/25/2017 1.1  0.8 - 1.7   Final    TSH 10/25/2017 2.38  0.36 - 3.74 uIU/mL Final    Vitamin D 25-Hydroxy 10/25/2017 26.5* 30 - 100 ng/mL Final    Comment: (NOTE)  Deficiency               <20 ng/mL  Insufficiency          20-30 ng/mL  Sufficient             ng/mL  Possible toxicity       >100 ng/mL    The Method used is Siemens Advia Centaur currently standardized to a   Center of Disease Control and Prevention (CDC) certified reference   [address]. Samples containing fluorescein dye can produce falsely   elevated values when tested with the ADVIA Centaur Vitamin D Assay. It is recommended that results in the toxic range, >100 ng/mL, be   retested 72 hours post fluorescein exposure.  Microalbumin,urine random 10/25/2017 8.70* 0 - 3.0 MG/DL Final    Creatinine, urine 10/25/2017 76.31  30 - 125 mg/dL Final    Microalbumin/Creat ratio (mg/g cre* 10/25/2017 114* 0 - 30 mg/g Final   Office Visit on 10/25/2017   Component Date Value Ref Range Status    Hemoglobin A1c (POC) 10/25/2017 6.3  % Final    Glucose POC 10/25/2017 126  mg/dL Final     Assessment/Plan & Orders:         ICD-10-CM ICD-9-CM    1.  Essential hypertension I10 401.9    2. Controlled type 2 diabetes mellitus with microalbuminuria, without long-term current use of insulin (HCC) E11.29 250.40     R80.9 791.0    3. Pure hypercholesterolemia E78.00 272.0    4. Vitamin D deficiency E55.9 268.9       Healthy lifestyle has been encouraged including avoidance of tobacco, limiting or avoiding alcohol intake, heart healthy diet which is low in cholesterol and saturated fat and contains fresh fruits, vegetables and whole grains and fiber, regular exercise with goals of 20-30 minutes 3-5 days weekly and maintaining an optimal BMI. Continue checking BP at home. Call us if not at goal.  Depression screenin/3/17    Follow-up Disposition:  Return in about 3 months (around 3/27/2018) for Follow up hypertension. *Patient verbalized understanding and agreement with the plan. Patient was given an after-visit summary. Vikram Sena.  [address], MD - Internal Medicine  2017, 11:20 AM  [facility]  [facility] [place]oxanne Abdi, [address]  Phone [phone]  Fax [phone]

## 2017-12-27 NOTE — PATIENT INSTRUCTIONS
Diabetes Foot Health: Care Instructions  Your Care Instructions    When you have diabetes, your feet need extra care and attention. Diabetes can damage the nerve endings and blood vessels in your feet, making you less likely to notice when your feet are injured. Diabetes also limits your body's ability to fight infection and get blood to areas that need it. If you get a minor foot injury, it could become an ulcer or a serious infection. With good foot care, you can prevent most of these problems. Caring for your feet can be quick and easy. Most of the care can be done when you are bathing or getting ready for bed. Follow-up care is a key part of your treatment and safety. Be sure to make and go to all appointments, and call your doctor if you are having problems. It's also a good idea to know your test results and keep a list of the medicines you take. How can you care for yourself at home? · Keep your blood sugar close to normal by watching what and how much you eat, monitoring blood sugar, taking medicines if prescribed, and getting regular exercise. · Do not smoke. Smoking affects blood flow and can make foot problems worse. If you need help quitting, talk to your doctor about stop-smoking programs and medicines. These can increase your chances of quitting for good. · Eat a diet that is low in fats. High fat intake can cause fat to build up in your blood vessels and decrease blood flow. · Inspect your feet daily for blisters, cuts, cracks, or sores. If you cannot see well, use a mirror or have someone help you. · Take care of your feet:  [facility] your feet every day. Use warm (not hot) water. Check the water temperature with your wrists or other part of your body, not your feet. ¨ Dry your feet well. Pat them dry. Do not rub the skin on your feet too hard. Dry well between your toes. If the skin on your feet stays moist, bacteria or a fungus can grow, which can lead to infection.   ¨ Keep your skin soft. Use moisturizing skin cream to keep the skin on your feet soft and prevent calluses and cracks. But do not put the cream between your toes, and stop using any cream that causes a rash. ¨ Clean underneath your toenails carefully. Do not use a sharp object to clean underneath your toenails. Use the blunt end of a nail file or other rounded tool. ¨ Trim and file your toenails straight across to prevent ingrown toenails. Use a nail clipper, not scissors. Use an emery board to smooth the edges. · Change socks daily. Socks without seams are best, because seams often rub the feet. You can find socks for people with diabetes from specialty catalogs. · Look inside your shoes every day for things like gravel or torn linings, which could cause blisters or sores. · Buy shoes that fit well:  ¨ Look for shoes that have plenty of space around the toes. This helps prevent bunions and blisters. ¨ Try on shoes while wearing the kind of socks you will usually wear with the shoes. ¨ Avoid plastic shoes. They may rub your feet and cause blisters. Good shoes should be made of materials that are flexible and breathable, such as leather or cloth. ¨ Break in new shoes slowly by wearing them for no more than an hour a day for several days. Take extra time to check your feet for red areas, blisters, or other problems after you wear new shoes. · Do not go barefoot. Do not wear sandals, and do not wear shoes with very thin soles. Thin soles are easy to puncture. They also do not protect your feet from hot pavement or cold weather. · Have your doctor check your feet during each visit. If you have a foot problem, see your doctor. Do not try to treat an early foot problem at home. Home remedies or treatments that you can buy without a prescription (such as corn removers) can be harmful. · Always get early treatment for foot problems. A minor irritation can lead to a major problem if not properly cared for early.   When should you call for help? Call your doctor now or seek immediate medical care if:  ? · You have a foot sore, an ulcer or break in the skin that is not healing after 4 days, bleeding corns or calluses, or an ingrown toenail. ? · You have blue or black areas, which can mean bruising or blood flow problems. ? · You have peeling skin or tiny blisters between your toes or cracking or oozing of the skin. ? · You have a fever for more than 24 hours and a foot sore. ? · You have new numbness or tingling in your feet that does not go away after you move your feet or change positions. ? · You have unexplained or unusual swelling of the foot or ankle. ? Watch closely for changes in your health, and be sure to contact your doctor if:  ? · You cannot do proper foot care. Where can you learn more? Go to http://shashank-louie.info/. Enter A739 in the search box to learn more about \"Diabetes Foot Health: Care Instructions. \"  Current as of: March 13, 2017  Content Version: 11.4  © 2299-6292 [employer]. Care instructions adapted under license by [employer] (which disclaims liability or warranty for this information). If you have questions about a medical condition or this instruction, always ask your healthcare professional. [address] any warranty or liability for your use of this information.

## 2018-03-27 ENCOUNTER — HOSPITAL ENCOUNTER (OUTPATIENT)
Dept: LAB | Age: 83
Discharge: HOME | End: 2018-03-27
Payer: MEDICARE

## 2018-03-27 ENCOUNTER — PATIENT OUTREACH (OUTPATIENT)
Dept: FAMILY MEDICINE CLINIC | Facility: CLINIC | Age: 83
End: 2018-03-27

## 2018-03-27 ENCOUNTER — OFFICE VISIT (OUTPATIENT)
Dept: FAMILY MEDICINE CLINIC | Facility: CLINIC | Age: 83
End: 2018-03-27

## 2018-03-27 VITALS
SYSTOLIC BLOOD PRESSURE: 220 MMHG | TEMPERATURE: 97 F | RESPIRATION RATE: 14 BRPM | WEIGHT: 134 LBS | OXYGEN SATURATION: 94 % | DIASTOLIC BLOOD PRESSURE: 110 MMHG | BODY MASS INDEX: 28.13 KG/M2 | HEART RATE: 56 BPM | HEIGHT: 58 IN

## 2018-03-27 DIAGNOSIS — I10 ESSENTIAL HYPERTENSION: ICD-10-CM

## 2018-03-27 DIAGNOSIS — E55.9 VITAMIN D DEFICIENCY: ICD-10-CM

## 2018-03-27 DIAGNOSIS — E11.29 CONTROLLED TYPE 2 DIABETES MELLITUS WITH MICROALBUMINURIA, WITHOUT LONG-TERM CURRENT USE OF INSULIN (HCC): ICD-10-CM

## 2018-03-27 DIAGNOSIS — I10 ESSENTIAL HYPERTENSION: Primary | ICD-10-CM

## 2018-03-27 DIAGNOSIS — I10 WHITE COAT SYNDROME WITH HYPERTENSION: ICD-10-CM

## 2018-03-27 DIAGNOSIS — R80.9 CONTROLLED TYPE 2 DIABETES MELLITUS WITH MICROALBUMINURIA, WITHOUT LONG-TERM CURRENT USE OF INSULIN (HCC): ICD-10-CM

## 2018-03-27 PROBLEM — E78.00 PURE HYPERCHOLESTEROLEMIA: Status: RESOLVED | Noted: 2017-01-18 | Resolved: 2018-03-27

## 2018-03-27 LAB
ANION GAP SERPL CALC-SCNC: 5 MMOL/L (ref 3–18)
BUN SERPL-MCNC: 18 MG/DL (ref 7–18)
BUN/CREAT SERPL: 21 (ref 12–20)
CALCIUM SERPL-MCNC: 8.7 MG/DL (ref 8.5–10.1)
CHLORIDE SERPL-SCNC: 106 MMOL/L (ref 100–108)
CO2 SERPL-SCNC: 31 MMOL/L (ref 21–32)
CREAT SERPL-MCNC: 0.85 MG/DL (ref 0.6–1.3)
GLUCOSE POC: 153 MG/DL
GLUCOSE SERPL-MCNC: 109 MG/DL (ref 74–99)
HBA1C MFR BLD HPLC: 7 %
POTASSIUM SERPL-SCNC: 4.3 MMOL/L (ref 3.5–5.5)
SODIUM SERPL-SCNC: 142 MMOL/L (ref 136–145)

## 2018-03-27 PROCEDURE — 36415 COLL VENOUS BLD VENIPUNCTURE: CPT | Performed by: INTERNAL MEDICINE

## 2018-03-27 PROCEDURE — 80048 BASIC METABOLIC PNL TOTAL CA: CPT | Performed by: INTERNAL MEDICINE

## 2018-03-27 RX ORDER — ATENOLOL 50 MG/1
TABLET ORAL
Refills: 3 | COMMUNITY
Start: 2018-02-20 | End: 2018-03-27

## 2018-03-27 NOTE — PROGRESS NOTES
Ernesto Workman is a 80 y.o.  female presents today for office visit for follow up for hypertension and medication evaulation. Pt is not fasting. Pt is in Room # 2.      1. Have you been to the ER, urgent care clinic since your last visit? Hospitalized since your last visit? No    2. Have you seen or consulted any other health care providers outside of the [address] [place] [address] since your last visit? Include any pap smears or colon screening. No    Health Maintenance reviewed. Requested Prescriptions      No prescriptions requested or ordered in this encounter     There were no vitals taken for this visit. Upcoming Appts  none    VORB:   Orders Placed This Encounter    AMB POC HEMOGLOBIN A1C    AMB POC GLUCOSE, QUANTITATIVE, BLOOD   /Maribel Clay MD/Yoana Toney , JULIANN    H9H today is 7.0. BS today in office was 153mg/dL.

## 2018-03-27 NOTE — PROGRESS NOTES
Patient admitted to [facility] on 11/3/17-11/8/17 for Weakness and confusion. Patient discharged to Home on 11/8/17 with Family Support. I met the patient in  the office today 3/27/18. Patient's states that she is well. Patient looks well. No S/S of acute distress noted on patient at this time. Noted BP elevated. Patient denied chest pain, shortness of breath, headache, lightheadedness dizziness and weakness. Strongly advised patient to check BP at home call the office if patient's BP is outside the range Dr. Landon duque gave her. Patient verbalized understanding. S/S of high BP and low BP reviewed with patient. Patient is aware when to call PCP and go to ED or urgent care. No concerns, assistance and/or questions verbalized by patient  at this time. Noted no hospitalization  admission post 30 days from discharge date 11/8/17. This episode is closed. Post Hospitalization Encounter Resolved. Office contact information was provided for future reference, assistance, concerns, or further questions.

## 2018-03-27 NOTE — MR AVS SNAPSHOT
[address] [place] [address] 83 [id number] 
[phone] Patient: Nery Desir MRN: [id number] ANDREINA:[birth year] Visit Information Date & Time Provider Department Dept. Phone Encounter #  
 3/27/2018 11:30 AM Que Giraldo MD [facility] [phone] [id number] Follow-up Instructions Return in about 3 months (around 6/27/2018) for Follow up hypertension, Follow up diabetes mellitus, Go over lab/imaging results. Upcoming Health Maintenance Date Due HEMOGLOBIN A1C Q6M 9/27/2018 FOOT EXAM Q1 10/25/2018 MICROALBUMIN Q1 10/25/2018 MEDICARE YEARLY EXAM 10/26/2018 LIPID PANEL Q1 11/20/2018 EYE EXAM RETINAL OR DILATED Q1 11/22/2018 GLAUCOMA SCREENING Q2Y 11/22/2019 DTaP/Tdap/Td series (2 - Td) 12/10/2020 Allergies as of 3/27/2018  Review Complete On: 3/27/2018 By: Que Giraldo MD  
 No Known Allergies Current Immunizations  Never Reviewed Name Date Influenza Vaccine 10/15/2015, 12/31/2013, 1/2/2013, 11/2/2009 Influenza Vaccine (Quad) PF 10/25/2017  9:00 AM, 10/3/2016 Pneumococcal Polysaccharide (PPSV-23) 11/1/2016 12:00 AM  
 Pneumococcal Vaccine (Unspecified Type) 11/2/2009 Tdap 12/10/2010 Not reviewed this visit You Were Diagnosed With   
  
 Codes Comments Essential hypertension    -  Primary ICD-10-CM: I10 
ICD-9-CM: 401.9 White coat syndrome with hypertension     ICD-10-CM: I10 
ICD-9-CM: 401.9 Controlled type 2 diabetes mellitus with microalbuminuria, without long-term current use of insulin (HCC)     ICD-10-CM: E11.29, R80.9 ICD-9-CM: 250.40, 791.0 Vitamin D deficiency     ICD-10-CM: E55.9 ICD-9-CM: 268.9 Vitals BP Pulse Temp Resp Height(growth percentile) Weight(growth percentile) (!) 220/110 (BP 1 Location: Right arm, BP Patient Position: Sitting) (!) 56 97 °F (36.1 °C) (Oral) 14 4' 10\" (1.473 m) 134 lb (60.8 kg) SpO2 BMI OB Status Smoking Status 94% 28.01 kg/m2 Menopause Never Smoker Vitals History BMI and BSA Data Body Mass Index Body Surface Area 28.01 kg/m 2 1.58 m 2 Preferred Pharmacy Pharmacy Name Phone [facility]/PHARMACY #[id number] [phone], [facility] ,[address] [phone] Your Updated Medication List  
  
   
This list is accurate as of 3/27/18 12:03 PM.  Always use your most recent med list. amLODIPine 10 mg tablet Commonly known as:  Silverio Free Take  by mouth daily. aspirin 81 mg chewable tablet CHEW 1 TABLET BY MOUTH ONCE A DAY  
  
 atorvastatin 80 mg tablet Commonly known as:  LIPITOR Take 80 mg by mouth nightly. calcium-cholecalciferol (D3) tablet Commonly known as:  Calcium 600 + D Take 1 Tab by mouth two (2) times a day. chlorthalidone 25 mg tablet Commonly known as:  Kelley Edouard Take 12.5 mg by mouth daily. ergocalciferol 50,000 unit capsule Commonly known as:  ERGOCALCIFEROL Take 1 Cap by mouth every seven (7) days. Indications: Vitamin D Deficiency  
  
 glucose blood VI test strips strip Commonly known as:  blood glucose test  
Check BS BID. Dx code: E11.65  
  
 metFORMIN  mg tablet Commonly known as:  GLUCOPHAGE XR Take 1 Tab by mouth daily. metoprolol succinate 25 mg XL tablet Commonly known as:  TOPROL-XL Take 1 Tab by mouth daily. potassium chloride 20 mEq packet Commonly known as:  KLOR-CON Take 1 Packet by mouth every other day. valsartan 160 mg tablet Commonly known as:  DIOVAN Take 1 Tab by mouth daily. We Performed the Following AMB POC GLUCOSE, QUANTITATIVE, BLOOD [35046 CPT(R)] AMB POC HEMOGLOBIN A1C [79379 CPT(R)] Follow-up Instructions Return in about 3 months (around 6/27/2018) for Follow up hypertension, Follow up diabetes mellitus, Go over lab/imaging results. To-Do List   
 03/27/2018 Lab: METABOLIC PANEL, BASIC Introducing [facility]! Ivanna Chang introduces [facility] patient portal. Now you can access parts of your medical record, email your doctor's office, and request medication refills online. 1. In your internet browser, go to [email or web address] 2. Click on the First Time User? Click Here link in the Sign In box. You will see the New Member Sign Up page. 3. Enter your [facility] Access Code exactly as it appears below. You will not need to use this code after youve completed the sign-up process. If you do not sign up before the expiration date, you must request a new code. · [facility] [facility] Access Code: [email or web address] Expires: 6/25/2018 10:42 AM 
 
4. Enter the last four digits of your Social Security Number (xxxx) and Date of Birth (mm/dd/yyyy) as indicated and click Submit. You will be taken to the next sign-up page. 5. Create a [facility] ID. This will be your [facility] login ID and cannot be changed, so think of one that is secure and easy to remember. 6. Create a [facility] password. You can change your password at any time. 7. Enter your Password Reset Question and Answer. This can be used at a later time if you forget your password. 8. Enter your e-mail address. You will receive e-mail notification when new information is available in [address] 9. Click Sign Up. You can now view and download portions of your medical record. 10. Click the Download Summary menu link to download a portable copy of your medical information. If you have questions, please visit the Frequently Asked Questions section of the [facility] [facility] website. Remember, [facility] is NOT to be used for urgent needs. For medical emergencies, dial 911. Now available from your iPhone and Android! Please provide this summary of care documentation to your next provider. Your primary care clinician is listed as Fam Jeronimo.  If you have any questions after today's visit, please call [phone].

## 2018-03-27 NOTE — PATIENT INSTRUCTIONS

## 2018-03-27 NOTE — PROGRESS NOTES
Internal Medicine Progress Note    Today's Date:  2017   Patient:  Fazal Imus  Patient :  1932    Subjective:     Chief Complaint   Patient presents with    Hypertension    Medication Evaluation    Diabetes    Cholesterol Problem    Vitamin D Deficiency     Hypertension   This is a chronic problem. BP is not at goal in the office. Pt is supposed to be on valsartan, chlorthalidone with potassium, norvasc and toprol. Instead pt is taking valsartan, norvasc, toprol and atenlol. Pt checks BP at home and they are at goal.     Osteopenia  This is a chronic problem. This is stable. Pt is off fosamax. Pt was on fosamax for two years. Last dexa scan was in . Pt is on calcium/Vitamin D and ergocalciferol for vitamin D deficiency.       Diabetes mellitus  This is a chronic problem. This is controlled. Pt takes metformin. Pt is on aspirin and statin.  +microalbuminuria on an ARB.    Past Medical History:   Diagnosis Date    Advance directive discussed with patient 10/12/2016    Cigarette nicotine dependence in remission 10/12/2016    Dizziness 3/16/2017    Essential hypertension 10/3/2016    Microalbuminuria 2017    Osteopenia 10/3/2016    Osteoporosis     Pure hypercholesterolemia 2017    Type II diabetes mellitus ([facility] [address] 10/3/2016    Vitamin D deficiency 2017     History reviewed. No pertinent surgical history. reports that she has never smoked. She has never used smokeless tobacco. She reports that she does not drink alcohol or use illicit drugs.   Family History   Problem Relation Age of Onset    Hypertension Mother     Cancer Mother      pancreas    Cancer Father      lung     No Known Allergies  Review of Systems   Positives in bold  CV:      chest pain, palpitations  PULM:  SOB, wheezing, cough, sputum production    Current Outpatient Meds and Allergies     Current Outpatient Prescriptions on File Prior to Visit   Medication Sig Dispense Refill    valsartan-hydroCHLOROthiazide (DIOVAN-HCT) 320-25 mg per tablet Take 1 Tab by mouth daily.  pravastatin (PRAVACHOL) 40 mg tablet Take 40 mg by mouth nightly.  metoprolol succinate (TOPROL-XL) 25 mg XL tablet Take 1 Tab by mouth daily. 90 Tab 3    metFORMIN (GLUCOPHAGE) 500 mg tablet Take 1 Tab by mouth two (2) times daily (with meals). 180 Tab 3    glucose blood VI test strips (BLOOD GLUCOSE TEST) strip Check BS BID. Dx code: E11.65 200 Strip 3    calcium-cholecalciferol, D3, (CALCIUM 600 + D) tablet Take 1 Tab by mouth two (2) times a day. 180 Tab 3    aspirin delayed-release 81 mg tablet Take  by mouth daily. No current facility-administered medications on file prior to visit. No Known Allergies  Objective:     VS:    Visit Vitals    BP (!) 154/98 (BP 1 Location: Left arm, BP Patient Position: Sitting)  Comment (BP 1 Location): manual    Pulse 62    Temp 96.9 °F (36.1 °C) (Oral)    Resp 20    Ht 4' 11\" (1.499 m)    Wt 128 lb 3.2 oz (58.2 kg)    SpO2 100%    BMI 25.89 kg/m2     General:   Well-nourished, well-groomed, pleasant, alert, in no acute distress  Ears:  External ears WNL, TMs WNL  Eyes:  EOMI, PERRL  Nose:  External nares WNL  Psych:  No pressured speech, no abnormal thought content    PHQ over the last two weeks 7/12/2017   PHQ Not Done -   Little interest or pleasure in doing things Not at all   Feeling down, depressed or hopeless Not at all   Total Score PHQ 2 0     Office Visit on 03/27/2018   Component Date Value Ref Range Status    Hemoglobin A1c (POC) 03/27/2018 7.0  % Final    Glucose POC 03/27/2018 153  mg/dL Final     Assessment/Plan & Orders:         ICD-10-CM ICD-9-CM    1. Essential hypertension W10 134.8 METABOLIC PANEL, BASIC   2. White coat syndrome with hypertension I10 401.9    3.  Controlled type 2 diabetes mellitus with microalbuminuria, without long-term current use of insulin (HCC) E11.29 250.40 AMB POC HEMOGLOBIN A1C    R80.9 791.0 AMB POC GLUCOSE, QUANTITATIVE, BLOOD   4. Vitamin D deficiency E55.9 268.9       Healthy lifestyle has been encouraged including avoidance of tobacco, limiting or avoiding alcohol intake, heart healthy diet which is low in cholesterol and saturated fat and contains fresh fruits, vegetables and whole grains and fiber, regular exercise with goals of 20-30 minutes 3-5 days weekly and maintaining an optimal BMI. Continue checking BP at home. Call us if not at goal of 150/90  Depression screenin/3/17    Follow-up Disposition:  Return in about 3 months (around 2018) for Follow up hypertension, Follow up diabetes mellitus, Go over lab/imaging results. *Patient verbalized understanding and agreement with the plan. Patient was given an after-visit summary. Yenni Lr.  [facility] [address] [facility], MD - Internal Medicine  2017, 11:20 AM  [facility]  [address] [place], [address]  Phone [phone]  Fax [phone]

## 2018-04-26 ENCOUNTER — OFFICE VISIT (OUTPATIENT)
Dept: FAMILY MEDICINE CLINIC | Facility: CLINIC | Age: 83
End: 2018-04-26

## 2018-04-26 VITALS
WEIGHT: 134 LBS | BODY MASS INDEX: 28.13 KG/M2 | DIASTOLIC BLOOD PRESSURE: 80 MMHG | TEMPERATURE: 98.3 F | HEART RATE: 58 BPM | HEIGHT: 58 IN | RESPIRATION RATE: 16 BRPM | OXYGEN SATURATION: 100 % | SYSTOLIC BLOOD PRESSURE: 200 MMHG

## 2018-04-26 DIAGNOSIS — I10 WHITE COAT SYNDROME WITH HYPERTENSION: ICD-10-CM

## 2018-04-26 DIAGNOSIS — I10 ESSENTIAL HYPERTENSION: Primary | ICD-10-CM

## 2018-04-26 DIAGNOSIS — R80.9 CONTROLLED TYPE 2 DIABETES MELLITUS WITH MICROALBUMINURIA, WITHOUT LONG-TERM CURRENT USE OF INSULIN (HCC): ICD-10-CM

## 2018-04-26 DIAGNOSIS — E55.9 VITAMIN D DEFICIENCY: ICD-10-CM

## 2018-04-26 DIAGNOSIS — E11.29 CONTROLLED TYPE 2 DIABETES MELLITUS WITH MICROALBUMINURIA, WITHOUT LONG-TERM CURRENT USE OF INSULIN (HCC): ICD-10-CM

## 2018-04-26 NOTE — PROGRESS NOTES
Kalie Reynoso is 80 y.o. female presents today for office visit for follow up for dizziness, hypertension, diabetes and hyperlipidemia. Pt is not fasting. Pt is in Room# 3.     1. Have you been to the ER, urgent care clinic since your last visit? Hospitalized since your last visit? No    2. Have you seen or consulted any other health care providers outside of the [facility] since your last visit? Include any pap smears or colon screening. No    Health Maintenance reviewed.       Upcoming Appts  none    Requested Prescriptions      No prescriptions requested or ordered in this encounter       Visit Vitals    /80 (BP 1 Location: Left arm, BP Patient Position: Sitting)    Pulse (!) 58    Temp 98.3 °F (36.8 °C) (Oral)    Resp 16    Ht 4' 10\" (1.473 m)    Wt 134 lb (60.8 kg)    SpO2 100%    BMI 28.01 kg/m2

## 2018-04-26 NOTE — PROGRESS NOTES
Internal Medicine Progress Note    Today's Date:  2017   Patient:  Michael Brown  Patient :  1932    Subjective:     Chief Complaint   Patient presents with    Hypertension    Cholesterol Problem    Diabetes    Dizziness     Hypertension   This is a chronic problem. BP is not at goal in the office. Pt is supposed to be on valsartan, chlorthalidone with potassium, norvasc and toprol. Pt reports compliance with this medication. Osteopenia  This is a chronic problem. This is stable. Pt is off fosamax. Pt was on fosamax for two years. Last dexa scan was in . Pt is on calcium/Vitamin D and ergocalciferol for vitamin D deficiency.       Diabetes mellitus  This is a chronic problem. This is controlled. Pt takes metformin. Pt is on aspirin and statin.  +microalbuminuria on an ARB.    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 No pertinent surgical history. reports that she has never smoked. She has never used smokeless tobacco. She reports that she does not drink alcohol or use illicit drugs. Family History   Problem Relation Age of Onset    Hypertension Mother     Cancer Mother      pancreas    Cancer Father      lung     No Known Allergies  Review of Systems   Positives in bold  CV:      chest pain, palpitations  PULM:  SOB, wheezing, cough, sputum production    Current Outpatient Meds and Allergies     Current Outpatient Prescriptions on File Prior to Visit   Medication Sig Dispense Refill    valsartan-hydroCHLOROthiazide (DIOVAN-HCT) 320-25 mg per tablet Take 1 Tab by mouth daily.       pravastatin (PRAVACHOL) 40 mg tablet Take 40 mg by mouth nightly.  metoprolol succinate (TOPROL-XL) 25 mg XL tablet Take 1 Tab by mouth daily. 90 Tab 3    metFORMIN (GLUCOPHAGE) 500 mg tablet Take 1 Tab by mouth two (2) times daily (with meals). 180 Tab 3    glucose blood VI test strips (BLOOD GLUCOSE TEST) strip Check BS BID. Dx code: E11.65 200 Strip 3    calcium-cholecalciferol, D3, (CALCIUM 600 + D) tablet Take 1 Tab by mouth two (2) times a day. 180 Tab 3    aspirin delayed-release 81 mg tablet Take  by mouth daily. No current facility-administered medications on file prior to visit.       No Known Allergies  Objective:     VS:    Visit Vitals    BP (!) 154/98 (BP 1 Location: Left arm, BP Patient Position: Sitting)  Comment (BP 1 Location): manual    Pulse 62    Temp 96.9 °F (36.1 °C) (Oral)    Resp 20    Ht 4' 11\" (1.499 m)    Wt 128 lb 3.2 oz (58.2 kg)    SpO2 100%    BMI 25.89 kg/m2     General:   Well-nourished, well-groomed, pleasant, alert, in no acute distress  Ears:  External ears WNL, TMs WNL  Eyes:  EOMI, PERRL  Nose:  External nares WNL  Psych:  No pressured speech, no abnormal thought content    PHQ over the last two weeks 7/12/2017   PHQ Not Done -   Little interest or pleasure in doing things Not at all   Feeling down, depressed or hopeless Not at all   Total Score PHQ 2 0     Hospital Outpatient Visit on 03/27/2018   Component Date Value Ref Range Status    Sodium 03/27/2018 142  136 - 145 mmol/L Final    Potassium 03/27/2018 4.3  3.5 - 5.5 mmol/L Final    Chloride 03/27/2018 106  100 - 108 mmol/L Final    CO2 03/27/2018 31  21 - 32 mmol/L Final    Anion gap 03/27/2018 5  3.0 - 18 mmol/L Final    Glucose 03/27/2018 109* 74 - 99 mg/dL Final    BUN 03/27/2018 18  7.0 - 18 MG/DL Final    Creatinine 03/27/2018 0.85  0.6 - 1.3 MG/DL Final    BUN/Creatinine ratio 03/27/2018 21* 12 - 20   Final    GFR est AA 03/27/2018 >60  >60 ml/min/1.73m2 Final    GFR est non-AA 03/27/2018 >60 >60 ml/min/1.73m2 Final    Comment: (NOTE)  Estimated GFR is calculated using the Modification of Diet in Renal   Disease (MDRD) Study equation, reported for both  Americans   (GFRAA) and non- Americans (GFRNA), and normalized to 1.73m2   body surface area. The physician must decide which value applies to   the patient. The MDRD study equation should only be used in   individuals age 25 or older. It has not been validated for the   following: pregnant women, patients with serious comorbid conditions,   or on certain medications, or persons with extremes of body size,   muscle mass, or nutritional status.  Calcium 2018 8.7  8.5 - 10.1 MG/DL Final   Office Visit on 2018   Component Date Value Ref Range Status    Hemoglobin A1c (POC) 2018 7.0  % Final    Glucose POC 2018 153  mg/dL Final     Assessment/Plan & Orders:         ICD-10-CM ICD-9-CM    1. Essential hypertension I10 401.9    2. White coat syndrome with hypertension I10 401.9    3. Controlled type 2 diabetes mellitus with microalbuminuria, without long-term current use of insulin (HCC) E11.29 250.40     R80.9 791.0    4. Vitamin D deficiency E55.9 268.9       Healthy lifestyle has been encouraged including avoidance of tobacco, limiting or avoiding alcohol intake, heart healthy diet which is low in cholesterol and saturated fat and contains fresh fruits, vegetables and whole grains and fiber, regular exercise with goals of 20-30 minutes 3-5 days weekly and maintaining an optimal BMI. Continue checking BP at home. Call us if not at goal of 150/90  Depression screenin/3/17    Follow-up Disposition:  Return in about 1 week (around 5/3/2018) for Blood pressure check, Follow up hypertension. *Patient verbalized understanding and agreement with the plan. Patient was given an after-visit summary. Zuly Chavez.  MD [facility] - Internal Medicine  2017, 11:20 AM  [address] [place] [address], [address] [place] [address] Drive  Phone [phone]  Fax [phone]

## 2018-04-26 NOTE — MR AVS SNAPSHOT
[address] 
 
 
 [address] [address] 
[phone] Patient: Maylin Vieira MRN: [id number] DGZ:[id number] Visit Information Date & Time Provider Department Dept. Phone Encounter #  
 4/26/2018 10:30 AM Yola Castillo MD [facility] [phone] [id number] Your Appointments 6/27/2018 10:30 AM  
Follow Up with Yola Castillo MD  
[facility]) Appt Note: Return in about 3 months (around 6/27/2018) for Follow up hypertension, Follow up diabetes mellitus, Go over lab/imaging results. [address] [place] [address] [phone]  
[address] [place] [place] [id number] Upcoming Health Maintenance Date Due HEMOGLOBIN A1C Q6M 9/27/2018 FOOT EXAM Q1 10/25/2018 MICROALBUMIN Q1 10/25/2018 MEDICARE YEARLY EXAM 10/26/2018 LIPID PANEL Q1 11/20/2018 EYE EXAM RETINAL OR DILATED Q1 11/22/2018 GLAUCOMA SCREENING Q2Y 11/22/2019 DTaP/Tdap/Td series (2 - Td) 12/10/2020 Allergies as of 4/26/2018  Review Complete On: 4/26/2018 By: Yola Castillo MD  
 No Known Allergies Current Immunizations  Never Reviewed Name Date Influenza Vaccine 10/15/2015, 12/31/2013, 1/2/2013, 11/2/2009 Influenza Vaccine (Quad) PF 10/25/2017  9:00 AM, 10/3/2016 Pneumococcal Polysaccharide (PPSV-23) 11/1/2016 12:00 AM  
 Pneumococcal Vaccine (Unspecified Type) 11/2/2009 Tdap 12/10/2010 Not reviewed this visit You Were Diagnosed With   
  
 Codes Comments Essential hypertension    -  Primary ICD-10-CM: I10 
ICD-9-CM: 401.9 Controlled type 2 diabetes mellitus with microalbuminuria, without long-term current use of insulin (HCC)     ICD-10-CM: E11.29, R80.9 ICD-9-CM: 250.40, 791.0 Vitamin D deficiency     ICD-10-CM: E55.9 ICD-9-CM: 268.9 Vitals BP Pulse Temp Resp Height(growth percentile) Weight(growth percentile) 200/80 (BP 1 Location: Left arm, BP Patient Position: Sitting) (!) 58 98.3 °F (36.8 °C) (Oral) 16 4' 10\" (1.473 m) 134 lb (60.8 kg) SpO2 BMI OB Status Smoking Status 100% 28.01 kg/m2 Menopause Never Smoker Vitals History BMI and BSA Data Body Mass Index Body Surface Area 28.01 kg/m 2 1.58 m 2 Preferred Pharmacy Pharmacy Name Phone [facility]/PHARMACY #[id number]- [address], [address] [place] [address] [phone] Your Updated Medication List  
  
   
This list is accurate as of 4/26/18 11:15 AM.  Always use your most recent med list. amLODIPine 10 mg tablet Commonly known as:  [place] Take  by mouth daily. aspirin 81 mg chewable tablet CHEW 1 TABLET BY MOUTH ONCE A DAY  
  
 atorvastatin 80 mg tablet Commonly known as:  LIPITOR Take 80 mg by mouth nightly. calcium-cholecalciferol (D3) tablet Commonly known as:  Calcium 600 + D Take 1 Tab by mouth two (2) times a day. chlorthalidone 25 mg tablet Commonly known as:  Jenene Legacy Take 0.5 Tabs by mouth daily. ergocalciferol 50,000 unit capsule Commonly known as:  ERGOCALCIFEROL Take 1 Cap by mouth every seven (7) days. Indications: Vitamin D Deficiency  
  
 glucose blood VI test strips strip Commonly known as:  blood glucose test  
Check BS BID. Dx code: E11.65  
  
 metFORMIN  mg tablet Commonly known as:  GLUCOPHAGE XR Take 1 Tab by mouth daily. metoprolol succinate 25 mg XL tablet Commonly known as:  TOPROL-XL Take 1 Tab by mouth daily. potassium chloride 20 mEq packet Commonly known as:  KLOR-CON Take 1 Packet by mouth every other day. valsartan 160 mg tablet Commonly known as:  DIOVAN Take 1 Tab by mouth daily. Introducing [facility]! Gunjan Casiano introduces [employer] patient portal. Now you can access parts of your medical record, email your doctor's office, and request medication refills online. 1. In your internet browser, go to [email or web address] 2. Click on the First Time User? Click Here link in the Sign In box. You will see the New Member Sign Up page. 3. Enter your M[employer] Access Code exactly as it appears below. You will not need to use this code after youve completed the sign-up process. If you do not sign up before the expiration date, you must request a new code. · M-[employer]Q Access Code: [email or web address] Expires: 6/25/2018 10:42 AM 
 
4. Enter the last four digits of your Social Security Number (xxxx) and Date of Birth (mm/dd/yyyy) as indicated and click Submit. You will be taken to the next sign-up page. 5. Create a [employer] ID. This will be your M-DAQ login ID and cannot be changed, so think of one that is secure and easy to remember. 6. Create a M-DAQ password. You can change your password at any time. 7. Enter your Password Reset Question and Answer. This can be used at a later time if you forget your password. 8. Enter your e-mail address. You will receive e-mail notification when new information is available in [address] 9. Click Sign Up. You can now view and download portions of your medical record. 10. Click the Download Summary menu link to download a portable copy of your medical information. If you have questions, please visit the Frequently Asked Questions section of the M-DAQ website. Remember, M-DAQ is NOT to be used for urgent needs. For medical emergencies, dial 911. Now available from your iPhone and Android! Please provide this summary of care documentation to your next provider. Your primary care clinician is listed as Teresa Sánchez. If you have any questions after today's visit, please call [phone].

## 2018-05-03 ENCOUNTER — OFFICE VISIT (OUTPATIENT)
Dept: FAMILY MEDICINE CLINIC | Facility: CLINIC | Age: 83
End: 2018-05-03

## 2018-05-03 VITALS
RESPIRATION RATE: 18 BRPM | BODY MASS INDEX: 28.55 KG/M2 | SYSTOLIC BLOOD PRESSURE: 160 MMHG | HEART RATE: 55 BPM | DIASTOLIC BLOOD PRESSURE: 82 MMHG | WEIGHT: 136 LBS | TEMPERATURE: 97.3 F | OXYGEN SATURATION: 100 % | HEIGHT: 58 IN

## 2018-05-03 DIAGNOSIS — R80.9 CONTROLLED TYPE 2 DIABETES MELLITUS WITH MICROALBUMINURIA, WITHOUT LONG-TERM CURRENT USE OF INSULIN (HCC): ICD-10-CM

## 2018-05-03 DIAGNOSIS — I10 ESSENTIAL HYPERTENSION: Primary | ICD-10-CM

## 2018-05-03 DIAGNOSIS — M85.80 OSTEOPENIA, UNSPECIFIED LOCATION: ICD-10-CM

## 2018-05-03 DIAGNOSIS — E11.29 CONTROLLED TYPE 2 DIABETES MELLITUS WITH MICROALBUMINURIA, WITHOUT LONG-TERM CURRENT USE OF INSULIN (HCC): ICD-10-CM

## 2018-05-03 NOTE — PROGRESS NOTES
Nate Mayberry is 80 y.o. female presents today for office visit for follow up for hypertension and diabetes. Pt is not fasting. Pt is in Room# 1.     1. Have you been to the ER, urgent care clinic since your last visit? Hospitalized since your last visit? No    2. Have you seen or consulted any other health care providers outside of the [address] [place] [address] since your last visit? Include any pap smears or colon screening. No    Health Maintenance reviewed.       Upcoming Appts  none    Requested Prescriptions      No prescriptions requested or ordered in this encounter       Visit Vitals    /84 (BP 1 Location: Left arm, BP Patient Position: Sitting)    Pulse (!) 55    Temp 97.3 °F (36.3 °C) (Oral)    Resp 18    Ht 4' 10\" (1.473 m)    Wt 136 lb (61.7 kg)    SpO2 100%    BMI 28.42 kg/m2

## 2018-05-03 NOTE — PROGRESS NOTES
Internal Medicine Progress Note    Today's Date:  2017   Patient:  Nate Mayberry  Patient :  1932    Subjective:     Chief Complaint   Patient presents with    Diabetes    Hypertension     Hypertension   This is a chronic problem. BP is not at goal in the office. Pt is on valsartan, chlorthalidone with potassium, norvasc and toprol. Pt reports compliance with these medications. Pt is also taking atenolol. Osteopenia  This is a chronic problem. This is stable. Pt is off fosamax. Pt was on fosamax for two years. Last dexa scan was in . Pt is on calcium/Vitamin D and ergocalciferol for vitamin D deficiency.       Diabetes mellitus  This is a chronic problem. This is controlled. Pt takes metformin. Pt is on aspirin and statin.  +microalbuminuria on an ARB.    Past Medical History:   Diagnosis Date    Advance directive discussed with patient 10/12/2016    Cigarette nicotine dependence in remission 10/12/2016    Dizziness 3/16/2017    Essential hypertension 10/3/2016    Microalbuminuria 2017    Osteopenia 10/3/2016    Osteoporosis     Pure hypercholesterolemia 2017    Type II diabetes mellitus ([facility] [address] 10/3/2016    Vitamin D deficiency 2017     History reviewed. No pertinent surgical history. reports that she has never smoked. She has never used smokeless tobacco. She reports that she does not drink alcohol or use illicit drugs. Family History   Problem Relation Age of Onset    Hypertension Mother     Cancer Mother      pancreas    Cancer Father      lung     No Known Allergies  Review of Systems   Positives in bold  CV:      chest pain, palpitations  PULM:  SOB, wheezing, cough, sputum production    Current Outpatient Meds and Allergies     Current Outpatient Prescriptions on File Prior to Visit   Medication Sig Dispense Refill    valsartan-hydroCHLOROthiazide (DIOVAN-HCT) 320-25 mg per tablet Take 1 Tab by mouth daily.       pravastatin (PRAVACHOL) 40 mg tablet Take 40 mg by mouth nightly.  metoprolol succinate (TOPROL-XL) 25 mg XL tablet Take 1 Tab by mouth daily. 90 Tab 3    metFORMIN (GLUCOPHAGE) 500 mg tablet Take 1 Tab by mouth two (2) times daily (with meals). 180 Tab 3    glucose blood VI test strips (BLOOD GLUCOSE TEST) strip Check BS BID. Dx code: E11.65 200 Strip 3    calcium-cholecalciferol, D3, (CALCIUM 600 + D) tablet Take 1 Tab by mouth two (2) times a day. 180 Tab 3    aspirin delayed-release 81 mg tablet Take  by mouth daily. No current facility-administered medications on file prior to visit.       No Known Allergies  Objective:     VS:    Visit Vitals    BP (!) 154/98 (BP 1 Location: Left arm, BP Patient Position: Sitting)  Comment (BP 1 Location): manual    Pulse 62    Temp 96.9 °F (36.1 °C) (Oral)    Resp 20    Ht 4' 11\" (1.499 m)    Wt 128 lb 3.2 oz (58.2 kg)    SpO2 100%    BMI 25.89 kg/m2     General:   Well-nourished, well-groomed, pleasant, alert, in no acute distress  Ears:  External ears WNL, TMs WNL  Eyes:  EOMI, PERRL  Nose:  External nares WNL  Psych:  No pressured speech, no abnormal thought content    PHQ over the last two weeks 7/12/2017   PHQ Not Done -   Little interest or pleasure in doing things Not at all   Feeling down, depressed or hopeless Not at all   Total Score PHQ 2 0     Hospital Outpatient Visit on 03/27/2018   Component Date Value Ref Range Status    Sodium 03/27/2018 142  136 - 145 mmol/L Final    Potassium 03/27/2018 4.3  3.5 - 5.5 mmol/L Final    Chloride 03/27/2018 106  100 - 108 mmol/L Final    CO2 03/27/2018 31  21 - 32 mmol/L Final    Anion gap 03/27/2018 5  3.0 - 18 mmol/L Final    Glucose 03/27/2018 109* 74 - 99 mg/dL Final    BUN 03/27/2018 18  7.0 - 18 MG/DL Final    Creatinine 03/27/2018 0.85  0.6 - 1.3 MG/DL Final    BUN/Creatinine ratio 03/27/2018 21* 12 - 20   Final    GFR est AA 03/27/2018 >60  >60 ml/min/1.73m2 Final    GFR est non-AA 03/27/2018 >60  >60 ml/min/1.73m2 Final Comment: (NOTE)  Estimated GFR is calculated using the Modification of Diet in Renal   Disease (MDRD) Study equation, reported for both  Americans   (GFRAA) and non- Americans (GFRNA), and normalized to 1.73m2   body surface area. The physician must decide which value applies to   the patient. The MDRD study equation should only be used in   individuals age 25 or older. It has not been validated for the   following: pregnant women, patients with serious comorbid conditions,   or on certain medications, or persons with extremes of body size,   muscle mass, or nutritional status.  Calcium 2018 8.7  8.5 - 10.1 MG/DL Final   Office Visit on 2018   Component Date Value Ref Range Status    Hemoglobin A1c (POC) 2018 7.0  % Final    Glucose POC 2018 153  mg/dL Final     Assessment/Plan & Orders:         ICD-10-CM ICD-9-CM    1. Essential hypertension I10 401.9    2. Controlled type 2 diabetes mellitus with microalbuminuria, without long-term current use of insulin (HCC) E11.29 250.40     R80.9 791.0    3. Osteopenia, unspecified location M85.80 733.90       Healthy lifestyle has been encouraged including avoidance of tobacco, limiting or avoiding alcohol intake, heart healthy diet which is low in cholesterol and saturated fat and contains fresh fruits, vegetables and whole grains and fiber, regular exercise with goals of 20-30 minutes 3-5 days weekly and maintaining an optimal BMI. Continue checking BP at home. Call us if not at goal of 150/90  Pt asked to stop atenolol  Pt asked to increase toprol to 2 tabs  Depression screenin2017    Follow-up Disposition:  Return in about 1 week (around 5/10/2018) for Blood pressure check, Follow up hypertension, Follow up diabetes mellitus. *Patient verbalized understanding and agreement with the plan. Patient was given an after-visit summary. Marlene Yao.  Nadeen Sarmiento MD - Internal Medicine  2017, 11:20 AM  [facility] [address] [facility], [address]  Phone [phone]  Fax [phone]

## 2018-05-07 NOTE — PATIENT INSTRUCTIONS
High Blood Pressure: Care Instructions  Your Care Instructions    If your blood pressure is usually above 140/90, you have high blood pressure, or hypertension. That means the top number is 140 or higher or the bottom number is 90 or higher, or both. Despite what a lot of people think, high blood pressure usually doesn't cause headaches or make you feel dizzy or lightheaded. It usually has no symptoms. But it does increase your risk for heart attack, stroke, and kidney or eye damage. The higher your blood pressure, the more your risk increases. Your doctor will give you a goal for your blood pressure. Your goal will be based on your health and your age. An example of a goal is to keep your blood pressure below 140/90. Lifestyle changes, such as eating healthy and being active, are always important to help lower blood pressure. You might also take medicine to reach your blood pressure goal.  Follow-up care is a key part of your treatment and safety. Be sure to make and go to all appointments, and call your doctor if you are having problems. It's also a good idea to know your test results and keep a list of the medicines you take. How can you care for yourself at home? Medical treatment  · If you stop taking your medicine, your blood pressure will go back up. You may take one or more types of medicine to lower your blood pressure. Be safe with medicines. Take your medicine exactly as prescribed. Call your doctor if you think you are having a problem with your medicine. · Talk to your doctor before you start taking aspirin every day. Aspirin can help certain people lower their risk of a heart attack or stroke. But taking aspirin isn't right for everyone, because it can cause serious bleeding. · See your doctor regularly. You may need to see the doctor more often at first or until your blood pressure comes down.   · If you are taking blood pressure medicine, talk to your doctor before you take decongestants or anti-inflammatory medicine, such as ibuprofen. Some of these medicines can raise blood pressure. · Learn how to check your blood pressure at home. Lifestyle changes  · Stay at a healthy weight. This is especially important if you put on weight around the waist. Losing even 10 pounds can help you lower your blood pressure. · If your doctor recommends it, get more exercise. Walking is a good choice. Bit by bit, increase the amount you walk every day. Try for at least 30 minutes on most days of the week. You also may want to swim, bike, or do other activities. · Avoid or limit alcohol. Talk to your doctor about whether you can drink any alcohol. · Try to limit how much sodium you eat to less than 2,300 milligrams (mg) a day. Your doctor may ask you to try to eat less than 1,500 mg a day. · Eat plenty of fruits (such as bananas and oranges), vegetables, legumes, whole grains, and low-fat dairy products. · Lower the amount of saturated fat in your diet. Saturated fat is found in animal products such as milk, cheese, and meat. Limiting these foods may help you lose weight and also lower your risk for heart disease. · Do not smoke. Smoking increases your risk for heart attack and stroke. If you need help quitting, talk to your doctor about stop-smoking programs and medicines. These can increase your chances of quitting for good. When should you call for help? Call 911 anytime you think you may need emergency care. This may mean having symptoms that suggest that your blood pressure is causing a serious heart or blood vessel problem. Your blood pressure may be over 180/110. ? For example, call 911 if:  ? · You have symptoms of a heart attack. These may include:  ¨ Chest pain or pressure, or a strange feeling in the chest.  ¨ Sweating. ¨ Shortness of breath. ¨ Nausea or vomiting.   ¨ Pain, pressure, or a strange feeling in the back, neck, jaw, or upper belly or in one or both shoulders or arms.  ¨ Lightheadedness or sudden weakness. ¨ A fast or irregular heartbeat. ? · You have symptoms of a stroke. These may include:  ¨ Sudden numbness, tingling, weakness, or loss of movement in your face, arm, or leg, especially on only one side of your body. ¨ Sudden vision changes. ¨ Sudden trouble speaking. ¨ Sudden confusion or trouble understanding simple statements. ¨ Sudden problems with walking or balance. ¨ A sudden, severe headache that is different from past headaches. ? · You have severe back or belly pain. ?Do not wait until your blood pressure comes down on its own. Get help right away. ?Call your doctor now or seek immediate care if:  ? · Your blood pressure is much higher than normal (such as 180/110 or higher), but you don't have symptoms. ? · You think high blood pressure is causing symptoms, such as:  ¨ Severe headache. ¨ Blurry vision. ? Watch closely for changes in your health, and be sure to contact your doctor if:  ? · Your blood pressure measures 140/90 or higher at least 2 times. That means the top number is 140 or higher or the bottom number is 90 or higher, or both. ? · You think you may be having side effects from your blood pressure medicine. ? · Your blood pressure is usually normal, but it goes above normal at least 2 times. Where can you learn more? Go to http://shashank-louie.info/. Enter L535 in the search box to learn more about \"High Blood Pressure: Care Instructions. \"  Current as of: September 21, 2016  Content Version: 11.4  © 3476-1841 [employer]. Care instructions adapted under license by [employer] (which disclaims liability or warranty for this information). If you have questions about a medical condition or this instruction, always ask your healthcare professional. [place] [address] any warranty or liability for your use of this information.

## 2018-05-10 ENCOUNTER — OFFICE VISIT (OUTPATIENT)
Dept: FAMILY MEDICINE CLINIC | Facility: CLINIC | Age: 83
End: 2018-05-10

## 2018-05-10 VITALS
RESPIRATION RATE: 16 BRPM | HEIGHT: 58 IN | SYSTOLIC BLOOD PRESSURE: 158 MMHG | BODY MASS INDEX: 28.13 KG/M2 | OXYGEN SATURATION: 99 % | TEMPERATURE: 96.7 F | WEIGHT: 134 LBS | HEART RATE: 54 BPM | DIASTOLIC BLOOD PRESSURE: 70 MMHG

## 2018-05-10 DIAGNOSIS — E55.9 VITAMIN D DEFICIENCY: ICD-10-CM

## 2018-05-10 DIAGNOSIS — E66.3 OVERWEIGHT (BMI 25.0-29.9): ICD-10-CM

## 2018-05-10 DIAGNOSIS — E11.29 CONTROLLED TYPE 2 DIABETES MELLITUS WITH MICROALBUMINURIA, WITHOUT LONG-TERM CURRENT USE OF INSULIN (HCC): ICD-10-CM

## 2018-05-10 DIAGNOSIS — R80.9 CONTROLLED TYPE 2 DIABETES MELLITUS WITH MICROALBUMINURIA, WITHOUT LONG-TERM CURRENT USE OF INSULIN (HCC): ICD-10-CM

## 2018-05-10 DIAGNOSIS — I10 ESSENTIAL HYPERTENSION: Primary | ICD-10-CM

## 2018-05-10 NOTE — PROGRESS NOTES
Marbin Goldstein is 80 y.o. female presents today for office visit for follow up for hypertension, hyperlipidemia and diabetes. Pt is not fasting. Pt is in Room# 2.     1. Have you been to the ER, urgent care clinic since your last visit? Hospitalized since your last visit? No    2. Have you seen or consulted any other health care providers outside of the [address] [place] [address] since your last visit? Include any pap smears or colon screening. No    Health Maintenance reviewed. Upcoming Appts  none    Requested Prescriptions     Signed Prescriptions Disp Refills    SITagliptin (JANUVIA) 50 mg tablet 90 Tab 3     Sig: Take 1 Tab by mouth daily.        Visit Vitals    /70 (BP 1 Location: Left arm, BP Patient Position: Sitting)  Comment (BP 1 Location): manual    Pulse (!) 54    Temp 96.7 °F (35.9 °C) (Oral)    Resp 16    Ht 4' 10\" (1.473 m)    Wt 134 lb (60.8 kg)    SpO2 99%    BMI 28.01 kg/m2

## 2018-05-10 NOTE — PROGRESS NOTES
Internal Medicine Progress Note    Today's Date:  2017   Patient:  Marbin Goldstein  Patient :  1932    Subjective:     Chief Complaint   Patient presents with    Diabetes    Hypertension    Weight Management     Hypertension   This is a chronic problem. BP is not at goal in the office. Pt is on valsartan, chlorthalidone with potassium, norvasc and toprol. Pt reports compliance with these medications. BP log reviewed. Osteopenia  This is a chronic problem. This is stable. Pt is off fosamax. Pt was on fosamax for two years. Last dexa scan was in . Pt is on calcium/Vitamin D and ergocalciferol for vitamin D deficiency.       Diabetes mellitus  This is a chronic problem. This is controlled. Pt takes metformin. Pt is on aspirin and statin.  +microalbuminuria on an ARB. Pt is requesting to come off metformin and start a new medication for diabetes.      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 No pertinent surgical history. reports that she has never smoked. She has never used smokeless tobacco. She reports that she does not drink alcohol or use illicit drugs.   Family History   Problem Relation Age of Onset    Hypertension Mother     Cancer Mother      pancreas    Cancer Father      lung     No Known Allergies  Review of Systems   Positives in bold  CV:      chest pain, palpitations  PULM:  SOB, wheezing, cough, sputum production    Current Outpatient Meds and Allergies     Current Outpatient Prescriptions on File Prior to Visit   Medication Sig Dispense Refill    valsartan-hydroCHLOROthiazide (DIOVAN-HCT) 320-25 mg per tablet Take 1 Tab by mouth daily.  pravastatin (PRAVACHOL) 40 mg tablet Take 40 mg by mouth nightly.  metoprolol succinate (TOPROL-XL) 25 mg XL tablet Take 1 Tab by mouth daily. 90 Tab 3    metFORMIN (GLUCOPHAGE) 500 mg tablet Take 1 Tab by mouth two (2) times daily (with meals). 180 Tab 3    glucose blood VI test strips (BLOOD GLUCOSE TEST) strip Check BS BID. Dx code: E11.65 200 Strip 3    calcium-cholecalciferol, D3, (CALCIUM 600 + D) tablet Take 1 Tab by mouth two (2) times a day. 180 Tab 3    aspirin delayed-release 81 mg tablet Take  by mouth daily. No current facility-administered medications on file prior to visit.       No Known Allergies  Objective:     VS:    Visit Vitals    BP (!) 154/98 (BP 1 Location: Left arm, BP Patient Position: Sitting)  Comment (BP 1 Location): manual    Pulse 62    Temp 96.9 °F (36.1 °C) (Oral)    Resp 20    Ht 4' 11\" (1.499 m)    Wt 128 lb 3.2 oz (58.2 kg)    SpO2 100%    BMI 25.89 kg/m2     General:   Well-nourished, well-groomed, pleasant, alert, in no acute distress  Ears:  External ears WNL, TMs WNL  Eyes:  EOMI, PERRL  Nose:  External nares WNL  Psych:  No pressured speech, no abnormal thought content    PHQ over the last two weeks 7/12/2017   PHQ Not Done -   Little interest or pleasure in doing things Not at all   Feeling down, depressed or hopeless Not at all   Total Score PHQ 2 0     Hospital Outpatient Visit on 03/27/2018   Component Date Value Ref Range Status    Sodium 03/27/2018 142  136 - 145 mmol/L Final    Potassium 03/27/2018 4.3  3.5 - 5.5 mmol/L Final    Chloride 03/27/2018 106  100 - 108 mmol/L Final    CO2 03/27/2018 31  21 - 32 mmol/L Final    Anion gap 03/27/2018 5  3.0 - 18 mmol/L Final    Glucose 03/27/2018 109* 74 - 99 mg/dL Final    BUN 03/27/2018 18  7.0 - 18 MG/DL Final    Creatinine 03/27/2018 0.85  0.6 - 1.3 MG/DL Final    BUN/Creatinine ratio 03/27/2018 21* 12 - 20   Final    GFR est AA 03/27/2018 >60  >60 ml/min/1.73m2 Final    GFR est non-AA 2018 >60  >60 ml/min/1.73m2 Final    Comment: (NOTE)  Estimated GFR is calculated using the Modification of Diet in Renal   Disease (MDRD) Study equation, reported for both  Americans   (GFRAA) and non- Americans (GFRNA), and normalized to 1.73m2   body surface area. The physician must decide which value applies to   the patient. The MDRD study equation should only be used in   individuals age 25 or older. It has not been validated for the   following: pregnant women, patients with serious comorbid conditions,   or on certain medications, or persons with extremes of body size,   muscle mass, or nutritional status.  Calcium 2018 8.7  8.5 - 10.1 MG/DL Final   Office Visit on 2018   Component Date Value Ref Range Status    Hemoglobin A1c (POC) 2018 7.0  % Final    Glucose POC 2018 153  mg/dL Final     Assessment/Plan & Orders:         ICD-10-CM ICD-9-CM    1. Essential hypertension V70 627.2 METABOLIC PANEL, BASIC   2. Controlled type 2 diabetes mellitus with microalbuminuria, without long-term current use of insulin (HCC) E11.29 250.40 SITagliptin (JANUVIA) 50 mg tablet    R80.9 791.0    3. Vitamin D deficiency E55.9 268.9    4. Overweight (BMI 25.0-29. 9) E66.3 278.02       Healthy lifestyle has been encouraged including avoidance of tobacco, limiting or avoiding alcohol intake, heart healthy diet which is low in cholesterol and saturated fat and contains fresh fruits, vegetables and whole grains and fiber, regular exercise with goals of 20-30 minutes 3-5 days weekly and maintaining an optimal BMI. Continue checking BP at home. Call us if not at goal of 150/90  Stop metformin  Check BMP in 1 mo  Depression screenin2017    Follow-up Disposition:  Return in about 3 months (around 8/10/2018) for Go over lab/imaging results, Follow up hypertension, Follow up diabetes mellitus, Follow up weight m.     *Patient verbalized understanding and agreement with the plan. Patient was given an after-visit summary. Jackie Perkins.  Rui1 JOSE Donaldson MD - Internal Medicine  9/27/2017, 11:20 AM  [facility]  [address] [place], [address]  Phone [phone]  Fax [phone]

## 2018-05-10 NOTE — MR AVS SNAPSHOT
[address] [facility] 
 
 
 501 [facility] [address] 
[phone] Patient: Samantha Ontiveros MRN: [id number] DLQ:[id number] Visit Information Date & Time Provider Department Dept. Phone Encounter #  
 5/10/2018 10:15 AM Brenda Cast MD [facility] [phone] [id number] Follow-up Instructions Return in about 3 months (around 8/10/2018) for Go over lab/imaging results. Your Appointments 6/27/2018 10:30 AM  
Follow Up with Brenda Cast MD  
[facility]) Appt Note: Return in about 3 months (around 6/27/2018) for Follow up hypertension, Follow up diabetes mellitus, Go over lab/imaging results. [address] [place] [phone]  
[address] [place] [id number] Upcoming Health Maintenance Date Due Influenza Age 5 to Adult 8/1/2018 HEMOGLOBIN A1C Q6M 9/27/2018 FOOT EXAM Q1 10/25/2018 MICROALBUMIN Q1 10/25/2018 MEDICARE YEARLY EXAM 10/26/2018 LIPID PANEL Q1 11/20/2018 EYE EXAM RETINAL OR DILATED Q1 11/22/2018 GLAUCOMA SCREENING Q2Y 11/22/2019 DTaP/Tdap/Td series (2 - Td) 12/10/2020 Allergies as of 5/10/2018  Review Complete On: 5/10/2018 By: Brenda Cast MD  
 No Known Allergies Current Immunizations  Never Reviewed Name Date Influenza Vaccine 10/15/2015, 12/31/2013, 1/2/2013, 11/2/2009 Influenza Vaccine (Quad) PF 10/25/2017  9:00 AM, 10/3/2016 Pneumococcal Polysaccharide (PPSV-23) 11/1/2016 12:00 AM  
 Pneumococcal Vaccine (Unspecified Type) 11/2/2009 Tdap 12/10/2010 Not reviewed this visit You Were Diagnosed With   
  
 Codes Comments Essential hypertension    -  Primary ICD-10-CM: I10 
ICD-9-CM: 401.9 Controlled type 2 diabetes mellitus with microalbuminuria, without long-term current use of insulin (HCC)     ICD-10-CM: E11.29, R80.9 ICD-9-CM: 250.40, 791.0 Vitamin D deficiency     ICD-10-CM: E55.9 ICD-9-CM: 268.9 Vitals BP Pulse Temp Resp Height(growth percentile) Weight(growth percentile) 158/70 (BP 1 Location: Left arm, BP Patient Position: Sitting) (!) 54 96.7 °F (35.9 °C) (Oral) 16 4' 10\" (1.473 m) 134 lb (60.8 kg) SpO2 BMI OB Status Smoking Status 99% 28.01 kg/m2 Menopause Never Smoker Vitals History BMI and BSA Data Body Mass Index Body Surface Area 28.01 kg/m 2 1.58 m 2 Preferred Pharmacy Pharmacy Name Phone [id number] [address], [address] [place] [address] [phone] Your Updated Medication List  
  
   
This list is accurate as of 5/10/18 10:33 AM.  Always use your most recent med list. amLODIPine 10 mg tablet Commonly known as:  Elizabeth Barges TAKE 1 TABLET BY MOUTH ONCE A DAY  
  
 aspirin 81 mg chewable tablet CHEW 1 TABLET BY MOUTH ONCE A DAY  
  
 atorvastatin 80 mg tablet Commonly known as:  LIPITOR Take 80 mg by mouth nightly. calcium-cholecalciferol (D3) tablet Commonly known as:  Calcium 600 + D Take 1 Tab by mouth two (2) times a day. chlorthalidone 25 mg tablet Commonly known as:  Wells Van Horn Take 0.5 Tabs by mouth daily. glucose blood VI test strips strip Commonly known as:  blood glucose test  
Check BS BID. Dx code: E11.65  
  
 metFORMIN  mg tablet Commonly known as:  GLUCOPHAGE XR Take 1 Tab by mouth daily. metoprolol succinate 25 mg XL tablet Commonly known as:  TOPROL-XL Take 1 Tab by mouth daily. potassium chloride 20 mEq packet Commonly known as:  KLOR-CON Take 1 Packet by mouth every other day. SITagliptin 50 mg tablet Commonly known as:  Ky Peasant Take 1 Tab by mouth daily. valsartan 160 mg tablet Commonly known as:  DIOVAN Take 1 Tab by mouth daily. Prescriptions Sent to Pharmacy Refills  SITagliptin (JANUVIA) 50 mg tablet 3  
 Sig: Take 1 Tab by mouth daily. Class: Normal  
 Pharmacy: [facility], [address] [facility]  #: [phone] Route: Oral  
  
Follow-up Instructions Return in about 3 months (around 8/10/2018) for Go over lab/imaging results. Patient Instructions Learning About Diabetes and Your Teeth How does diabetes affect your teeth and gums? When you have diabetes, managing blood sugar levels and taking good care of your teeth and gums are both important. When blood sugar levels are high, there's a greater risk for: · Gum (periodontal) disease. · Tooth decay. · Fungal infections in the mouth, like thrush. · Dry mouth, or xerostomia (say \"zee-toneyh-STO-sommer-uh\"). The mouth needs saliva to neutralize the acids in your mouth. These acids can lead to gum disease and tooth decay. Keeping your blood sugar levels in your target range can help prevent problems with the teeth and gums. If you have any problems with your teeth or gums, see your dentist. 
How do you care for your teeth and gums when you have diabetes? · Brush your teeth twice a day. · Floss daily. Make sure to press the floss against your teeth and not your gums. · Check each day for areas where your gums might be red or painful. Be sure to let your dentist know of any sores in your mouth. · See your dentist regularly for professional cleaning of your teeth and to look for gum problems. Many dentists recommend getting checkups twice a year. Remind your dentist that you have diabetes before any work is done. · Don't smoke or use smokeless tobacco. Tobacco use with diabetes can lead to a greater risk of severe gum disease. If you need help quitting, talk to your doctor about stop-smoking programs and medicines. These can increase your chances of quitting for good. Follow-up care is a key part of your treatment and safety.  Be sure to make and go to all appointments, and call your doctor if you are having problems. It's also a good idea to know your test results and keep a list of the medicines you take. Where can you learn more? Go to http://shashank-louie.info/. Enter L996 in the search box to learn more about \"Learning About Diabetes and Your Teeth. \" 
Current as of: March 13, 2017 Content Version: 11.4 © 6945-4804 [employer]. Care instructions adapted under license by [employer] (which disclaims liability or warranty for this information). If you have questions about a medical condition or this instruction, always ask your healthcare professional. [address] any warranty or liability for your use of this information. Introducing [facility]! [facility] introduces ClearContext patient portal. Now you can access parts of your medical record, email your doctor's office, and request medication refills online. 1. In your internet browser, go to [email or web address] 2. Click on the First Time User? Click Here link in the Sign In box. You will see the New Member Sign Up page. 3. Enter your ClearContext Access Code exactly as it appears below. You will not need to use this code after youve completed the sign-up process. If you do not sign up before the expiration date, you must request a new code. · ClearContext Access Code: [email or web address] Expires: 6/25/2018 10:42 AM 
 
4. Enter the last four digits of your Social Security Number (xxxx) and Date of Birth (mm/dd/yyyy) as indicated and click Submit. You will be taken to the next sign-up page. 5. Create a ClearContext ID. This will be your ClearContext login ID and cannot be changed, so think of one that is secure and easy to remember. 6. Create a ClearContext password. You can change your password at any time. 7. Enter your Password Reset Question and Answer. This can be used at a later time if you forget your password. 8. Enter your e-mail address. You will receive e-mail notification when new information is available in [address] 9. Click Sign Up. You can now view and download portions of your medical record. 10. Click the Download Summary menu link to download a portable copy of your medical information. If you have questions, please visit the Frequently Asked Questions section of the [employer] website. Remember, [employer] is NOT to be used for urgent needs. For medical emergencies, dial 911. Now available from your iPhone and Android! Please provide this summary of care documentation to your next provider. Your primary care clinician is listed as Ivis Yip. If you have any questions after today's visit, please call [phone].

## 2018-05-10 NOTE — PATIENT INSTRUCTIONS
Learning About Diabetes and Your Teeth  How does diabetes affect your teeth and gums? When you have diabetes, managing blood sugar levels and taking good care of your teeth and gums are both important. When blood sugar levels are high, there's a greater risk for:  · Gum (periodontal) disease. · Tooth decay. · Fungal infections in the mouth, like thrush. · Dry mouth, or xerostomia (say \"federica-ro-STO-sommer-uh\"). The mouth needs saliva to neutralize the acids in your mouth. These acids can lead to gum disease and tooth decay. Keeping your blood sugar levels in your target range can help prevent problems with the teeth and gums. If you have any problems with your teeth or gums, see your dentist.  How do you care for your teeth and gums when you have diabetes? · Brush your teeth twice a day. · Floss daily. Make sure to press the floss against your teeth and not your gums. · Check each day for areas where your gums might be red or painful. Be sure to let your dentist know of any sores in your mouth. · See your dentist regularly for professional cleaning of your teeth and to look for gum problems. Many dentists recommend getting checkups twice a year. Remind your dentist that you have diabetes before any work is done. · Don't smoke or use smokeless tobacco. Tobacco use with diabetes can lead to a greater risk of severe gum disease. If you need help quitting, talk to your doctor about stop-smoking programs and medicines. These can increase your chances of quitting for good. Follow-up care is a key part of your treatment and safety. Be sure to make and go to all appointments, and call your doctor if you are having problems. It's also a good idea to know your test results and keep a list of the medicines you take. Where can you learn more? Go to http://shashank-louie.info/. Enter M412 in the search box to learn more about \"Learning About Diabetes and Your Teeth. \"  Current as of: March 13, 2017  Content Version: 11.4  © 7055-7588 Healthwise, Incorporated. Care instructions adapted under license by [employer] (which disclaims liability or warranty for this information). If you have questions about a medical condition or this instruction, always ask your healthcare professional. [address] any warranty or liability for your use of this information.

## 2018-07-02 ENCOUNTER — OFFICE VISIT (OUTPATIENT)
Dept: FAMILY MEDICINE CLINIC | Facility: CLINIC | Age: 83
End: 2018-07-02

## 2018-07-02 VITALS
HEIGHT: 58 IN | HEART RATE: 52 BPM | OXYGEN SATURATION: 98 % | DIASTOLIC BLOOD PRESSURE: 82 MMHG | SYSTOLIC BLOOD PRESSURE: 124 MMHG | RESPIRATION RATE: 18 BRPM | WEIGHT: 134 LBS | BODY MASS INDEX: 28.13 KG/M2 | TEMPERATURE: 99.1 F

## 2018-07-02 DIAGNOSIS — I10 WHITE COAT SYNDROME WITH HYPERTENSION: ICD-10-CM

## 2018-07-02 DIAGNOSIS — R80.9 CONTROLLED TYPE 2 DIABETES MELLITUS WITH MICROALBUMINURIA, WITHOUT LONG-TERM CURRENT USE OF INSULIN (HCC): Primary | ICD-10-CM

## 2018-07-02 DIAGNOSIS — E11.29 CONTROLLED TYPE 2 DIABETES MELLITUS WITH MICROALBUMINURIA, WITHOUT LONG-TERM CURRENT USE OF INSULIN (HCC): Primary | ICD-10-CM

## 2018-07-02 DIAGNOSIS — E55.9 VITAMIN D DEFICIENCY: ICD-10-CM

## 2018-07-02 DIAGNOSIS — I10 ESSENTIAL HYPERTENSION: ICD-10-CM

## 2018-07-02 DIAGNOSIS — E66.3 OVERWEIGHT (BMI 25.0-29.9): ICD-10-CM

## 2018-07-02 LAB — HBA1C MFR BLD HPLC: 7.7 %

## 2018-07-02 NOTE — MR AVS SNAPSHOT
[address] [id number] 
[phone] Patient: Juancarlos Rosenthal MRN: [id number] DNM:[birth year] Visit Information Date & Time Provider Department Dept. Phone Encounter #  
 7/2/2018 10:30 AM Lopez Modi  Valentin Nazario [phone] [id number] Follow-up Instructions Return in about 3 months (around 10/2/2018) for Follow up weight management, Follow up diabetes mellitus, Follow up hypertension. Your Appointments 7/2/2018 10:30 AM  
Follow Up with Lopez Modi MD  
[facility]) Appt Note: Return in about 3 months (around 6/27/2018) for Follow up hypertension, Follow up diabetes mellitus, Go over lab/imaging results.; Return in about 3 months (around 6/27/2018) for Follow up hypertension, Follow up diabetes mellitus, Go over lab/imaging results. ; left a vm for pt to call back and reschedule; Rescheduled from 6/11.; Return in about 3 months (around 6/27/2018) for Follow up hypertension, Follow up diabetes mellitus, Go over lab/imaging results. [address] [place] [phone] [facility] [address] Upcoming Health Maintenance Date Due Influenza Age 5 to Adult 8/1/2018 HEMOGLOBIN A1C Q6M 9/27/2018 FOOT EXAM Q1 10/25/2018 MICROALBUMIN Q1 10/25/2018 MEDICARE YEARLY EXAM 10/26/2018 LIPID PANEL Q1 11/20/2018 EYE EXAM RETINAL OR DILATED Q1 11/22/2018 GLAUCOMA SCREENING Q2Y 11/22/2019 DTaP/Tdap/Td series (2 - Td) 12/10/2020 Allergies as of 7/2/2018  Review Complete On: 7/2/2018 By: Lopez Modi MD  
 No Known Allergies Current Immunizations  Never Reviewed Name Date Influenza Vaccine 10/15/2015, 12/31/2013, 1/2/2013, 11/2/2009 Influenza Vaccine (Quad) PF 10/25/2017  9:00 AM, 10/3/2016  Pneumococcal Polysaccharide (PPSV-23) 11/1/2016 12:00 AM  
 Pneumococcal Vaccine (Unspecified Type) 11/2/2009 Tdap 12/10/2010 Not reviewed this visit You Were Diagnosed With   
  
 Codes Comments Controlled type 2 diabetes mellitus with microalbuminuria, without long-term current use of insulin (HCC)    -  Primary ICD-10-CM: E11.29, R80.9 ICD-9-CM: 250.40, 791.0 Essential hypertension     ICD-10-CM: I10 
ICD-9-CM: 401.9 White coat syndrome with hypertension     ICD-10-CM: I10 
ICD-9-CM: 401.9 Overweight (BMI 25.0-29. 9)     ICD-10-CM: L34.0 ICD-9-CM: 278.02 Vitamin D deficiency     ICD-10-CM: E55.9 ICD-9-CM: 268.9 Vitals BP Pulse Temp Resp Height(growth percentile) Weight(growth percentile) 124/82 (BP 1 Location: Left arm, BP Patient Position: Sitting) (!) 52 99.1 °F (37.3 °C) (Oral) 18 4' 10\" (1.473 m) 134 lb (60.8 kg) SpO2 BMI OB Status Smoking Status 98% 28.01 kg/m2 Menopause Never Smoker Vitals History BMI and BSA Data Body Mass Index Body Surface Area 28.01 kg/m 2 1.58 m 2 Preferred Pharmacy Pharmacy Name Phone [id number]- [address] [phone] Your Updated Medication List  
  
   
This list is accurate as of 7/2/18 10:13 AM.  Always use your most recent med list. amLODIPine 10 mg tablet Commonly known as:  Izetta Harder TAKE 1 TABLET BY MOUTH ONCE A DAY  
  
 aspirin 81 mg chewable tablet CHEW 1 TABLET BY MOUTH ONCE A DAY  
  
 atorvastatin 80 mg tablet Commonly known as:  LIPITOR Take 80 mg by mouth nightly. calcium-cholecalciferol (D3) tablet Commonly known as:  Calcium 600 + D Take 1 Tab by mouth two (2) times a day. chlorthalidone 25 mg tablet Commonly known as:  Debbie Stakes Take 0.5 Tabs by mouth daily. glucose blood VI test strips strip Commonly known as:  blood glucose test  
Check BS BID. Dx code: E11.65  
  
 metFORMIN  mg tablet Commonly known as:  GLUCOPHAGE XR  
 Take 1 Tab by mouth daily. metoprolol succinate 25 mg XL tablet Commonly known as:  TOPROL-XL Take 1 Tab by mouth daily. potassium chloride 20 mEq packet Commonly known as:  KLOR-CON Take 1 Packet by mouth every other day. valsartan 160 mg tablet Commonly known as:  DIOVAN Take 1 Tab by mouth daily. We Performed the Following AMB POC HEMOGLOBIN A1C [61293 CPT(R)] Follow-up Instructions Return in about 3 months (around 10/2/2018) for Follow up weight management, Follow up diabetes mellitus, Follow up hypertension. To-Do List   
 07/02/2018 Lab:  HEMOGLOBIN A1C WITH EAG   
  
 07/02/2018 Lab:  T4, FREE   
  
 07/02/2018 Lab:  TSH 3RD GENERATION   
  
 07/02/2018 Lab:  VITAMIN D, 25 HYDROXY   
  
 07/05/2018 Lab:  CBC WITH AUTOMATED DIFF   
  
 07/05/2018 Lab:  LIPID PANEL   
  
 07/05/2018 Lab:  METABOLIC PANEL, COMPREHENSIVE Patient Instructions Information given on ketogenic/IF diet Introducing [facility]! [facility] introduces AdCamp patient portal. Now you can access parts of your medical record, email your doctor's office, and request medication refills online. 1. In your internet browser, go to [email or web address] 2. Click on the First Time User? Click Here link in the Sign In box. You will see the New Member Sign Up page. 3. Enter your AdCamp Access Code exactly as it appears below. You will not need to use this code after youve completed the sign-up process. If you do not sign up before the expiration date, you must request a new code. · [employer]Camp Access Code: [email or web address] Expires: 9/30/2018 10:12 AM 
 
4. Enter the last four digits of your Social Security Number (xxxx) and Date of Birth (mm/dd/yyyy) as indicated and click Submit. You will be taken to the next sign-up page. 5. Create a My Healthy Worldt ID.  This will be your My Healthy Worldt login ID and cannot be changed, so think of one that is secure and easy to remember. 6. Create a Cellca password. You can change your password at any time. 7. Enter your Password Reset Question and Answer. This can be used at a later time if you forget your password. 8. Enter your e-mail address. You will receive e-mail notification when new information is available in [address] 9. Click Sign Up. You can now view and download portions of your medical record. 10. Click the Download Summary menu link to download a portable copy of your medical information. If you have questions, please visit the Frequently Asked Questions section of the Cellca website. Remember, Cellca is NOT to be used for urgent needs. For medical emergencies, dial 911. Now available from your iPhone and Android! Please provide this summary of care documentation to your next provider. Your primary care clinician is listed as Julee Hurt. If you have any questions after today's visit, please call [phone].

## 2018-07-02 NOTE — PROGRESS NOTES
Sebastian John is 80 y.o. female presents today for office visit for follow up for diabetes and hypertension. Pt would like to have blood sugar medication changed to something more affordable. Pt has not checked blood sugar this morning. Pt is not fasting. Pt is in Room# 2.    1. Have you been to the ER, urgent care clinic since your last visit? Hospitalized since your last visit? NO    2. Have you seen or consulted any other health care providers outside of the [address] [place] [address] since your last visit? Include any pap smears or colon screening. NO    Health Maintenance reviewed.       Upcoming Appts  NONE    Requested Prescriptions      No prescriptions requested or ordered in this encounter       Visit Vitals    /82 (BP 1 Location: Left arm, BP Patient Position: Sitting)  Comment (BP Patient Position): manual    Temp 99.1 °F (37.3 °C) (Oral)    Resp 18    Ht 4' 10\" (1.473 m)    Wt 134 lb (60.8 kg)    SpO2 98%    BMI 28.01 kg/m2

## 2018-07-02 NOTE — PROGRESS NOTES
Internal Medicine Progress Note    Today's Date:  2017   Patient:  Violet Patrick  Patient :  1932    Subjective:     Chief Complaint   Patient presents with    Diabetes    Hypertension     Hypertension   This is a chronic problem. BP is at goal.  Pt is on valsartan, chlorthalidone with potassium, norvasc and toprol. Pt reports compliance with these medications. BP log reviewed. Osteopenia  This is a chronic problem. This is stable. Pt is off fosamax. Pt was on fosamax for two years. Last dexa scan was in . Pt is on calcium/Vitamin D.      Diabetes mellitus  This is a chronic problem. This is controlled. Pt takes metformin. Pt is on aspirin and statin.  +microalbuminuria on an ARB. Januvia is cost prohibitive.      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 No pertinent surgical history. reports that she has never smoked. She has never used smokeless tobacco. She reports that she does not drink alcohol or use illicit drugs. Family History   Problem Relation Age of Onset    Hypertension Mother     Cancer Mother      pancreas    Cancer Father      lung     No Known Allergies  Review of Systems   Positives in bold  CV:      chest pain, palpitations  PULM:  SOB, wheezing, cough, sputum production    Current Outpatient Meds and Allergies     Current Outpatient Prescriptions on File Prior to Visit   Medication Sig Dispense Refill    valsartan-hydroCHLOROthiazide (DIOVAN-HCT) 320-25 mg per tablet Take 1 Tab by mouth daily.  pravastatin (PRAVACHOL) 40 mg tablet Take 40 mg by mouth nightly.       metoprolol succinate (TOPROL-XL) 25 mg XL tablet Take 1 Tab by mouth daily. 90 Tab 3    metFORMIN (GLUCOPHAGE) 500 mg tablet Take 1 Tab by mouth two (2) times daily (with meals). 180 Tab 3    glucose blood VI test strips (BLOOD GLUCOSE TEST) strip Check BS BID. Dx code: E11.65 200 Strip 3    calcium-cholecalciferol, D3, (CALCIUM 600 + D) tablet Take 1 Tab by mouth two (2) times a day. 180 Tab 3    aspirin delayed-release 81 mg tablet Take  by mouth daily. No current facility-administered medications on file prior to visit. No Known Allergies  Objective:     VS:    Visit Vitals    BP (!) 154/98 (BP 1 Location: Left arm, BP Patient Position: Sitting)  Comment (BP 1 Location): manual    Pulse 62    Temp 96.9 °F (36.1 °C) (Oral)    Resp 20    Ht 4' 11\" (1.499 m)    Wt 128 lb 3.2 oz (58.2 kg)    SpO2 100%    BMI 25.89 kg/m2     General:   Well-nourished, well-groomed, pleasant, alert, in no acute distress  Ears:  External ears WNL, TMs WNL  Eyes:  EOMI, PERRL  Nose:  External nares WNL  Psych:  No pressured speech, no abnormal thought content    PHQ over the last two weeks 7/12/2017   PHQ Not Done -   Little interest or pleasure in doing things Not at all   Feeling down, depressed or hopeless Not at all   Total Score PHQ 2 0     Office Visit on 07/02/2018   Component Date Value Ref Range Status    Hemoglobin A1c (POC) 07/02/2018 7.7  % Final     Assessment/Plan & Orders:         ICD-10-CM ICD-9-CM    1. Controlled type 2 diabetes mellitus with microalbuminuria, without long-term current use of insulin (HCC) E11.29 250.40 AMB POC HEMOGLOBIN A1C    R80.9 791.0 CBC WITH AUTOMATED DIFF      LIPID PANEL      METABOLIC PANEL, COMPREHENSIVE      TSH 3RD GENERATION      T4, FREE      HEMOGLOBIN A1C WITH EAG   2. Essential hypertension I10 401.9    3. White coat syndrome with hypertension I10 401.9    4. Overweight (BMI 25.0-29. 9) E66.3 278.02    5.  Vitamin D deficiency E55.9 268.9 VITAMIN D, 25 HYDROXY      Healthy lifestyle has been encouraged including avoidance of tobacco, limiting or avoiding alcohol intake, heart healthy diet which is low in cholesterol and saturated fat and contains fresh fruits, vegetables and whole grains and fiber, regular exercise with goals of 20-30 minutes 3-5 days weekly and maintaining an optimal BMI. Continue checking BP at home. Call us if not at goal of 150/90  Bring BP log to next appt  Information given on ketogenic/IF diet  Depression screenin2017    Follow-up Disposition:  Return in about 3 months (around 10/2/2018) for Follow up weight management, Follow up diabetes mellitus, Follow up hypertension. *Patient verbalized understanding and agreement with the plan. Patient was given an after-visit summary. Joon Rios.  [address], MD - Internal Medicine  2017, 11:20 AM  [facility]  [address] [place] [address] [place], [address]  Phone [phone]  Fax [phone]

## 2018-08-29 ENCOUNTER — TELEPHONE (OUTPATIENT)
Dept: FAMILY MEDICINE CLINIC | Facility: CLINIC | Age: 83
End: 2018-08-29

## 2018-08-29 DIAGNOSIS — I10 ESSENTIAL HYPERTENSION, BENIGN: Primary | ICD-10-CM

## 2018-08-29 RX ORDER — LOSARTAN POTASSIUM 50 MG/1
50 TABLET ORAL DAILY
Qty: 90 TAB | Refills: 3 | Status: SHIPPED | OUTPATIENT
Start: 2018-08-29 | End: 2019-02-22 | Stop reason: SDUPTHER

## 2018-08-29 NOTE — TELEPHONE ENCOUNTER
Left vm for pt to call office to inform her to stop taking valsartan 160 mg due to recall and start taking Losartan 50 mg and monitor blood pressure.

## 2018-10-03 ENCOUNTER — HOSPITAL ENCOUNTER (OUTPATIENT)
Dept: LAB | Age: 83
Discharge: HOME | End: 2018-10-03
Payer: MEDICARE

## 2018-10-03 ENCOUNTER — OFFICE VISIT (OUTPATIENT)
Dept: FAMILY MEDICINE CLINIC | Facility: CLINIC | Age: 83
End: 2018-10-03

## 2018-10-03 VITALS
DIASTOLIC BLOOD PRESSURE: 86 MMHG | TEMPERATURE: 98.2 F | HEART RATE: 66 BPM | OXYGEN SATURATION: 99 % | RESPIRATION RATE: 16 BRPM | WEIGHT: 136.2 LBS | BODY MASS INDEX: 28.59 KG/M2 | HEIGHT: 58 IN | SYSTOLIC BLOOD PRESSURE: 126 MMHG

## 2018-10-03 DIAGNOSIS — M85.80 OSTEOPENIA, UNSPECIFIED LOCATION: ICD-10-CM

## 2018-10-03 DIAGNOSIS — Z13.31 SCREENING FOR DEPRESSION: ICD-10-CM

## 2018-10-03 DIAGNOSIS — I10 ESSENTIAL HYPERTENSION: ICD-10-CM

## 2018-10-03 DIAGNOSIS — E55.9 VITAMIN D DEFICIENCY: ICD-10-CM

## 2018-10-03 DIAGNOSIS — R80.9 TYPE 2 DIABETES MELLITUS WITH MICROALBUMINURIA, UNSPECIFIED WHETHER LONG TERM INSULIN USE (HCC): ICD-10-CM

## 2018-10-03 DIAGNOSIS — E11.29 CONTROLLED TYPE 2 DIABETES MELLITUS WITH MICROALBUMINURIA, WITHOUT LONG-TERM CURRENT USE OF INSULIN (HCC): Primary | ICD-10-CM

## 2018-10-03 DIAGNOSIS — E11.29 CONTROLLED TYPE 2 DIABETES MELLITUS WITH MICROALBUMINURIA, WITHOUT LONG-TERM CURRENT USE OF INSULIN (HCC): ICD-10-CM

## 2018-10-03 DIAGNOSIS — R80.9 CONTROLLED TYPE 2 DIABETES MELLITUS WITH MICROALBUMINURIA, WITHOUT LONG-TERM CURRENT USE OF INSULIN (HCC): ICD-10-CM

## 2018-10-03 DIAGNOSIS — R80.9 CONTROLLED TYPE 2 DIABETES MELLITUS WITH MICROALBUMINURIA, WITHOUT LONG-TERM CURRENT USE OF INSULIN (HCC): Primary | ICD-10-CM

## 2018-10-03 DIAGNOSIS — R80.9 MICROALBUMINURIA: ICD-10-CM

## 2018-10-03 DIAGNOSIS — E11.29 TYPE 2 DIABETES MELLITUS WITH MICROALBUMINURIA, UNSPECIFIED WHETHER LONG TERM INSULIN USE (HCC): ICD-10-CM

## 2018-10-03 PROBLEM — G45.1 HEMISPHERIC CAROTID ARTERY SYNDROME: Status: ACTIVE | Noted: 2017-11-05

## 2018-10-03 PROBLEM — I63.311 CEREBROVASCULAR ACCIDENT (CVA) DUE TO THROMBOSIS OF RIGHT MIDDLE CEREBRAL ARTERY (HCC): Status: ACTIVE | Noted: 2018-10-03

## 2018-10-03 LAB
ALBUMIN SERPL-MCNC: 3.7 G/DL (ref 3.4–5)
ALBUMIN/GLOB SERPL: 1.1 {RATIO} (ref 0.8–1.7)
ALP SERPL-CCNC: 81 U/L (ref 45–117)
ALT SERPL-CCNC: 21 U/L (ref 13–56)
ANION GAP SERPL CALC-SCNC: 8 MMOL/L (ref 3–18)
AST SERPL-CCNC: 16 U/L (ref 15–37)
BASOPHILS # BLD: 0 K/UL (ref 0–0.1)
BASOPHILS NFR BLD: 0 % (ref 0–2)
BILIRUB SERPL-MCNC: 0.3 MG/DL (ref 0.2–1)
BUN SERPL-MCNC: 12 MG/DL (ref 7–18)
BUN/CREAT SERPL: 16 (ref 12–20)
CALCIUM SERPL-MCNC: 8.6 MG/DL (ref 8.5–10.1)
CHLORIDE SERPL-SCNC: 105 MMOL/L (ref 100–108)
CHOLEST SERPL-MCNC: 234 MG/DL
CO2 SERPL-SCNC: 27 MMOL/L (ref 21–32)
CREAT SERPL-MCNC: 0.76 MG/DL (ref 0.6–1.3)
DIFFERENTIAL METHOD BLD: ABNORMAL
EOSINOPHIL # BLD: 0.1 K/UL (ref 0–0.4)
EOSINOPHIL NFR BLD: 2 % (ref 0–5)
ERYTHROCYTE [DISTWIDTH] IN BLOOD BY AUTOMATED COUNT: 13.4 % (ref 11.6–14.5)
EST. AVERAGE GLUCOSE BLD GHB EST-MCNC: 169 MG/DL
GLOBULIN SER CALC-MCNC: 3.4 G/DL (ref 2–4)
GLUCOSE SERPL-MCNC: 121 MG/DL (ref 74–99)
HBA1C MFR BLD: 7.5 % (ref 4.2–5.6)
HCT VFR BLD AUTO: 37.8 % (ref 35–45)
HDLC SERPL-MCNC: 58 MG/DL (ref 40–60)
HDLC SERPL: 4 {RATIO} (ref 0–5)
HGB BLD-MCNC: 12.4 G/DL (ref 12–16)
LDLC SERPL CALC-MCNC: 152.2 MG/DL (ref 0–100)
LIPID PROFILE,FLP: ABNORMAL
LYMPHOCYTES # BLD: 2.7 K/UL (ref 0.9–3.6)
LYMPHOCYTES NFR BLD: 44 % (ref 21–52)
MCH RBC QN AUTO: 30.4 PG (ref 24–34)
MCHC RBC AUTO-ENTMCNC: 32.8 G/DL (ref 31–37)
MCV RBC AUTO: 92.6 FL (ref 74–97)
MONOCYTES # BLD: 0.5 K/UL (ref 0.05–1.2)
MONOCYTES NFR BLD: 8 % (ref 3–10)
NEUTS SEG # BLD: 2.8 K/UL (ref 1.8–8)
NEUTS SEG NFR BLD: 46 % (ref 40–73)
PLATELET # BLD AUTO: 260 K/UL (ref 135–420)
PMV BLD AUTO: 12.5 FL (ref 9.2–11.8)
POTASSIUM SERPL-SCNC: 3.8 MMOL/L (ref 3.5–5.5)
PROT SERPL-MCNC: 7.1 G/DL (ref 6.4–8.2)
RBC # BLD AUTO: 4.08 M/UL (ref 4.2–5.3)
SODIUM SERPL-SCNC: 140 MMOL/L (ref 136–145)
T4 FREE SERPL-MCNC: 0.9 NG/DL (ref 0.7–1.5)
TRIGL SERPL-MCNC: 119 MG/DL (ref ?–150)
TSH SERPL DL<=0.05 MIU/L-ACNC: 2.1 UIU/ML (ref 0.36–3.74)
VLDLC SERPL CALC-MCNC: 23.8 MG/DL
WBC # BLD AUTO: 6.2 K/UL (ref 4.6–13.2)

## 2018-10-03 PROCEDURE — 84439 ASSAY OF FREE THYROXINE: CPT | Performed by: INTERNAL MEDICINE

## 2018-10-03 PROCEDURE — 84443 ASSAY THYROID STIM HORMONE: CPT | Performed by: INTERNAL MEDICINE

## 2018-10-03 PROCEDURE — 85025 COMPLETE CBC W/AUTO DIFF WBC: CPT | Performed by: INTERNAL MEDICINE

## 2018-10-03 PROCEDURE — 80053 COMPREHEN METABOLIC PANEL: CPT | Performed by: INTERNAL MEDICINE

## 2018-10-03 PROCEDURE — 82306 VITAMIN D 25 HYDROXY: CPT | Performed by: INTERNAL MEDICINE

## 2018-10-03 PROCEDURE — 80061 LIPID PANEL: CPT | Performed by: INTERNAL MEDICINE

## 2018-10-03 PROCEDURE — 83036 HEMOGLOBIN GLYCOSYLATED A1C: CPT | Performed by: INTERNAL MEDICINE

## 2018-10-03 RX ORDER — CHLORTHALIDONE 25 MG/1
TABLET ORAL DAILY
COMMUNITY
End: 2019-04-29

## 2018-10-03 RX ORDER — AMLODIPINE BESYLATE 10 MG/1
TABLET ORAL
Qty: 90 TAB | Refills: 3 | Status: SHIPPED | OUTPATIENT
Start: 2018-10-03 | End: 2019-02-04 | Stop reason: SDUPTHER

## 2018-10-03 NOTE — MR AVS SNAPSHOT
[address] [address] 
[phone] Patient: Mariaelena Holcomb MRN: [id number] Visit Information Date & Time Provider Department Dept. Phone Encounter #  
 10/3/2018 10:30 AM Treva Varghese MD Formerly [facility] [phone] [id number] Follow-up Instructions Return in about 3 months (around 1/3/2019) for Go over lab/imaging results, Follow up weight management, Follow up hypertension, Follow up diabetes. Your Appointments 10/3/2018 10:30 AM  
Follow Up with Treva Varghese MD  
[facility]) Appt Note: Return in about 3 months (around 10/2/2018) for Follow up weight management, Follow up diabetes mellitus, Follow up hypertension. [address] [place] [address] [phone]  
[address] [id number] Upcoming Health Maintenance Date Due Shingrix Vaccine Age 50> (1 of 2) 12/14/1982 FOOT EXAM Q1 10/25/2018 Influenza Age 5 to Adult 3/31/2019* MEDICARE YEARLY EXAM 10/26/2018 LIPID PANEL Q1 11/20/2018 HEMOGLOBIN A1C Q6M 1/2/2019 EYE EXAM RETINAL OR DILATED Q1 7/25/2019 GLAUCOMA SCREENING Q2Y 7/25/2020 DTaP/Tdap/Td series (2 - Td) 12/10/2020 *Topic was postponed. The date shown is not the original due date. Allergies as of 10/3/2018  Review Complete On: 10/3/2018 By: Treva Varghese MD  
 No Known Allergies Current Immunizations  Never Reviewed Name Date Influenza Vaccine 10/15/2015, 12/31/2013, 1/2/2013, 11/2/2009 Influenza Vaccine (Quad) PF 10/25/2017  9:00 AM, 10/3/2016 Pneumococcal Polysaccharide (PPSV-23) 11/1/2016 12:00 AM  
 Pneumococcal Vaccine (Unspecified Type) 11/2/2009 Tdap 12/10/2010 Not reviewed this visit You Were Diagnosed With   
  
 Codes Comments  Controlled type 2 diabetes mellitus with microalbuminuria, without long-term current use of insulin (HCC)    -  Primary ICD-10-CM: E11.29, R80.9 ICD-9-CM: 250.40, 791.0 Essential hypertension     ICD-10-CM: I10 
ICD-9-CM: 401.9 Type 2 diabetes mellitus with microalbuminuria, unspecified whether long term insulin use (HCC)     ICD-10-CM: E11.29, R80.9 ICD-9-CM: 250.40, 791.0 Vitamin D deficiency     ICD-10-CM: E55.9 ICD-9-CM: 268.9 Microalbuminuria     ICD-10-CM: R80.9 ICD-9-CM: 791.0 Osteopenia, unspecified location     ICD-10-CM: M85.80 ICD-9-CM: 733.90 Screening for depression     ICD-10-CM: Z13.31 
ICD-9-CM: V79.0 Vitals BP Pulse Temp Resp Height(growth percentile) Weight(growth percentile) 126/86 (BP 1 Location: Left arm, BP Patient Position: Sitting) 66 98.2 °F (36.8 °C) (Oral) 16 4' 10\" (1.473 m) 136 lb 3.2 oz (61.8 kg) SpO2 BMI OB Status Smoking Status 99% 28.47 kg/m2 Menopause Never Smoker BMI and BSA Data Body Mass Index Body Surface Area  
 28.47 kg/m 2 1.59 m 2 Preferred Pharmacy Pharmacy Name Phone [id number] [phone] [phone] Your Updated Medication List  
  
   
This list is accurate as of 10/3/18 10:21 AM.  Always use your most recent med list. amLODIPine 10 mg tablet Commonly known as:  [place] TAKE 1 TABLET BY MOUTH ONCE A DAY  
  
 aspirin 81 mg chewable tablet CHEW 1 TABLET BY MOUTH ONCE A DAY  
  
 atorvastatin 80 mg tablet Commonly known as:  LIPITOR Take 80 mg by mouth nightly. calcium-cholecalciferol (D3) tablet Commonly known as:  Calcium 600 + D Take 1 Tab by mouth two (2) times a day. chlorthalidone 25 mg tablet Commonly known as:  Larabdifatah Lozoyaey Take  by mouth daily. glucose blood VI test strips strip Commonly known as:  blood glucose test  
Check BS BID. Dx code: E11.65  
  
 losartan 50 mg tablet Commonly known as:  COZAAR Take 1 Tab by mouth daily. metFORMIN  mg tablet Commonly known as:  GLUCOPHAGE XR Take 1 Tab by mouth daily. metoprolol succinate 25 mg XL tablet Commonly known as:  TOPROL-XL Take 1 Tab by mouth daily. potassium chloride 20 mEq packet Commonly known as:  KLOR-CON Take 1 Packet by mouth every other day. Prescriptions Sent to Pharmacy Refills  
 amLODIPine (NORVASC) 10 mg tablet 3 Sig: TAKE 1 TABLET BY MOUTH ONCE A DAY Class: Normal  
 Pharmacy: [address] [place] [address], [address] [place] [address], 29  #: [phone] We Performed the Following [facility] [[id number] [facility]]  DIABETES FOOT EXAM [7 Custom] Follow-up Instructions Return in about 3 months (around 1/3/2019) for Go over lab/imaging results, Follow up weight management, Follow up hypertension, Follow up diabetes. To-Do List   
 10/03/2018 Imaging:  DEXA BONE DENSITY STUDY AXIAL Patient Instructions Medicare Wellness Visit, Female The best way to live healthy is to have a lifestyle where you eat a well-balanced diet, exercise regularly, limit alcohol use, and quit all forms of tobacco/nicotine, if applicable. Regular preventive services are another way to keep healthy. Preventive services (vaccines, screening tests, monitoring & exams) can help personalize your care plan, which helps you manage your own care. Screening tests can find health problems at the earliest stages, when they are easiest to treat. [facility] follows the current, evidence-based guidelines published by the [facility] Javi Gabriel (USPSTF) when recommending preventive services for our patients. Because we follow these guidelines, sometimes recommendations change over time as research supports it. (For example, mammograms used to be recommended annually.  Even though Medicare will still pay for an annual mammogram, the newer guidelines recommend a mammogram every two years for women of average risk.) Of course, you and your doctor may decide to screen more often for some diseases, based on your risk and your health status. Preventive services for you include: - Medicare offers their members a free annual wellness visit, which is time for you and your primary care provider to discuss and plan for your preventive service needs. Take advantage of this benefit every year! 
-All adults over the age of 72 should receive the recommended pneumonia vaccines. Current USPSTF guidelines recommend a series of two vaccines for the best pneumonia protection.  
-All adults should have a flu vaccine yearly and a tetanus vaccine every 10 years. All adults age 61 and older should receive a shingles vaccine once in their lifetime.   
-A bone mass density test is recommended when a woman turns 65 to screen for osteoporosis. This test is only recommended one time, as a screening. Some providers will use this same test as a disease monitoring tool if you already have osteoporosis. -All adults age 38-68 who are overweight should have a diabetes screening test once every three years.  
-Other screening tests and preventive services for persons with diabetes include: an eye exam to screen for diabetic retinopathy, a kidney function test, a foot exam, and stricter control over your cholesterol.  
-Cardiovascular screening for adults with routine risk involves an electrocardiogram (ECG) at intervals determined by your doctor.  
-Colorectal cancer screenings should be done for adults age 54-65 with no increased risk factors for colorectal cancer. There are a number of acceptable methods of screening for this type of cancer. Each test has its own benefits and drawbacks. Discuss with your doctor what is most appropriate for you during your annual wellness visit.  The different tests include: colonoscopy (considered the best screening method), a fecal occult blood test, a fecal DNA test, and sigmoidoscopy. -Breast cancer screenings are recommended every other year for women of normal risk, age 54-69. 
-Cervical cancer screenings for women over age 72 are only recommended with certain risk factors.  
-All adults born between [facility] should be screened once for Hepatitis C. Here is a list of your current Health Maintenance items (your personalized list of preventive services) with a due date: 
Health Maintenance Due Topic Date Due  Shingles Vaccine (1 of 2) 12/14/1982 [facility] Diabetic Foot Care  10/25/2018 Introducing [facility]! [employer] introduces "[employer] patient portal. Now you can access parts of your medical record, email your doctor's office, and request medication refills online. 1. In your internet browser, go to [email or web address] 2. Click on the First Time User? Click Here link in the Sign In box. You will see the New Member Sign Up page. 3. Enter your "[employer] Access Code exactly as it appears below. You will not need to use this code after youve completed the sign-up process. If you do not sign up before the expiration date, you must request a new code. · "[employer] Access Code: [email or web address] Expires: 1/1/2019 10:21 AM 
 
4. Enter the last four digits of your Social Security Number (xxxx) and Date of Birth (mm/dd/yyyy) as indicated and click Submit. You will be taken to the next sign-up page. 5. Create a "[employer] ID. This will be your "[employer] login ID and cannot be changed, so think of one that is secure and easy to remember. 6. Create a "[employer] password. You can change your password at any time. 7. Enter your Password Reset Question and Answer. This can be used at a later time if you forget your password. 8. Enter your e-mail address. You will receive e-mail notification when new information is available in [address] 9. Click Sign Up. You can now view and download portions of your medical record. 10. Click the Download Summary menu link to download a portable copy of your medical information. If you have questions, please visit the Frequently Asked Questions section of the [employer] website. Remember, [employer] is NOT to be used for urgent needs. For medical emergencies, dial 911. Now available from your iPhone and Android! Please provide this summary of care documentation to your next provider. Your primary care clinician is listed as Gena Nixon. If you have any questions after today's visit, please call [phone].

## 2018-10-03 NOTE — PROGRESS NOTES
Internal Medicine Progress Note Today's Date:  2017 Patient:  Lorin Sidhu Patient :  [id number] Subjective: Chief Complaint Patient presents with  Diabetes  Weight Management  Hypertension  Medication Refill Hypertension This is a chronic problem. BP is at goal.  Pt is on losartan, chlorthalidone with potassium, norvasc and toprol. Pt reports compliance with these medications. Osteopenia This is a chronic problem. This is stable. Pt is off fosamax. Pt was on fosamax for two years. Last dexa scan was in . Pt is on calcium/Vitamin D.  
  
Diabetes mellitus This is a chronic problem. This is controlled. Pt takes metformin. Pt is on aspirin and statin.  +microalbuminuria on an ARB. Henreitta Rodkimberly is cost prohibitive. Information given on ketogenic/IF diet during a previous visit 
  
Past Medical History:  
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 No pertinent surgical history. reports that she has never smoked. She has never used smokeless tobacco. She reports that she does not drink alcohol or use illicit drugs. Family History Problem Relation Age of Onset  Hypertension Mother  Cancer Mother   
  pancreas  Cancer Father   
  lung No Known Allergies Review of Systems Positives in bold CV:      chest pain, palpitations PULM:  SOB, wheezing, cough, sputum production Current Outpatient Meds and Allergies Current Outpatient Prescriptions on File Prior to Visit Medication Sig Dispense Refill  valsartan-hydroCHLOROthiazide (DIOVAN-HCT) 320-25 mg per tablet Take 1 Tab by mouth daily.  pravastatin (PRAVACHOL) 40 mg tablet Take 40 mg by mouth nightly.  metoprolol succinate (TOPROL-XL) 25 mg XL tablet Take 1 Tab by mouth daily. 90 Tab 3  
 metFORMIN (GLUCOPHAGE) 500 mg tablet Take 1 Tab by mouth two (2) times daily (with meals). 180 Tab 3  
 glucose blood VI test strips (BLOOD GLUCOSE TEST) strip Check BS BID. Dx code: E11.65 200 Strip 3  
 calcium-cholecalciferol, D3, (CALCIUM 600 + D) tablet Take 1 Tab by mouth two (2) times a day. 180 Tab 3  
 aspirin delayed-release 81 mg tablet Take  by mouth daily. No current facility-administered medications on file prior to visit. No Known Allergies Objective:    
VS:   
Visit Vitals  BP (!) 154/98 (BP 1 Location: Left arm, BP Patient Position: Sitting) Comment (BP 1 Location): manual  
 Pulse 62  Temp 96.9 °F (36.1 °C) (Oral)  Resp 20  
 Ht 4' 11\" (1.499 m)  Wt 128 lb 3.2 oz (58.2 kg)  SpO2 100%  BMI 25.89 kg/m2 General:   Well-nourished, well-groomed, pleasant, alert, in no acute distress Ears:  External ears WNL, TMs WNL Eyes:  EOMI, PERRL Nose:  External nares WNL Psych:  No pressured speech, no abnormal thought content Diabetic foot exam: monofilament exam normal, no open lesions or or erythema PHQ over the last two weeks 10/3/2018 PHQ Not Done - Little interest or pleasure in doing things Not at all Feeling down, depressed, irritable, or hopeless Not at all Total Score PHQ 2 0 No visits with results within 3 Month(s) from this visit. Latest known visit with results is: 
 
Office Visit on 07/02/2018 Component Date Value Ref Range Status  Hemoglobin A1c (POC) 07/02/2018 7.7  % Final  
 
Assessment/Plan & Orders: ICD-10-CM ICD-9-CM 1. Controlled type 2 diabetes mellitus with microalbuminuria, without long-term current use of insulin (HCC) E11.29 250.40  DIABETES FOOT EXAM  
 R80.9 791.0 2. Essential hypertension I10 401.9 amLODIPine (NORVASC) 10 mg tablet 3. Type 2 diabetes mellitus with microalbuminuria, unspecified whether long term insulin use (HCC) E11.29 250.40   
 R80.9 791.0   
4. Vitamin D deficiency E55.9 268.9   
5. Microalbuminuria R80.9 791.0   
6. Osteopenia, unspecified location M85.80 733.90 DEXA BONE DENSITY STUDY AXIAL 7. Screening for depression Z13.31 V79.0 [place] [address] Healthy lifestyle has been encouraged including avoidance of tobacco, limiting or avoiding alcohol intake, heart healthy diet which is low in cholesterol and saturated fat and contains fresh fruits, vegetables and whole grains and fiber, regular exercise with goals of 20-30 minutes 3-5 days weekly and maintaining an optimal BMI. Continue checking BP at home. Call us if not at goal of 150/90 Bring BP log to next appt Pt to get high dose flu shot Depression screening: 10/3/18 Follow-up Disposition: 
Return in about 3 months (around 1/3/2019) for Go over lab/imaging results, Follow up weight management, Follow up hypertension, Follow up diabetes. *Patient verbalized understanding and agreement with the plan. Patient was given an after-visit summary. Cleveland Cummings. Agustin Peña MD - Internal Medicine 9/27/2017, 11:20 AM 
[address], [address] Phone [phone] Fax [phone]

## 2018-10-03 NOTE — PATIENT INSTRUCTIONS
Medicare Wellness Visit, Female The best way to live healthy is to have a lifestyle where you eat a well-balanced diet, exercise regularly, limit alcohol use, and quit all forms of tobacco/nicotine, if applicable. Regular preventive services are another way to keep healthy. Preventive services (vaccines, screening tests, monitoring & exams) can help personalize your care plan, which helps you manage your own care. Screening tests can find health problems at the earliest stages, when they are easiest to treat. [facility] follows the current, evidence-based guidelines published by the [facility] Javi Gabriel ([facility]) when recommending preventive services for our patients. Because we follow these guidelines, sometimes recommendations change over time as research supports it. (For example, mammograms used to be recommended annually. Even though Medicare will still pay for an annual mammogram, the newer guidelines recommend a mammogram every two years for women of average risk.) Of course, you and your doctor may decide to screen more often for some diseases, based on your risk and your health status. Preventive services for you include: - Medicare offers their members a free annual wellness visit, which is time for you and your primary care provider to discuss and plan for your preventive service needs. Take advantage of this benefit every year! 
-All adults over the age of 72 should receive the recommended pneumonia vaccines. Current USPSTF guidelines recommend a series of two vaccines for the best pneumonia protection.  
-All adults should have a flu vaccine yearly and a tetanus vaccine every 10 years. All adults age 61 and older should receive a shingles vaccine once in their lifetime.   
-A bone mass density test is recommended when a woman turns 65 to screen for osteoporosis. This test is only recommended one time, as a screening. Some providers will use this same test as a disease monitoring tool if you already have osteoporosis. -All adults age 38-68 who are overweight should have a diabetes screening test once every three years.  
-Other screening tests and preventive services for persons with diabetes include: an eye exam to screen for diabetic retinopathy, a kidney function test, a foot exam, and stricter control over your cholesterol.  
-Cardiovascular screening for adults with routine risk involves an electrocardiogram (ECG) at intervals determined by your doctor.  
-Colorectal cancer screenings should be done for adults age 54-65 with no increased risk factors for colorectal cancer. There are a number of acceptable methods of screening for this type of cancer. Each test has its own benefits and drawbacks. Discuss with your doctor what is most appropriate for you during your annual wellness visit. The different tests include: colonoscopy (considered the best screening method), a fecal occult blood test, a fecal DNA test, and sigmoidoscopy. -Breast cancer screenings are recommended every other year for women of normal risk, age 54-69. 
-Cervical cancer screenings for women over age 72 are only recommended with certain risk factors.  
-All adults born between [facility] should be screened once for Hepatitis C. Here is a list of your current Health Maintenance items (your personalized list of preventive services) with a due date: 
Health Maintenance Due Topic Date Due  Shingles Vaccine (1 of 2) 12/14[birth year]1982 [facility] Diabetic Foot Care  10/25/2018

## 2018-10-03 NOTE — PROGRESS NOTES
Alexsandra Gambino is 80 y.o. female presents today for office visit for follow up for diabetes and hypertension. Pt is fasting. Pt is in Room# 2.  
 
1. Have you been to the ER, urgent care clinic since your last visit? Hospitalized since your last visit? no 
 
2. Have you seen or consulted any other health care providers outside of the [address] [place] [address] since your last visit? Include any pap smears or colon screening. no 
 
Health Maintenance reviewed and pt reminded to obtain high-dose flu vaccine from pharmacy. Upcoming Appts 
no Requested Prescriptions Pending Prescriptions Disp Refills  amLODIPine (NORVASC) 10 mg tablet 90 Tab 3 Sig: TAKE 1 TABLET BY MOUTH ONCE A DAY Visit Vitals  /86 (BP 1 Location: Left arm, BP Patient Position: Sitting) Comment (BP Patient Position): manual  
 Pulse 66  Temp 98.2 °F (36.8 °C) (Oral)  Resp 16  
 Ht 4' 10\" (1.473 m)  Wt 136 lb 3.2 oz (61.8 kg)  SpO2 99%  BMI 28.47 kg/m2

## 2018-10-04 LAB — 25(OH)D3 SERPL-MCNC: 31.2 NG/ML (ref 30–100)

## 2018-10-30 ENCOUNTER — HOSPITAL ENCOUNTER (OUTPATIENT)
Dept: GENERAL RADIOLOGY | Age: 83
Discharge: HOME | End: 2018-10-30
Attending: INTERNAL MEDICINE
Payer: MEDICARE

## 2018-10-30 DIAGNOSIS — M85.80 OSTEOPENIA, UNSPECIFIED LOCATION: ICD-10-CM

## 2018-10-30 PROCEDURE — 77080 DXA BONE DENSITY AXIAL: CPT

## 2018-11-04 NOTE — PROGRESS NOTES
Result reviewed. LPN to call pt with results. Bone density test shows osteopenia.  Keep taking calcium/VIt D.

## 2018-11-06 ENCOUNTER — TELEPHONE (OUTPATIENT)
Dept: FAMILY MEDICINE CLINIC | Facility: CLINIC | Age: 83
End: 2018-11-06

## 2018-11-06 NOTE — TELEPHONE ENCOUNTER
Called pt and left message. Call back number left and I myself or one of the other nurses will attempt to contact again. The call was to inform pt of Dexa Scan results and to schedule a MWV.

## 2018-11-15 NOTE — TELEPHONE ENCOUNTER
Spoke with pt in regards to Dexa scan results and MWV is due. Two pt identifier's verified and relayed 's notes. Pt acknowledges understanding and states she would like to discuss the results further with the PCP directly. Pt is scheduled for [address] [place] [address] on 11/20/2018. Pt  voices no concerns at this time.

## 2018-11-16 ENCOUNTER — APPOINTMENT (OUTPATIENT)
Dept: CT IMAGING | Age: 83
End: 2018-11-16
Attending: EMERGENCY MEDICINE
Payer: MEDICARE

## 2018-11-16 ENCOUNTER — HOSPITAL ENCOUNTER (EMERGENCY)
Age: 83
Discharge: HOME | End: 2018-11-16
Attending: EMERGENCY MEDICINE
Payer: MEDICARE

## 2018-11-16 VITALS
RESPIRATION RATE: 17 BRPM | WEIGHT: 134 LBS | TEMPERATURE: 97.9 F | DIASTOLIC BLOOD PRESSURE: 67 MMHG | HEART RATE: 60 BPM | BODY MASS INDEX: 27.01 KG/M2 | OXYGEN SATURATION: 99 % | HEIGHT: 59 IN | SYSTOLIC BLOOD PRESSURE: 175 MMHG

## 2018-11-16 DIAGNOSIS — S00.83XA CONTUSION OF FOREHEAD, INITIAL ENCOUNTER: ICD-10-CM
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
## 2018-11-16 PROCEDURE — 99283 EMERGENCY DEPT VISIT LOW MDM: CPT

## 2018-11-16 PROCEDURE — 90715 TDAP VACCINE 7 YRS/> IM: CPT | Performed by: EMERGENCY MEDICINE

## 2018-11-16 PROCEDURE — 70450 CT HEAD/BRAIN W/O DYE: CPT

## 2018-11-16 PROCEDURE — 90471 IMMUNIZATION ADMIN: CPT

## 2018-11-16 PROCEDURE — 74011250636 HC RX REV CODE- 250/636: Performed by: EMERGENCY MEDICINE

## 2018-11-16 PROCEDURE — 74011250637 HC RX REV CODE- 250/637: Performed by: EMERGENCY MEDICINE

## 2018-11-16 RX ORDER — ACETAMINOPHEN 500 MG
1000 TABLET ORAL ONCE
Status: COMPLETED | OUTPATIENT
Start: 2018-11-16 | End: 2018-11-16

## 2018-11-16 RX ADMIN — TETANUS TOXOID, REDUCED DIPHTHERIA TOXOID AND ACELLULAR PERTUSSIS VACCINE, ADSORBED 0.5 ML: 5; 2.5; 8; 8; 2.5 SUSPENSION INTRAMUSCULAR at 10:14

## 2018-11-16 RX ADMIN — ACETAMINOPHEN 1000 MG: 500 TABLET, FILM COATED ORAL at 09:17

## 2018-11-16 NOTE — ED PROVIDER NOTES
EMERGENCY DEPARTMENT HISTORY AND PHYSICAL EXAM 
 
8:41 AM 
 
 
Date: 11/16/2018 Patient Name: [facility] History of Presenting Illness Chief Complaint Patient presents with  Fall History Provided By: Patient and Patient's Daughter Additional History (Context): [facility] is a 80 y.o. female with Osteoporosis,and HTN  who presents with an acute 4/10 headache secondary to a fall onset 7:30AM today. Patient reports she tripped on a rug, and hit her head on the wall. Patient denies LOC. Denies other associated symptoms, such as neck pain, arthralgias (shoulder pain, knee pain), chest pain, shortness of breath, abdominal pain, vision changes, N/V. Notes to be on Aspirin. Patient's daughter reports the patient lives with the daughter. Reports patient's hx of similar falls. Patient denies the use of ambulatory assistance. When asked about her HTN, the patient reports taking her medications this morning, including her HTN medication. No other concerns were expressed at this time. PCP: Lora Aguero MD 
 
Current Outpatient Medications Medication Sig Dispense Refill  metoprolol succinate (TOPROL-XL) 25 mg XL tablet TAKE 1 TABLET BY MOUTH DAILY. 90 Tab 3  
 amLODIPine (NORVASC) 10 mg tablet TAKE 1 TABLET BY MOUTH ONCE A DAY 90 Tab 3  chlorthalidone (HYGROTEN) 25 mg tablet Take  by mouth daily.  losartan (COZAAR) 50 mg tablet Take 1 Tab by mouth daily. 90 Tab 3  potassium chloride (KLOR-CON) 20 mEq packet Take 1 Packet by mouth every other day. 100 Each 3  
 aspirin 81 mg chewable tablet CHEW 1 TABLET BY MOUTH ONCE A DAY  3  
 metFORMIN ER (GLUCOPHAGE XR) 750 mg tablet Take 1 Tab by mouth daily. 90 Tab 3  
 atorvastatin (LIPITOR) 80 mg tablet Take 80 mg by mouth nightly.  glucose blood VI test strips (BLOOD GLUCOSE TEST) strip Check BS BID.  Dx code: E11.65 200 Strip 3  
 calcium-cholecalciferol, D3, (CALCIUM 600 + D) tablet Take 1 Tab by mouth two (2) times a day. 180 Tab 3 Past History Past Medical History: 
Past Medical History:  
Diagnosis Date  Advance directive discussed with patient 10/12/2016  Cigarette nicotine dependence in remission 10/12/2016  Dizziness 3/16/2017  Essential hypertension 10/3/2016  Microalbuminuria 1/18/2017  Osteopenia 10/3/2016  Osteoporosis  Overweight (BMI 25.0-29.9) 5/10/2018  Pure hypercholesterolemia 1/18/2017  Type II diabetes mellitus ([address] 10/3/2016  Vitamin D deficiency 1/18/2017  White coat syndrome with hypertension 3/27/2018 Past Surgical History: 
History reviewed. No pertinent surgical history. Family History: 
Family History Problem Relation Age of Onset  Hypertension Mother  Cancer Mother   
     pancreas  Cancer Father   
     lung Social History: 
Social History Tobacco Use  Smoking status: Never Smoker  Smokeless tobacco: Never Used Substance Use Topics  Alcohol use: No  
 Drug use: No  
 
 
Allergies: 
No Known Allergies Review of Systems Review of Systems Eyes: Negative for visual disturbance. Respiratory: Negative for shortness of breath. Cardiovascular: Negative for chest pain. Musculoskeletal: Negative for arthralgias and neck pain. Neurological: Positive for headaches. All other systems reviewed and are negative. Physical Exam  
 
Visit Vitals BP (!) 237/81 (BP 1 Location: Right arm, BP Patient Position: At rest) Pulse 60 Temp 97.9 °F (36.6 °C) Resp 17 Ht 4' 11\" (1.499 m) Wt 60.8 kg (134 lb) SpO2 100% BMI 27.06 kg/m² Physical Exam  
Constitutional: She is oriented to person, place, and time. She appears well-developed and well-nourished. No distress. HENT:  
Head: Normocephalic and atraumatic. Head is without Davis's sign.   
Mouth/Throat: Mucous membranes are normal.  
Left ear and right ear: large amount of cerumen, but  appears to otherwise be clear Eyes: Pupils are equal, round, and reactive to light. Pinpoint pupils No periorbital ecchymosis Neck: Normal range of motion. Neck supple. Cardiovascular: Normal rate, regular rhythm and intact distal pulses. Murmur heard. 3/6 crescendo murmur with a loud S2  
Pulmonary/Chest: Effort normal. No respiratory distress. Coarse breath sounds Abdominal: Soft. Bowel sounds are normal. She exhibits no distension. There is no tenderness. Musculoskeletal: Normal range of motion. No Midline C-spine tenderness No L-spine or T-spine tenderness No tenderness in the shoulders Lymphadenopathy:  
No lymphadenopathy. Neurological: She is alert and oriented to person, place, and time. She has normal strength. No cranial nerve deficit or sensory deficit. Sensation intact x 4 extremities Skin: Skin is warm and dry. Nursing note and vitals reviewed. Diagnostic Study Results Labs - No results found for this or any previous visit (from the past 12 hour(s)). Radiologic Studies -  
CT HEAD WO CONT Final Result Medical Decision Making I am the first provider for this patient. I reviewed the vital signs, available nursing notes, past medical history, past surgical history, family history and social history. Vital Signs-Reviewed the patient's vital signs. Records Reviewed: Nursing Notes and Triage notes Provider Notes (Medical Decision Making): MDM Number of Diagnoses or Management Options Contusion of forehead, initial encounter:  
Fall from ground level:  
Diagnosis management comments: Diff dx: ICH, contusion, hematoma Pt had GLF with trauma to head, brief moment of confusion, no LOC, no seizure activity, mechanical fall caused by rug (has happened in past, daughter will remove). On asa making her risk of bleed ~5%, will scan although at baseline now. Nexus negative, no need for c spine.  Mechanical fall, no need for ekg or further worup, no other complaints. HTN at triage, now down to 190s, will monitor 9:47 AM 
CT head neg for new bleeds, old infarcts noted, no concern for cva today. BP still elevated, pt made aware, per ACEP no need to treat asymptomatic HTN in ED, counseled her on coming back to ED for new headache, LOC, persistent vomiting. Safe for d/c, careful return precautions given. Pt/family comfortable with plan Louann Gaxiola MD 
 
 
 
Diagnosis Clinical Impression: 1. Fall from ground level 2. Contusion of forehead, initial encounter Disposition: home Follow-up Information Follow up With Specialties Details Why Contact Info Zhen Gaytan MD Internal Medicine In 3 days  [address] [facility] [address] [id number] [phone] [facility] EMERGENCY DEPT Emergency Medicine  As needed, If symptoms worsen [address] 
[phone] Medication List  
  
CONTINUE taking these medications   
amLODIPine 10 mg tablet Commonly known as:  Carlus [place] TAKE 1 TABLET BY MOUTH ONCE A DAY 
  
aspirin 81 mg chewable tablet 
  
atorvastatin 80 mg tablet Commonly known as:  LIPITOR 
  
calcium-cholecalciferol (D3) tablet Commonly known as:  Calcium 600 + D Take 1 Tab by mouth two (2) times a day. chlorthalidone 25 mg tablet Commonly known as:  HYGROTEN 
  
glucose blood VI test strips strip Commonly known as:  blood glucose test 
Check BS BID. Dx code: E11.65 
  
losartan 50 mg tablet Commonly known as:  COZAAR Take 1 Tab by mouth daily. metFORMIN  mg tablet Commonly known as:  GLUCOPHAGE XR Take 1 Tab by mouth daily. metoprolol succinate 25 mg XL tablet Commonly known as:  TOPROL-XL 
TAKE 1 TABLET BY MOUTH DAILY. potassium chloride 20 mEq packet Commonly known as:  KLOR-CON Take 1 Packet by mouth every other day. 
  
  
 
_______________________________ Attestations: Scribe Attestation Manuela Rued acting as a scribe for and in the presence of Rachel Castelan MD     
November 16, 2018 at 8:41 AM 
    
Provider Attestation:     
I personally performed the services described in the documentation, reviewed the documentation, as recorded by the scribe in my presence, and it accurately and completely records my words and actions. November 16, 2018 at 8:41 AM - Rachel Castelan MD   
_______________________________

## 2018-11-16 NOTE — ED NOTES
Pt and family member ambulated to ct waiting area. Wheelchair declined.  June ct tech aware of pt waiting for test.

## 2018-11-16 NOTE — DISCHARGE INSTRUCTIONS
Head Injury: Care Instructions  Your Care Instructions    Most injuries to the head are minor. Bumps, cuts, and scrapes on the head and face usually heal well and can be treated the same as injuries to other parts of the body. Although it's rare, once in a while a more serious problem shows up after you are home. So it's good to be on the lookout for symptoms for a day or two. Follow-up care is a key part of your treatment and safety. Be sure to make and go to all appointments, and call your doctor if you are having problems. It's also a good idea to know your test results and keep a list of the medicines you take. How can you care for yourself at home? · Follow your doctor's instructions. He or she will tell you if you need someone to watch you closely for the next 24 hours or longer. · Take it easy for the next few days or more if you are not feeling well. · Ask your doctor when it's okay for you to go back to activities like driving a car, riding a bike, or operating machinery. When should you call for help? Call 911 anytime you think you may need emergency care. For example, call if:    · You have a seizure.     · You passed out (lost consciousness).     · You are confused or can't stay awake.    Call your doctor now or seek immediate medical care if:    · You have new or worse vomiting.     · You feel less alert.     · You have new weakness or numbness in any part of your body.    Watch closely for changes in your health, and be sure to contact your doctor if:    · You do not get better as expected.     · You have new symptoms, such as headaches, trouble concentrating, or changes in mood. Where can you learn more? Go to http://shashank-louie.info/. Enter Z019 in the search box to learn more about \"Head Injury: Care Instructions. \"  Current as of: June 4, 2018  Content Version: 11.8  © 7579-4012 [employer].  Care instructions adapted under license by [employer] (which disclaims liability or warranty for this information). If you have questions about a medical condition or this instruction, always ask your healthcare professional. [address] any warranty or liability for your use of this information.

## 2018-11-20 ENCOUNTER — OFFICE VISIT (OUTPATIENT)
Dept: FAMILY MEDICINE CLINIC | Facility: CLINIC | Age: 83
End: 2018-11-20

## 2018-11-20 ENCOUNTER — TELEPHONE (OUTPATIENT)
Dept: FAMILY MEDICINE CLINIC | Facility: CLINIC | Age: 83
End: 2018-11-20

## 2018-11-20 VITALS
WEIGHT: 135.8 LBS | RESPIRATION RATE: 14 BRPM | BODY MASS INDEX: 28.51 KG/M2 | HEIGHT: 58 IN | SYSTOLIC BLOOD PRESSURE: 160 MMHG | HEART RATE: 54 BPM | TEMPERATURE: 97.4 F | OXYGEN SATURATION: 100 % | DIASTOLIC BLOOD PRESSURE: 84 MMHG

## 2018-11-20 DIAGNOSIS — E66.3 OVERWEIGHT (BMI 25.0-29.9): ICD-10-CM

## 2018-11-20 DIAGNOSIS — E11.29 CONTROLLED TYPE 2 DIABETES MELLITUS WITH MICROALBUMINURIA, WITHOUT LONG-TERM CURRENT USE OF INSULIN (HCC): ICD-10-CM

## 2018-11-20 DIAGNOSIS — I10 ESSENTIAL HYPERTENSION: ICD-10-CM

## 2018-11-20 DIAGNOSIS — R80.9 CONTROLLED TYPE 2 DIABETES MELLITUS WITH MICROALBUMINURIA, WITHOUT LONG-TERM CURRENT USE OF INSULIN (HCC): ICD-10-CM

## 2018-11-20 DIAGNOSIS — Z71.89 ADVANCE DIRECTIVE DISCUSSED WITH PATIENT: ICD-10-CM

## 2018-11-20 DIAGNOSIS — M85.80 OSTEOPENIA, UNSPECIFIED LOCATION: ICD-10-CM

## 2018-11-20 DIAGNOSIS — Z00.00 MEDICARE ANNUAL WELLNESS VISIT, SUBSEQUENT: Primary | ICD-10-CM

## 2018-11-20 RX ORDER — METFORMIN HYDROCHLORIDE 750 MG/1
750 TABLET, EXTENDED RELEASE ORAL DAILY
Qty: 90 TAB | Refills: 3 | Status: SHIPPED | OUTPATIENT
Start: 2018-11-20 | End: 2019-02-04 | Stop reason: SDUPTHER

## 2018-11-20 RX ORDER — GUAIFENESIN 100 MG/5ML
LIQUID (ML) ORAL
Qty: 90 TAB | Refills: 3 | Status: SHIPPED | OUTPATIENT
Start: 2018-11-20 | End: 2019-02-22 | Stop reason: SDUPTHER

## 2018-11-20 RX ORDER — ATORVASTATIN CALCIUM 80 MG/1
80 TABLET, FILM COATED ORAL
Qty: 90 TAB | Refills: 3 | Status: SHIPPED | OUTPATIENT
Start: 2018-11-20 | End: 2019-02-04 | Stop reason: SDUPTHER

## 2018-11-20 RX ORDER — POTASSIUM CHLORIDE 1.5 G/1.77G
20 POWDER, FOR SOLUTION ORAL EVERY OTHER DAY
Qty: 100 EACH | Refills: 3 | Status: SHIPPED | OUTPATIENT
Start: 2018-11-20 | End: 2019-02-22 | Stop reason: SDUPTHER

## 2018-11-20 RX ORDER — MULTIVITAMIN
1 TABLET ORAL 2 TIMES DAILY
Qty: 180 TAB | Refills: 3 | Status: SHIPPED | OUTPATIENT
Start: 2018-11-20 | End: 2019-02-22 | Stop reason: SDUPTHER

## 2018-11-20 NOTE — PROGRESS NOTES
Zay Cloud is a 80 y.o.  female presents today for office visit for Chief Complaint Patient presents with  Fall  Osteopenia [facility] Annual Wellness Visit Pt is not fasting and pt has not checked her BS. Pt is in Room # 2. This patient is accompanied in the office by her daughter and granddaughter. 1. Have you been to the ER, urgent care clinic since your last visit? Hospitalized since your last visit? Yes  [facility] for a fall 11/16/18 
 
2. Have you seen or consulted any other health care providers outside of the [address] [place] [address] since your last visit? Include any pap smears or colon screening. No 
 
Health Maintenance reviewed -  Pt states previous having the shingle vaccine at the local pharmacy. Requested Prescriptions No prescriptions requested or ordered in this encounter Visit Vitals /84 (BP 1 Location: Left arm, BP Patient Position: Sitting) Comment: manual  
Pulse (!) 54 Temp 97.4 °F (36.3 °C) (Oral) Resp 14 Ht 4' 9.75\" (1.467 m) Wt 135 lb 12.8 oz (61.6 kg) SpO2 100% BMI 28.63 kg/m² Upcoming Appts No 
 
VORB: No orders of the defined types were placed in this encounter. 
Marta Fatima MD/Yoana Desir LPN Health Care Directive or Living Will: yes Depression Risk Factor Screening:  
  
Patient Health Questionnaire (PHQ-2) Over the last 2 weeks, how often have you been bothered by any of the following problems? · Little interest or pleasure in doing things? · Not at all. [0] · Feeling down, depressed, or hopeless? · Not at all. [0] Total Score: 0/6 PHQ-2 Assessment Scoring: A score of 2 or more requires further screening with the PHQ-9 Alcohol Risk Factor Screening:  
 
Women: On any occasion during the past 3 months, have you had more than 3 drinks containing alcohol? No 
 Do you average more than 7 drinks per week? No 
 
 
Functional Ability and Level of Safety:  
 
Hearing Loss Hearing is good. Visual Acuity Screening Right eye Left eye Both eyes Without correction: 20/40 20/50 20/40 With correction: Activities of Daily Living Self-care. Requires assistance with: no ADLs Fall Risk No fall risk factors Abuse Screen Patient is not abused None Patient Care Team: 
Ivan Vizcaino MD as PCP - General (Internal Medicine) Medina Trujillo (Ophthalmology) Rona Chirinos RN as Ambulatory Care Navigator Socorro Blancas OD (Ophthalmology)

## 2018-11-20 NOTE — ACP (ADVANCE CARE PLANNING)
Pt would like to appoint her daughter, Christine James, as her medical decision maker in the event that she can no longer do so. Phone number is [phone]. Her secondary person is her daughter, Medardo Lester. Phone number is [phone]. If her death is imminent and medical treatment will not help her recover, pt does not want life prolonging measures. If her condition makes her unaware of herself or her surroundings and she cannot interact with others, pt does not want life prolonging measures. Advance directive scanned in the chart under media.

## 2018-11-20 NOTE — PATIENT INSTRUCTIONS
Schedule of Personalized Health Plan (Provide Copy to Patient) The best way to stay healthy is to live a healthy lifestyle. A healthy lifestyle includes regular exercise, eating a well-balanced diet, keeping a healthy weight and not smoking. Regular physical exams and screening tests are another important way to take care of yourself. Preventive exams provided by health care providers can find health problems early when treatment works best and can keep you from getting certain diseases or illnesses. Preventive services include exams, lab tests, screenings, shots, monitoring and information to help you take care of your own health. All people over 65 should have a pneumonia shot. Pneumonia shots are usually only needed once in a lifetime unless your doctor decides differently. All people over 65 should have a yearly flu shot. People over 65 are at medium to high risk for Hepatitis B. Three shots are needed for complete protection. In addition to your physical exam, some screening tests are recommended: 
 
 
Screening for Breast Cancer is recommended yearly with a mammogram. 
 
Screening for Cervical Cancer is recommended every two years (annually for certain risk factors, such as previous history of STD or abnormal PAP in past 7 years), with a Pelvic Exam with PAP 
 
 Here is a list of your current Health Maintenance items with a due date: 
Health Maintenance Topic Date Due  Shingrix Vaccine Age 50> (1 of 2) 12/14/1982  MEDICARE YEARLY EXAM  10/26/2018  Influenza Age 5 to Adult  03/31/2019 (Originally 8/1/2018)  HEMOGLOBIN A1C Q6M  04/03/2019  
 EYE EXAM RETINAL OR DILATED Q1  07/25/2019  
 FOOT EXAM Q1  10/03/2019  LIPID PANEL Q1  10/03/2019  GLAUCOMA SCREENING Q2Y  07/25/2020  
 DTaP/Tdap/Td series (3 - Td) 11/16/2028  Bone Densitometry (Dexa) Screening  Completed  Pneumococcal 65+ Low/Medium Risk  Addressed

## 2018-11-20 NOTE — TELEPHONE ENCOUNTER
Call placed to the pt's pharmacy re: if losartan was affected in drug recall. S/w Leticia, Pharmacist and advised that losartan is not affected in the recall. Will notify.

## 2018-11-20 NOTE — PROGRESS NOTES
Internal Medicine Progress Note Today's Date:  2017 Patient:  Zay Cloud Patient :  [id number] Subjective: Chief Complaint Patient presents with  Fall  Osteopenia [facility] Annual Wellness Visit  Hypertension  Diabetes Hypertension This is a chronic problem. BP is not at goal.  Pt is on chlorthalidone with potassium, norvasc and toprol. Pt reports compliance with these medications. Pt stopped losartan because her pharmacy called her to let her know it is recalled. Osteopenia This is a chronic problem. This is stable. Pt is off fosamax. Pt was on fosamax for two years for osteoporosis. Last dexa scan was in . Pt is on calcium/Vitamin D.  
  
Diabetes mellitus This is a chronic problem. This is controlled. Pt takes metformin. Pt is on aspirin and statin.  +microalbuminuria on an ARB. Vinicio Sg is cost prohibitive. Information given on ketogenic/IF diet during a previous visit 
  
Past Medical History:  
Diagnosis Date  Advance directive discussed with patient 10/12/2016  Cigarette nicotine dependence in remission 10/12/2016  Dizziness 3/16/2017  Essential hypertension 10/3/2016  Microalbuminuria 2017  Osteopenia 10/3/2016  Osteoporosis  Pure hypercholesterolemia 2017  Type II diabetes mellitus ([address] 10/3/2016  Vitamin D deficiency 2017 History reviewed. No pertinent surgical history. reports that she has never smoked. She has never used smokeless tobacco. She reports that she does not drink alcohol or use illicit drugs. Family History Problem Relation Age of Onset  Hypertension Mother  Cancer Mother   
  pancreas  Cancer Father   
  lung No Known Allergies Review of Systems Positives in bold CV:      chest pain, palpitations PULM:  SOB, wheezing, cough, sputum production Current Outpatient Meds and Allergies Current Outpatient Prescriptions on File Prior to Visit Medication Sig Dispense Refill  valsartan-hydroCHLOROthiazide (DIOVAN-HCT) 320-25 mg per tablet Take 1 Tab by mouth daily.  pravastatin (PRAVACHOL) 40 mg tablet Take 40 mg by mouth nightly.  metoprolol succinate (TOPROL-XL) 25 mg XL tablet Take 1 Tab by mouth daily. 90 Tab 3  
 metFORMIN (GLUCOPHAGE) 500 mg tablet Take 1 Tab by mouth two (2) times daily (with meals). 180 Tab 3  
 glucose blood VI test strips (BLOOD GLUCOSE TEST) strip Check BS BID. Dx code: E11.65 200 Strip 3  
 calcium-cholecalciferol, D3, (CALCIUM 600 + D) tablet Take 1 Tab by mouth two (2) times a day. 180 Tab 3  
 aspirin delayed-release 81 mg tablet Take  by mouth daily. No current facility-administered medications on file prior to visit. No Known Allergies Objective:    
VS:   
Visit Vitals  BP (!) 154/98 (BP 1 Location: Left arm, BP Patient Position: Sitting) Comment (BP 1 Location): manual  
 Pulse 62  Temp 96.9 °F (36.1 °C) (Oral)  Resp 20  
 Ht 4' 11\" (1.499 m)  Wt 128 lb 3.2 oz (58.2 kg)  SpO2 100%  BMI 25.89 kg/m2 General:   Well-nourished, well-groomed, pleasant, alert, in no acute distress Ears:  External ears WNL, TMs WNL Eyes:  EOMI, PERRL Nose:  External nares WNL Psych:  No pressured speech, no abnormal thought content PHQ over the last two weeks 11/20/2018 PHQ Not Done - Little interest or pleasure in doing things Not at all Feeling down, depressed, irritable, or hopeless Not at all Total Score PHQ 2 0 Lab Results Component Value Date/Time Hemoglobin A1c 7.5 (H) 10/03/2018 10:30 AM  
 Hemoglobin A1c 8.7 (H) 04/21/2017 01:33 PM  
 Hemoglobin A1c 8.6 (H) 03/01/2017 09:42 AM  
 Hemoglobin A1c, External 9.2 05/20/2016  Glucose 121 (H) 10/03/2018 10:30 AM  
 Glucose  03/27/2018 11:24 AM  
 Microalbumin/Creat ratio (mg/g creat) 114 (H) 10/25/2017 12:00 PM  
 Microalbumin,urine random 8.70 (H) 10/25/2017 12:00 PM  
 LDL, calculated 152.2 (H) 10/03/2018 10:30 AM  
 Creatinine, POC 0.6 04/04/2017 08:29 AM  
 Creatinine 0.76 10/03/2018 10:30 AM  
  
Assessment/Plan & Orders: ICD-10-CM ICD-9-CM 1. Medicare annual wellness visit, subsequent Z00.00 V70.0 2. Essential hypertension I10 401.9 3. Controlled type 2 diabetes mellitus with microalbuminuria, without long-term current use of insulin (HCC) E11.29 250.40 metFORMIN ER (GLUCOPHAGE XR) 750 mg tablet R80.9 791.0   
4. Osteopenia, unspecified location M85.80 733.90   
5. Overweight (BMI 25.0-29. 9) E66.3 278.02   
6. Advance directive discussed with patient Z71.89 V65.49 ADVANCE CARE PLANNING FIRST 30 MINS Healthy lifestyle has been encouraged including avoidance of tobacco, limiting or avoiding alcohol intake, heart healthy diet which is low in cholesterol and saturated fat and contains fresh fruits, vegetables and whole grains and fiber, regular exercise with goals of 20-30 minutes 3-5 days weekly and maintaining an optimal BMI. Continue checking BP at home. Call us if not at goal of 150/90 Bring BP log to next appt Will call pharmacy to see if her losartan was recalled Depression screening: 10/3/18 Follow-up Disposition: 
Return in about 3 months (around 2/20/2019) for Diabetes mellitus, Hypertension, Weight management. *Patient verbalized understanding and agreement with the plan. Patient was given an after-visit summary. Gianna Pierce. Percy Hickman MD - Internal Medicine 9/27/2017, 11:20 AM 
[facility] [address] [facility], [address] Phone [phone] Fax [phone]

## 2018-11-21 NOTE — TELEPHONE ENCOUNTER
Advise the pt no dosage or medication changes and to resume losartan per Dr. Percy Hickman. Pt is made aware and states she will restart the losartan medication.

## 2018-12-12 ENCOUNTER — DOCUMENTATION ONLY (OUTPATIENT)
Dept: FAMILY MEDICINE CLINIC | Age: 83
End: 2018-12-12

## 2019-02-04 DIAGNOSIS — E11.29 CONTROLLED TYPE 2 DIABETES MELLITUS WITH MICROALBUMINURIA, WITHOUT LONG-TERM CURRENT USE OF INSULIN (HCC): ICD-10-CM

## 2019-02-04 DIAGNOSIS — R80.9 CONTROLLED TYPE 2 DIABETES MELLITUS WITH MICROALBUMINURIA, WITHOUT LONG-TERM CURRENT USE OF INSULIN (HCC): ICD-10-CM

## 2019-02-04 DIAGNOSIS — I10 ESSENTIAL HYPERTENSION: Primary | ICD-10-CM

## 2019-02-04 RX ORDER — AMLODIPINE BESYLATE 10 MG/1
TABLET ORAL
Qty: 90 TAB | Refills: 3 | Status: SHIPPED | OUTPATIENT
Start: 2019-02-04 | End: 2019-02-22 | Stop reason: SDUPTHER

## 2019-02-05 RX ORDER — LANCETS 33 GAUGE
EACH MISCELLANEOUS
Qty: 100 LANCET | Refills: 3 | Status: SHIPPED | OUTPATIENT
Start: 2019-02-05 | End: 2019-02-20

## 2019-02-05 RX ORDER — METFORMIN HYDROCHLORIDE 750 MG/1
750 TABLET, EXTENDED RELEASE ORAL DAILY
Qty: 90 TAB | Refills: 3 | Status: SHIPPED | OUTPATIENT
Start: 2019-02-05 | End: 2019-02-22 | Stop reason: SDUPTHER

## 2019-02-05 RX ORDER — INSULIN PUMP SYRINGE, 3 ML
EACH MISCELLANEOUS
Qty: 1 KIT | Refills: 0 | Status: SHIPPED | OUTPATIENT
Start: 2019-02-05 | End: 2019-02-20

## 2019-02-05 RX ORDER — ATORVASTATIN CALCIUM 80 MG/1
80 TABLET, FILM COATED ORAL
Qty: 90 TAB | Refills: 3 | Status: SHIPPED | OUTPATIENT
Start: 2019-02-05 | End: 2019-02-22 | Stop reason: SDUPTHER

## 2019-02-20 ENCOUNTER — OFFICE VISIT (OUTPATIENT)
Dept: FAMILY MEDICINE CLINIC | Facility: CLINIC | Age: 84
End: 2019-02-20

## 2019-02-20 VITALS
HEART RATE: 58 BPM | OXYGEN SATURATION: 98 % | RESPIRATION RATE: 16 BRPM | SYSTOLIC BLOOD PRESSURE: 120 MMHG | HEIGHT: 57 IN | BODY MASS INDEX: 29.47 KG/M2 | TEMPERATURE: 95.9 F | DIASTOLIC BLOOD PRESSURE: 80 MMHG | WEIGHT: 136.6 LBS

## 2019-02-20 DIAGNOSIS — E66.3 OVERWEIGHT (BMI 25.0-29.9): ICD-10-CM

## 2019-02-20 DIAGNOSIS — R80.9 CONTROLLED TYPE 2 DIABETES MELLITUS WITH MICROALBUMINURIA, WITHOUT LONG-TERM CURRENT USE OF INSULIN (HCC): ICD-10-CM

## 2019-02-20 DIAGNOSIS — I10 ESSENTIAL HYPERTENSION: Primary | ICD-10-CM

## 2019-02-20 DIAGNOSIS — E11.29 CONTROLLED TYPE 2 DIABETES MELLITUS WITH MICROALBUMINURIA, WITHOUT LONG-TERM CURRENT USE OF INSULIN (HCC): ICD-10-CM

## 2019-02-20 LAB — HBA1C MFR BLD HPLC: 7.4 %

## 2019-02-20 NOTE — PROGRESS NOTES
Corry Blancas is 80 y.o. female presents today for office visit for follow up for hypertension and diabetes. Pt brought in personal B/P wrist cuff. Pt is fasting. Pt is in Room# 2. 
 
1. Have you been to the ER, urgent care clinic since your last visit? Hospitalized since your last visit? no 
 
2. Have you seen or consulted any other health care providers outside of the [address] [place] [address] since your last visit? Include any pap smears or colon screening. no 
 
Health Maintenance reviewed. Upcoming Appts 
none Requested Prescriptions No prescriptions requested or ordered in this encounter Visit Vitals /90 (BP 1 Location: Left arm, BP Patient Position: Sitting) Comment (BP Patient Position): manual  
Pulse (!) 58 Temp 95.9 °F (35.5 °C) (Oral) Resp 16 Ht 4' 9\" (1.448 m) Wt 136 lb 9.6 oz (62 kg) SpO2 98% BMI 29.56 kg/m² B/P per patient's wrist cuff : 170/96 on left wrist.

## 2019-02-20 NOTE — PROGRESS NOTES
Internal Medicine Progress Note Today's Date:  2017 Patient:  Ángel Chen Patient :  [id number] Subjective: Chief Complaint Patient presents with  Diabetes  Hypertension  Wheezing  Weight Management Hypertension This is a chronic problem. BP is at goal.  Pt is on losartan, chlorthalidone with potassium, norvasc and toprol. Pt reports compliance with these medications. Wheezing This is a new problem. This is at goal. This occurs on exertion. Pt used to smoke 50 yrs ago.  
  
Diabetes mellitus This is a chronic problem. This is controlled. Pt takes metformin. Pt is on aspirin and statin.  +microalbuminuria on an ARB. [place] is cost prohibitive. Overweight This is a chronic problem. This is not at goal. Pt exercises regularly. Pt tries to eat a healthy diet. Information given on ketogenic/IF diet during a previous visit but pt is not doing this. Depression screening: 10/3/18 
  
Past Medical History:  
Diagnosis Date  Advance directive discussed with patient 10/12/2016  Cigarette nicotine dependence in remission 10/12/2016  Dizziness 3/16/2017  Essential hypertension 10/3/2016  Microalbuminuria 2017  Osteopenia 10/3/2016  Osteoporosis  Pure hypercholesterolemia 2017  Type II diabetes mellitus ([facility] [address] 10/3/2016  Vitamin D deficiency 2017 History reviewed. No pertinent surgical history. reports that she has never smoked. She has never used smokeless tobacco. She reports that she does not drink alcohol or use illicit drugs. Family History Problem Relation Age of Onset  Hypertension Mother  Cancer Mother   
  pancreas  Cancer Father   
  lung No Known Allergies Review of Systems Positives in bold CV:      chest pain, palpitations PULM:  SOB, wheezing, cough, sputum production Current Outpatient Meds and Allergies Current Outpatient Prescriptions on File Prior to Visit Medication Sig Dispense Refill  valsartan-hydroCHLOROthiazide (DIOVAN-HCT) 320-25 mg per tablet Take 1 Tab by mouth daily.  pravastatin (PRAVACHOL) 40 mg tablet Take 40 mg by mouth nightly.  metoprolol succinate (TOPROL-XL) 25 mg XL tablet Take 1 Tab by mouth daily. 90 Tab 3  
 metFORMIN (GLUCOPHAGE) 500 mg tablet Take 1 Tab by mouth two (2) times daily (with meals). 180 Tab 3  
 glucose blood VI test strips (BLOOD GLUCOSE TEST) strip Check BS BID. Dx code: E11.65 200 Strip 3  
 calcium-cholecalciferol, D3, (CALCIUM 600 + D) tablet Take 1 Tab by mouth two (2) times a day. 180 Tab 3  
 aspirin delayed-release 81 mg tablet Take  by mouth daily. No current facility-administered medications on file prior to visit. No Known Allergies Objective:    
VS:   
Visit Vitals  BP (!) 154/98 (BP 1 Location: Left arm, BP Patient Position: Sitting) Comment (BP 1 Location): manual  
 Pulse 62  Temp 96.9 °F (36.1 °C) (Oral)  Resp 20  
 Ht 4' 11\" (1.499 m)  Wt 128 lb 3.2 oz (58.2 kg)  SpO2 100%  BMI 25.89 kg/m2 General:   Well-nourished, well-groomed, pleasant, alert, in no acute distress Ears:  External ears WNL, TMs WNL Eyes:  EOMI, PERRL Nose:  External nares WNL Psych:  No pressured speech, no abnormal thought content 3 most recent PHQ Screens 11/20/2018 PHQ Not Done - Little interest or pleasure in doing things Not at all Feeling down, depressed, irritable, or hopeless Not at all Total Score PHQ 2 0 Lab Results Component Value Date/Time Hemoglobin A1c 7.5 (H) 10/03/2018 10:30 AM  
 Hemoglobin A1c 8.7 (H) 04/21/2017 01:33 PM  
 Hemoglobin A1c 8.6 (H) 03/01/2017 09:42 AM  
 Hemoglobin A1c, External 9.2 05/20/2016  Glucose 121 (H) 10/03/2018 10:30 AM  
 Glucose  03/27/2018 11:24 AM  
 Microalbumin/Creat ratio (mg/g creat) 114 (H) 10/25/2017 12:00 PM  
 Microalbumin,urine random 8.70 (H) 10/25/2017 12:00 PM  
 LDL, calculated 152.2 (H) 10/03/2018 10:30 AM  
 Creatinine, POC 0.6 04/04/2017 08:29 AM  
 Creatinine 0.76 10/03/2018 10:30 AM  
  
Assessment/Plan & Orders: ICD-10-CM ICD-9-CM 1. Essential hypertension I10 401.9 2. Controlled type 2 diabetes mellitus with microalbuminuria, without long-term current use of insulin (HCC) E11.29 250.40 AMB POC HEMOGLOBIN A1C  
 R80.9 791.0 3. Overweight (BMI 25.0-29. 9) E66.3 278.02 Healthy lifestyle has been encouraged including avoidance of tobacco, limiting or avoiding alcohol intake, heart healthy diet which is low in cholesterol and saturated fat and contains fresh fruits, vegetables and whole grains and fiber, regular exercise with goals of 20-30 minutes 3-5 days weekly and maintaining an optimal BMI. Continue checking BP at home. Call us if not at goal of 150/90 Bring BP log to next appt Pt to call pharmacy regarding her BP refills Pt declined an inhaler Follow-up Disposition: 
Return in about 3 months (around 5/20/2019) for Hypertension, Weight management, Diabetes mellitus. *Patient verbalized understanding and agreement with the plan. Patient was given an after-visit summary. Sarabjit Traylor. Miguel Tran MD - Internal Medicine 9/27/2017, 11:20 AM 
[facility] [address] [facility], [address] Phone [phone] Fax [phone]

## 2019-02-20 NOTE — PATIENT INSTRUCTIONS
Learning About High Blood Pressure What is high blood pressure? Blood pressure is a measure of how hard the blood pushes against the walls of your arteries. It's normal for blood pressure to go up and down throughout the day, but if it stays up, you have high blood pressure. Another name for high blood pressure is hypertension. Two numbers tell you your blood pressure. The first number is the systolic pressure. It shows how hard the blood pushes when your heart is pumping. The second number is the diastolic pressure. It shows how hard the blood pushes between heartbeats, when your heart is relaxed and filling with blood. Your doctor will give you a goal for your blood pressure. Your goal will be based on your health and your age. High blood pressure (hypertension) means that the top number stays high, or the bottom number stays high, or both. High blood pressure increases the risk of stroke, heart attack, and other problems. You and your doctor will talk about your risks of these problems based on your blood pressure. What happens when you have high blood pressure? · Blood flows through your arteries with too much force. Over time, this damages the walls of your arteries. But you can't feel it. High blood pressure usually doesn't cause symptoms. · Fat and calcium start to build up in your arteries. This buildup is called plaque. Plaque makes your arteries narrower and stiffer. Blood can't flow through them as easily. · This lack of good blood flow starts to damage some of the organs in your body. This can lead to problems such as coronary artery disease and heart attack, heart failure, stroke, kidney failure, and eye damage. How can you prevent high blood pressure? · Stay at a healthy weight. · Try to limit how much sodium you eat to less than 2,300 milligrams (mg) a day. If you limit your sodium to 1,500 mg a day, you can lower your blood pressure even more. ? Buy foods that are labeled \"unsalted,\" \"sodium-free,\" or \"low-sodium. \" Foods labeled \"reduced-sodium\" and \"light sodium\" may still have too much sodium. ? Flavor your food with garlic, lemon juice, onion, vinegar, herbs, and spices instead of salt. Do not use soy sauce, steak sauce, onion salt, garlic salt, mustard, or ketchup on your food. ? Use less salt (or none) when recipes call for it. You can often use half the salt a recipe calls for without losing flavor. · Be physically active. Get at least 30 minutes of exercise on most days of the week. Walking is a good choice. You also may want to do other activities, such as running, swimming, cycling, or playing tennis or team sports. · Limit alcohol to 2 drinks a day for men and 1 drink a day for women. · Eat plenty of fruits, vegetables, and low-fat dairy products. Eat less saturated and total fats. How is high blood pressure treated? · Your doctor will suggest making lifestyle changes. For example, your doctor may ask you to eat healthy foods, quit smoking, lose extra weight, and be more active. · If lifestyle changes don't help enough, your doctor may recommend that you take medicine. · When blood pressure is very high, medicines are needed to lower it. Follow-up care is a key part of your treatment and safety. Be sure to make and go to all appointments, and call your doctor if you are having problems. It's also a good idea to know your test results and keep a list of the medicines you take. Where can you learn more? Go to http://shashank-louie.info/. Enter P501 in the search box to learn more about \"Learning About High Blood Pressure. \" Current as of: July 22, 2018 Content Version: 11.9 © 5536-7061 [employer]. Care instructions adapted under license by [employer] (which disclaims liability or warranty for this information).  If you have questions about a medical condition or this instruction, always ask your healthcare professional. [place] [address] any warranty or liability for your use of this information.

## 2019-02-22 DIAGNOSIS — I10 ESSENTIAL HYPERTENSION: ICD-10-CM

## 2019-02-22 DIAGNOSIS — R80.9 CONTROLLED TYPE 2 DIABETES MELLITUS WITH MICROALBUMINURIA, WITHOUT LONG-TERM CURRENT USE OF INSULIN (HCC): ICD-10-CM

## 2019-02-22 DIAGNOSIS — E11.29 CONTROLLED TYPE 2 DIABETES MELLITUS WITH MICROALBUMINURIA, WITHOUT LONG-TERM CURRENT USE OF INSULIN (HCC): ICD-10-CM

## 2019-02-22 DIAGNOSIS — I10 ESSENTIAL HYPERTENSION, BENIGN: ICD-10-CM

## 2019-02-22 NOTE — TELEPHONE ENCOUNTER
[facility] - Pediatric[facility]    [address] [place] [address]    Phone:  [phone]    Fax:  [phone]       Thank You for choosing us for your health care visit. We are glad to serve you and happy to provide you with this summary of your visit. Please help us to ensure we have accurate records. If you find anything that needs to be changed, please let our staff know as soon as possible.          Your Demographic Information     Patient Name Sex Keegan Berger Male 2015       Ethnic Group Patient Race    Not of  or  Origin White      Your Visit Details     Date & Time Provider Department    2/3/2017 11:00 AM Fernando Hsieh MD [facility]      Your Upcoming Appointment*(Max 10)     2017  8:00 AM CDT   Well Child Exam with Fernando Hsieh MD   [facility] - [facility] ([facility])    [address] [place] [address] [phone]              Conditions Discussed Today or Order-Related Diagnoses        Comments    Croup    -  Primary       Your Vitals Were     Pulse Temp Height Weight HC BMI    120 98 °F (36.7 °C) (Temporal Artery) 2' 8.5\" (0.826 m) (69 %, Z= 0.50)* 23 lb 6 oz (10.6 kg) (46 %, Z= -0.10)* 48 cm (18.9\") (73 %, Z= 0.61)* 15.56 kg/m2    Smoking Status                   Never Smoker         *Growth percentiles are based on WHO (Boys, 0-2 years) data.      Medications Prescribed or Re-Ordered Today     prednisoLONE sod-phos (ORAPRED) 15 MG/5ML solution    Sig: Take 4 ml po daily For 5 days    Class: Eprescribe    Pharmacy: [facility] [address] [place] [address] [facility] [address] Ph #: [phone]      Your Current Medications Are        Disp Refills Start End    prednisoLONE sod-phos (ORAPRED) 15 MG/5ML solution 25 mL 0 2/3/2017     Sig: Take 4 ml po daily For 5 days    Class: Eprescribe      Allergies     No Known Allergies    Patient called and stated that she needs all of her prescriptions to be mailed into [address] [facility].   Immunizations History as of 2/3/2017     Name Date    DTaP 12/20/2016  2:13 PM    DTaP/Hep B/IPV 3/24/2016  8:38 AM, 1/21/2016 10:10 AM, 2015  2:05 PM    HEPATITIS A CHILD 9/16/2016 11:03 AM    HIB 9/16/2016 11:02 AM, 1/21/2016 10:13 AM, 2015  2:03 PM    Hepatitis B Child 2015  2:05 AM    INFLUENZA QUADRIVALENT 9/16/2016 11:04 AM    Influenza Quadrivalent Preservative Free 12/20/2016  2:14 PM    MMR 12/20/2016  2:11 PM    Pneumococcal Conjugate 13 Valent 9/16/2016 11:02 AM, 3/24/2016  8:39 AM, 1/21/2016 10:12 AM, 2015  2:05 PM    Rotavirus - Pentavalent 3/24/2016  8:39 AM, 1/21/2016 10:11 AM, 2015  2:04 PM    Varicella 12/20/2016  2:12 PM              Patient Instructions    Croup  (from [employer]org)    Croup is an inflammation of the voice box (larynx) and windpipe (trachea). It causes a barking cough and a high-pitched sound when breathing in. Although croup is sometimes associated with allergies, it usually is caused by a virus, most commonly the parainfluenza virus. The illness most often is “caught” from someone who is infected, sometimes from air droplets or from your child’s own hand, which he uses to transfer the virus into his nose or eyes.    Croup tends to occur in the fall and winter when your child is between three months and three years old. Initially he may develop nasal stuffiness resembling a cold, and he may have a fever. After a day or two, the sound of the cough will turn into something resembling barking. The cough tends to become worse at night.    The greatest danger with croup is that your child’s airway will continue to swell, further narrowing his windpipe and making it difficult, at times almost impossible, to breathe. As your child tires from the effort of breathing, he may stop eating and drinking. He also may become too fatigued to cough. Some children are particularly prone to getting a croup-like cough and seem to develop such a cough whenever  they have a respiratory illness.    Treatment  If your child has mild croup symptoms, steam up the bathroom by turning on hot water in the shower, take her into the steamy bathroom, close the door, and sit in the bathroom with your child. Inhaling the warm, humidified air should ease her breathing within fifteen to twenty minutes. Or, weather permitting, you can take her outside to breathe in the cool, wet night air. While she is sleeping use a cold-water vaporizer or humidifier in your child’s room.    Do not try to open your child’s airway with your finger. Her breathing is being obstructed by swollen tissue beyond your reach, so you can’t clear it away. She may throw up because of the coughing, but don’t try to make her vomit. Pay close attention to your child’s breathing. Take her to the nearest emergency room immediately if:    · She seems to be struggling to get a breath.  · She can’t speak because of a lack of breath.  · She gets excessively sleepy.  · She turns blue when she coughs.  Your pediatrician may prescribe various medications, usually steroids, to help decrease the swelling in the upper airway and throat and make it easier for her to breathe. Antibiotics are not helpful for croup because the problem is caused by a virus or an allergy. Cough syrups do not help, either. In fact, as stated earlier, over-the-counter cough medicines don’t work for children under age six and should not be given to children under two years of age as they may pose a health risk.    In the most serious cases, which are quite rare, your child will have a lot of difficulty breathing, and your pediatrician may admit her to the hospital for a few days until the swelling in the airway gets better.

## 2019-02-24 RX ORDER — MULTIVITAMIN
1 TABLET ORAL 2 TIMES DAILY
Qty: 180 TAB | Refills: 3 | Status: SHIPPED | OUTPATIENT
Start: 2019-02-24

## 2019-02-24 RX ORDER — LOSARTAN POTASSIUM 50 MG/1
50 TABLET ORAL DAILY
Qty: 90 TAB | Refills: 3 | Status: SHIPPED | OUTPATIENT
Start: 2019-02-24 | End: 2019-04-22 | Stop reason: SDUPTHER

## 2019-02-24 RX ORDER — AMLODIPINE BESYLATE 10 MG/1
TABLET ORAL
Qty: 90 TAB | Refills: 3 | Status: SHIPPED | OUTPATIENT
Start: 2019-02-24 | End: 2020-03-29

## 2019-02-24 RX ORDER — POTASSIUM CHLORIDE 1.5 G/1.77G
20 POWDER, FOR SOLUTION ORAL EVERY OTHER DAY
Qty: 45 PACKET | Refills: 3 | Status: SHIPPED | OUTPATIENT
Start: 2019-02-24 | End: 2019-04-29

## 2019-02-24 RX ORDER — GUAIFENESIN 100 MG/5ML
LIQUID (ML) ORAL
Qty: 90 TAB | Refills: 3 | Status: SHIPPED | OUTPATIENT
Start: 2019-02-24 | End: 2019-09-11

## 2019-02-24 RX ORDER — METOPROLOL SUCCINATE 25 MG/1
25 TABLET, EXTENDED RELEASE ORAL DAILY
Qty: 90 TAB | Refills: 3 | Status: SHIPPED | OUTPATIENT
Start: 2019-02-24 | End: 2019-09-11

## 2019-02-24 RX ORDER — METFORMIN HYDROCHLORIDE 750 MG/1
750 TABLET, EXTENDED RELEASE ORAL DAILY
Qty: 90 TAB | Refills: 3 | Status: SHIPPED | OUTPATIENT
Start: 2019-02-24 | End: 2020-03-29

## 2019-02-24 RX ORDER — ATORVASTATIN CALCIUM 80 MG/1
80 TABLET, FILM COATED ORAL
Qty: 90 TAB | Refills: 3 | Status: SHIPPED | OUTPATIENT
Start: 2019-02-24 | End: 2020-03-29

## 2019-03-11 ENCOUNTER — DOCUMENTATION ONLY (OUTPATIENT)
Dept: FAMILY MEDICINE CLINIC | Age: 84
End: 2019-03-11

## 2019-04-22 ENCOUNTER — OFFICE VISIT (OUTPATIENT)
Dept: FAMILY MEDICINE CLINIC | Facility: CLINIC | Age: 84
End: 2019-04-22

## 2019-04-22 ENCOUNTER — TELEPHONE (OUTPATIENT)
Dept: FAMILY MEDICINE CLINIC | Facility: CLINIC | Age: 84
End: 2019-04-22

## 2019-04-22 VITALS
BODY MASS INDEX: 28.65 KG/M2 | HEIGHT: 57 IN | RESPIRATION RATE: 15 BRPM | SYSTOLIC BLOOD PRESSURE: 170 MMHG | DIASTOLIC BLOOD PRESSURE: 80 MMHG | HEART RATE: 68 BPM | WEIGHT: 132.8 LBS | OXYGEN SATURATION: 100 % | TEMPERATURE: 95.7 F

## 2019-04-22 DIAGNOSIS — E66.3 OVERWEIGHT (BMI 25.0-29.9): Primary | ICD-10-CM

## 2019-04-22 DIAGNOSIS — R80.9 CONTROLLED TYPE 2 DIABETES MELLITUS WITH MICROALBUMINURIA, WITHOUT LONG-TERM CURRENT USE OF INSULIN (HCC): ICD-10-CM

## 2019-04-22 DIAGNOSIS — E11.29 CONTROLLED TYPE 2 DIABETES MELLITUS WITH MICROALBUMINURIA, WITHOUT LONG-TERM CURRENT USE OF INSULIN (HCC): ICD-10-CM

## 2019-04-22 DIAGNOSIS — Z13.31 SCREENING FOR DEPRESSION: ICD-10-CM

## 2019-04-22 DIAGNOSIS — I10 ESSENTIAL HYPERTENSION, BENIGN: ICD-10-CM

## 2019-04-22 RX ORDER — LOSARTAN POTASSIUM 50 MG/1
50 TABLET ORAL DAILY
Qty: 90 TAB | Refills: 3 | Status: SHIPPED | OUTPATIENT
Start: 2019-04-22 | End: 2019-04-29 | Stop reason: DRUGHIGH

## 2019-04-22 NOTE — PROGRESS NOTES
Internal Medicine Progress Note Today's Date:  2017 Patient:  Lizeth Palacios Patient :  [id number] Subjective: Chief Complaint Patient presents with  Hypertension  Diabetes  Weight Management Hypertension This is a chronic problem. BP is at goal.  Pt is on chlorthalidone with potassium, norvasc and toprol. Pt reports compliance with these medications. Pt is supposed to be on losartan but she let the prescription run out. Wheezing This is a new problem. This is at goal. This occurs on exertion. Pt used to smoke 50 yrs ago. She declines an albuterol inhaler.  
  
Diabetes mellitus This is a chronic problem. This is controlled. Pt takes metformin. Pt is on aspirin and statin.  +microalbuminuria on an ARB. [employer] is cost prohibitive. Overweight This is a chronic problem. This is not at goal. Pt exercises regularly. Pt tries to eat a healthy diet. Information given on ketogenic/IF diet during a previous visit but pt is not doing this. 3 most recent PHQ Screens 2019 PHQ Not Done - Little interest or pleasure in doing things Not at all Feeling down, depressed, irritable, or hopeless Not at all Total Score PHQ 2 0 Past Medical History:  
Diagnosis Date  Advance directive discussed with patient 10/12/2016  Cigarette nicotine dependence in remission 10/12/2016  Dizziness 3/16/2017  Essential hypertension 10/3/2016  Microalbuminuria 2017  Osteopenia 10/3/2016  Osteoporosis  Pure hypercholesterolemia 2017  Type II diabetes mellitus ([facility] [address] 10/3/2016  Vitamin D deficiency 2017 History reviewed. No pertinent surgical history. reports that she has never smoked. She has never used smokeless tobacco. She reports that she does not drink alcohol or use illicit drugs. Family History Problem Relation Age of Onset  Hypertension Mother  Cancer Mother   
  pancreas  Cancer Father   
  lung No Known Allergies Review of Systems CV:      chest pain, palpitations PULM:  SOB, wheezing, cough, sputum production Current Outpatient Meds and Allergies Current Outpatient Prescriptions on File Prior to Visit Medication Sig Dispense Refill  valsartan-hydroCHLOROthiazide (DIOVAN-HCT) 320-25 mg per tablet Take 1 Tab by mouth daily.  pravastatin (PRAVACHOL) 40 mg tablet Take 40 mg by mouth nightly.  metoprolol succinate (TOPROL-XL) 25 mg XL tablet Take 1 Tab by mouth daily. 90 Tab 3  
 metFORMIN (GLUCOPHAGE) 500 mg tablet Take 1 Tab by mouth two (2) times daily (with meals). 180 Tab 3  
 glucose blood VI test strips (BLOOD GLUCOSE TEST) strip Check BS BID. Dx code: E11.65 200 Strip 3  
 calcium-cholecalciferol, D3, (CALCIUM 600 + D) tablet Take 1 Tab by mouth two (2) times a day. 180 Tab 3  
 aspirin delayed-release 81 mg tablet Take  by mouth daily. No current facility-administered medications on file prior to visit. No Known Allergies Objective:    
 
Visit Vitals  BP (!) 154/98 (BP 1 Location: Left arm, BP Patient Position: Sitting) Comment (BP 1 Location): manual  
 Pulse 62  Temp 96.9 °F (36.1 °C) (Oral)  Resp 20  
 Ht 4' 11\" (1.499 m)  Wt 128 lb 3.2 oz (58.2 kg)  SpO2 100%  BMI 25.89 kg/m2 General:   Well-nourished, well-groomed, pleasant, alert, in no acute distress Ears:  External ears WNL, TMs WNL Eyes:  EOMI, PERRL Nose:  External nares WNL Psych:  No pressured speech, no abnormal thought content 3 most recent PHQ Screens 4/22/2019 PHQ Not Done - Little interest or pleasure in doing things Not at all Feeling down, depressed, irritable, or hopeless Not at all Total Score PHQ 2 0 Lab Results Component Value Date/Time Hemoglobin A1c 7.5 (H) 10/03/2018 10:30 AM  
 Hemoglobin A1c 8.7 (H) 04/21/2017 01:33 PM  
 Hemoglobin A1c 8.6 (H) 03/01/2017 09:42 AM  
 Hemoglobin A1c, External 9.2 05/20/2016 Glucose 121 (H) 10/03/2018 10:30 AM  
 Glucose  03/27/2018 11:24 AM  
 Microalbumin/Creat ratio (mg/g creat) 114 (H) 10/25/2017 12:00 PM  
 Microalbumin,urine random 8.70 (H) 10/25/2017 12:00 PM  
 LDL, calculated 152.2 (H) 10/03/2018 10:30 AM  
 Creatinine, POC 0.6 04/04/2017 08:29 AM  
 Creatinine 0.76 10/03/2018 10:30 AM  
  
Assessment/Plan & Orders: ICD-10-CM ICD-9-CM 1. Overweight (BMI 25.0-29. 9) E66.3 278.02   
2. Essential hypertension, benign I10 401.1 losartan (COZAAR) 50 mg tablet 3. Controlled type 2 diabetes mellitus with microalbuminuria, without long-term current use of insulin (HCC) E11.29 250.40   
 R80.9 791.0 4. Screening for depression Z13.31 V79.0 [place] PATIENT SCREENED FOR DEPRESSION Continue checking BP at home. Call us if not at goal of 150/90 Bring BP log to next appt Pt declines an inhaler for a second time Follow-up and Dispositions · Return in about 1 week (around 4/29/2019) for Hypertension, Diabetes mellitus, Weight management. *Patient verbalized understanding and agreement with the plan. Patient was given an after-visit summary. Gretchen Palomares. Reji Kaiser MD - Internal Medicine 9/27/2017, 11:20 AM 
[address], [address] Phone [phone] Fax [phone]

## 2019-04-22 NOTE — TELEPHONE ENCOUNTER
Ms. Lexis Naranjo called today stating that she received a prescription for losartan instead of valsartan. She indicated that she has never taken losartan and thought there was a mix up. Please contact patient to clarify.

## 2019-04-22 NOTE — PROGRESS NOTES
Patty Dancer is a 80 y.o.  female presents today for office visit for follow up. Pt would also like to discuss HTN. Pt is  fasting. Pt is in Room # 2 1. Have you been to the ER, urgent care clinic since your last visit? Hospitalized since your last visit? No 
 
2. Have you seen or consulted any other health care providers outside of the [address] [place] [address] since your last visit? Include any pap smears or colon screening. No 
 
Upcoming Appts 
none Health Maintenance reviewed VORB: No orders of the defined types were placed in this encounter.  
Julee Hurt, Milka Rivera, JULIANN

## 2019-04-23 NOTE — TELEPHONE ENCOUNTER
Patient picked up just enough to get to  May the 1st. Insurance would not cover.  Patient would like medication sent to [facility] for refill

## 2019-04-25 ENCOUNTER — HOSPITAL ENCOUNTER (EMERGENCY)
Age: 84
Discharge: HOME | End: 2019-04-25
Attending: EMERGENCY MEDICINE
Payer: MEDICARE

## 2019-04-25 VITALS
HEART RATE: 56 BPM | OXYGEN SATURATION: 100 % | TEMPERATURE: 98.1 F | HEIGHT: 59 IN | DIASTOLIC BLOOD PRESSURE: 63 MMHG | WEIGHT: 132 LBS | BODY MASS INDEX: 26.61 KG/M2 | SYSTOLIC BLOOD PRESSURE: 190 MMHG | RESPIRATION RATE: 16 BRPM

## 2019-04-25 DIAGNOSIS — M54.2 NECK PAIN: Primary | ICD-10-CM

## 2019-04-25 DIAGNOSIS — I10 HYPERTENSION, ACCELERATED: ICD-10-CM

## 2019-04-25 LAB
ALBUMIN SERPL-MCNC: 3.8 G/DL (ref 3.4–5)
ALBUMIN/GLOB SERPL: 1 {RATIO} (ref 0.8–1.7)
ALP SERPL-CCNC: 98 U/L (ref 45–117)
ALT SERPL-CCNC: 20 U/L (ref 13–56)
ANION GAP SERPL CALC-SCNC: 8 MMOL/L (ref 3–18)
AST SERPL-CCNC: 13 U/L (ref 15–37)
BASOPHILS # BLD: 0 K/UL (ref 0–0.1)
BASOPHILS NFR BLD: 0 % (ref 0–2)
BILIRUB SERPL-MCNC: 0.3 MG/DL (ref 0.2–1)
BUN SERPL-MCNC: 14 MG/DL (ref 7–18)
BUN/CREAT SERPL: 20 (ref 12–20)
CALCIUM SERPL-MCNC: 9 MG/DL (ref 8.5–10.1)
CHLORIDE SERPL-SCNC: 107 MMOL/L (ref 100–108)
CO2 SERPL-SCNC: 25 MMOL/L (ref 21–32)
CREAT SERPL-MCNC: 0.7 MG/DL (ref 0.6–1.3)
DIFFERENTIAL METHOD BLD: ABNORMAL
EOSINOPHIL # BLD: 0.1 K/UL (ref 0–0.4)
EOSINOPHIL NFR BLD: 2 % (ref 0–5)
ERYTHROCYTE [DISTWIDTH] IN BLOOD BY AUTOMATED COUNT: 13.5 % (ref 11.6–14.5)
GLOBULIN SER CALC-MCNC: 3.7 G/DL (ref 2–4)
GLUCOSE SERPL-MCNC: 147 MG/DL (ref 74–99)
HCT VFR BLD AUTO: 40 % (ref 35–45)
HGB BLD-MCNC: 13.7 G/DL (ref 12–16)
LYMPHOCYTES # BLD: 3.2 K/UL (ref 0.9–3.6)
LYMPHOCYTES NFR BLD: 45 % (ref 21–52)
MCH RBC QN AUTO: 30.9 PG (ref 24–34)
MCHC RBC AUTO-ENTMCNC: 34.3 G/DL (ref 31–37)
MCV RBC AUTO: 90.1 FL (ref 74–97)
MONOCYTES # BLD: 0.6 K/UL (ref 0.05–1.2)
MONOCYTES NFR BLD: 8 % (ref 3–10)
NEUTS SEG # BLD: 3.2 K/UL (ref 1.8–8)
NEUTS SEG NFR BLD: 45 % (ref 40–73)
PLATELET # BLD AUTO: 265 K/UL (ref 135–420)
PMV BLD AUTO: 11.9 FL (ref 9.2–11.8)
POTASSIUM SERPL-SCNC: 3.5 MMOL/L (ref 3.5–5.5)
PROT SERPL-MCNC: 7.5 G/DL (ref 6.4–8.2)
RBC # BLD AUTO: 4.44 M/UL (ref 4.2–5.3)
SODIUM SERPL-SCNC: 140 MMOL/L (ref 136–145)
TROPONIN I SERPL-MCNC: <0.02 NG/ML (ref 0–0.04)
WBC # BLD AUTO: 7 K/UL (ref 4.6–13.2)

## 2019-04-25 PROCEDURE — 84484 ASSAY OF TROPONIN QUANT: CPT

## 2019-04-25 PROCEDURE — 80053 COMPREHEN METABOLIC PANEL: CPT

## 2019-04-25 PROCEDURE — 85025 COMPLETE CBC W/AUTO DIFF WBC: CPT

## 2019-04-25 PROCEDURE — 96374 THER/PROPH/DIAG INJ IV PUSH: CPT

## 2019-04-25 PROCEDURE — 99284 EMERGENCY DEPT VISIT MOD MDM: CPT

## 2019-04-25 PROCEDURE — 74011250637 HC RX REV CODE- 250/637: Performed by: EMERGENCY MEDICINE

## 2019-04-25 PROCEDURE — 94762 N-INVAS EAR/PLS OXIMTRY CONT: CPT

## 2019-04-25 PROCEDURE — 74011250636 HC RX REV CODE- 250/636: Performed by: EMERGENCY MEDICINE

## 2019-04-25 RX ORDER — ACETAMINOPHEN 500 MG
1000 TABLET ORAL
Status: COMPLETED | OUTPATIENT
Start: 2019-04-25 | End: 2019-04-25

## 2019-04-25 RX ORDER — CLONIDINE HYDROCHLORIDE 0.1 MG/1
0.1 TABLET ORAL
Status: DISCONTINUED | OUTPATIENT
Start: 2019-04-25 | End: 2019-04-25

## 2019-04-25 RX ORDER — DIAZEPAM 2 MG/1
2 TABLET ORAL
Qty: 10 TAB | Refills: 0 | Status: SHIPPED | OUTPATIENT
Start: 2019-04-25 | End: 2019-04-29

## 2019-04-25 RX ORDER — DIAZEPAM 5 MG/1
2.5 TABLET ORAL
Status: COMPLETED | OUTPATIENT
Start: 2019-04-25 | End: 2019-04-25

## 2019-04-25 RX ORDER — HYDRALAZINE HYDROCHLORIDE 20 MG/ML
10 INJECTION INTRAMUSCULAR; INTRAVENOUS ONCE
Status: COMPLETED | OUTPATIENT
Start: 2019-04-25 | End: 2019-04-25

## 2019-04-25 RX ORDER — ACETAMINOPHEN 500 MG
1000 TABLET ORAL
Qty: 50 TAB | Refills: 0 | Status: SHIPPED | OUTPATIENT
Start: 2019-04-25 | End: 2019-04-29

## 2019-04-25 RX ADMIN — DIAZEPAM 2.5 MG: 5 TABLET ORAL at 04:59

## 2019-04-25 RX ADMIN — ACETAMINOPHEN 1000 MG: 500 TABLET ORAL at 04:59

## 2019-04-25 RX ADMIN — HYDRALAZINE HYDROCHLORIDE 10 MG: 20 INJECTION INTRAMUSCULAR; INTRAVENOUS at 05:15

## 2019-04-25 NOTE — ED TRIAGE NOTES
Patient arrived to registration with daughter stating \"I think shes having a stroke\". Per patient and daughter patient has hx TIA with left facial droop deficit. Patient primary complaint of neck pressure that began out of nowhere last night at 1900, worsens with movement. Denies headache, weakness, dizziness or blurred vision. Ambulatory to registration. Upon initial assessment, equal  strength, no sensation deficit and no weakness noted to any extremity however daughter states facial droop is more significant than normal. Code S level 1 was called.

## 2019-04-25 NOTE — ED PROVIDER NOTES
Andres Hooks is a 80 y.o. Female with prior history of stroke and chronic left facial droop who complains of left-sided neck pain that started yesterday in the posterior left side. Patient denies any trauma or known trigger to the pain. She denies any new headache, difficulty swallowing, speech issue or visual problems. She has had no nausea, vomiting, diarrhea, weakness or new tingling. Symptoms are reproduced when she turns her head to the left side. Resolves when she does not. She did not take anything for it. The history is provided by the patient and a relative. Past Medical History:  
Diagnosis Date  Advance directive discussed with patient 10/12/2016  Cigarette nicotine dependence in remission 10/12/2016  Dizziness 3/16/2017  Essential hypertension 10/3/2016  Microalbuminuria 1/18/2017  Osteopenia 10/3/2016  Osteoporosis  Overweight (BMI 25.0-29.9) 5/10/2018  Pure hypercholesterolemia 1/18/2017  Type II diabetes mellitus ([address] 10/3/2016  Vitamin D deficiency 1/18/2017  White coat syndrome with hypertension 3/27/2018 History reviewed. No pertinent surgical history. Family History:  
Problem Relation Age of Onset  Hypertension Mother  Cancer Mother   
     pancreas  Cancer Father   
     lung Social History Socioeconomic History  Marital status:  Spouse name: Not on file  Number of children: 2  
 Years of education: 15  
 Highest education level: Some college, no degree Occupational History  Not on file Social Needs  Financial resource strain: Not on file  Food insecurity:  
  Worry: Not on file Inability: Not on file  Transportation needs:  
  Medical: Not on file Non-medical: Not on file Tobacco Use  Smoking status: Never Smoker  Smokeless tobacco: Never Used Substance and Sexual Activity  Alcohol use: No  
 Drug use: No  
 Sexual activity: Never Lifestyle  Physical activity:  
  Days per week: Not on file Minutes per session: Not on file  Stress: Not on file Relationships  Social connections:  
  Talks on phone: Not on file Gets together: Not on file Attends Syn[employer] service: Not on file Active member of club or organization: Not on file Attends meetings of clubs or organizations: Not on file Relationship status: Not on file  Intimate partner violence:  
  Fear of current or ex partner: Not on file Emotionally abused: Not on file Physically abused: Not on file Forced sexual activity: Not on file Other Topics Concern   Service No  
 Blood Transfusions No  
 Caffeine Concern No  
 Occupational Exposure No  
 Hobby Hazards No  
 Sleep Concern No  
 Stress Concern No  
 Weight Concern No  
 Special Diet No  
 Back Care No  
 Exercise Yes  Bike Helmet No  
 Seat Belt Yes  Self-Exams No  
Social History Narrative  Not on file ALLERGIES: Patient has no known allergies. Review of Systems Constitutional: Negative for fever. HENT: Negative for sore throat. Respiratory: Negative for shortness of breath. Cardiovascular: Negative for chest pain. Gastrointestinal: Negative for abdominal pain. Endocrine: Negative for polyuria. Genitourinary: Negative for difficulty urinating. Musculoskeletal: Positive for neck pain and neck stiffness. Negative for gait problem. Skin: Negative for rash. Allergic/Immunologic: Negative for immunocompromised state. Neurological: Negative for dizziness, syncope, speech difficulty, weakness, numbness and headaches. Psychiatric/Behavioral: Positive for sleep disturbance. Vitals:  
 04/25/19 0439 04/25/19 0441 04/25/19 0445 04/25/19 0500 BP: (!) 227/87  (!) 217/74 (!) 213/69 Pulse: 62  (!) 54 (!) 55 Resp: 17  15 16 Temp: 98.1 °F (36.7 °C) SpO2: 100% 100% 100% 100% Weight:      
Height:      
      
 
Physical Exam  
 Constitutional: She is oriented to person, place, and time. She appears well-developed and well-nourished. No distress. HENT:  
Head: Normocephalic and atraumatic. Right Ear: External ear normal.  
Left Ear: External ear normal.  
Nose: Nose normal.  
Mouth/Throat: Uvula is midline, oropharynx is clear and moist and mucous membranes are normal.  
Eyes: Conjunctivae are normal. No scleral icterus. Neck: Neck supple. Pain returns when patient moves her head to the left side. There is no associated neurologic sensation when she does this. Cardiovascular: Normal rate, regular rhythm, normal heart sounds and intact distal pulses. Pulmonary/Chest: Effort normal and breath sounds normal.  
Abdominal: Soft. There is no tenderness. Musculoskeletal: She exhibits no edema. Neurological: She is alert and oriented to person, place, and time. She has normal strength. A cranial nerve deficit (Patient has a left facial droop which does not appear significantly changed to patient. Her other cranial nerves are intact.) is present. No sensory deficit. She exhibits normal muscle tone. Coordination and gait normal.  
Skin: Skin is warm and dry. Capillary refill takes less than 2 seconds. She is not diaphoretic. Psychiatric: Her behavior is normal.  
Nursing note and vitals reviewed. MDM Procedures Vitals: 
Patient Vitals for the past 12 hrs: 
 Temp Pulse Resp BP SpO2  
04/25/19 0500  (!) 55 16 (!) 213/69 100 % 04/25/19 0445  (!) 54 15 (!) 217/74 100 % 04/25/19 0441     100 % 04/25/19 0439 98.1 °F (36.7 °C) 62 17 (!) 227/87 100 % 04/25/19 0438 98 °F (36.7 °C)     Medications ordered:  
Medications  
acetaminophen (TYLENOL) tablet 1,000 mg (1,000 mg Oral Given 4/25/19 0459) diazePAM (VALIUM) tablet 2.5 mg (2.5 mg Oral Given 4/25/19 0459) hydrALAZINE (APRESOLINE) 20 mg/mL injection 10 mg (10 mg IntraVENous Given 4/25/19 9315) Lab findings: Recent Results (from the past 12 hour(s)) CBC WITH AUTOMATED DIFF Collection Time: 04/25/19  4:47 AM  
Result Value Ref Range WBC 7.0 4.6 - 13.2 K/uL  
 RBC 4.44 4.20 - 5.30 M/uL  
 HGB 13.7 12.0 - 16.0 g/dL HCT 40.0 35.0 - 45.0 % MCV 90.1 74.0 - 97.0 FL  
 MCH 30.9 24.0 - 34.0 PG  
 MCHC 34.3 31.0 - 37.0 g/dL  
 RDW 13.5 11.6 - 14.5 % PLATELET 990 058 - 851 K/uL MPV 11.9 (H) 9.2 - 11.8 FL  
 NEUTROPHILS 45 40 - 73 % LYMPHOCYTES 45 21 - 52 % MONOCYTES 8 3 - 10 % EOSINOPHILS 2 0 - 5 % BASOPHILS 0 0 - 2 %  
 ABS. NEUTROPHILS 3.2 1.8 - 8.0 K/UL  
 ABS. LYMPHOCYTES 3.2 0.9 - 3.6 K/UL  
 ABS. MONOCYTES 0.6 0.05 - 1.2 K/UL  
 ABS. EOSINOPHILS 0.1 0.0 - 0.4 K/UL  
 ABS. BASOPHILS 0.0 0.0 - 0.1 K/UL  
 DF AUTOMATED METABOLIC PANEL, COMPREHENSIVE Collection Time: 04/25/19  4:47 AM  
Result Value Ref Range Sodium 140 136 - 145 mmol/L Potassium 3.5 3.5 - 5.5 mmol/L Chloride 107 100 - 108 mmol/L  
 CO2 25 21 - 32 mmol/L Anion gap 8 3.0 - 18 mmol/L Glucose 147 (H) 74 - 99 mg/dL BUN 14 7.0 - 18 MG/DL Creatinine 0.70 0.6 - 1.3 MG/DL  
 BUN/Creatinine ratio 20 12 - 20 GFR est AA >60 >60 ml/min/1.73m2 GFR est non-AA >60 >60 ml/min/1.73m2 Calcium 9.0 8.5 - 10.1 MG/DL Bilirubin, total 0.3 0.2 - 1.0 MG/DL  
 ALT (SGPT) 20 13 - 56 U/L  
 AST (SGOT) 13 (L) 15 - 37 U/L Alk. phosphatase 98 45 - 117 U/L Protein, total 7.5 6.4 - 8.2 g/dL Albumin 3.8 3.4 - 5.0 g/dL Globulin 3.7 2.0 - 4.0 g/dL A-G Ratio 1.0 0.8 - 1.7    
TROPONIN I Collection Time: 04/25/19  4:47 AM  
Result Value Ref Range Troponin-I, QT <0.02 0.0 - 0.045 NG/ML  
 
 
EKG interpretation by ED Physician: X-Ray, CT or other radiology findings or impressions: No orders to display Progress notes, Consult notes or additional Procedure notes: Most consistent with muscular related pain. Do not feel patient requires any angiographic studies nor stroke evaluation. Note sent to pcp for follow up on blood pressure I have discussed with patient and/or family/sig other the results, interpretation of any imaging if performed, suspected diagnosis and treatment plan to include instructions regarding the diagnoses listed to which understanding was expressed with all questions answered Reevaluation of patient:  
stable Disposition: 
Diagnosis: 1. Neck pain 2. Hypertension, accelerated Disposition: home Follow-up Information Follow up With Specialties Details Why Contact Info Betsy Johnson MD Internal Medicine Schedule an appointment as soon as possible for a visit within next week for recheck of your blood pressure [address] [facility] [address] [id number] [phone] [facility] EMERGENCY DEPT Emergency Medicine  If symptoms worsen [address] 
[phone] Patient's Medications Start Taking ACETAMINOPHEN (TYLENOL EXTRA STRENGTH) 500 MG TABLET    Take 2 Tabs by mouth every six (6) hours as needed for Pain. DIAZEPAM (VALIUM) 2 MG TABLET    Take 1 Tab by mouth every twelve (12) hours as needed (neck pain). Max Daily Amount: 4 mg. Continue Taking AMLODIPINE (NORVASC) 10 MG TABLET    TAKE 1 TABLET BY MOUTH ONCE A DAY ASPIRIN 81 MG CHEWABLE TABLET    CHEW 1 TABLET BY MOUTH ONCE A DAY  
 ATORVASTATIN (LIPITOR) 80 MG TABLET    Take 1 Tab by mouth nightly. CALCIUM-CHOLECALCIFEROL, D3, (CALCIUM 600 + D) TABLET    Take 1 Tab by mouth two (2) times a day. CHLORTHALIDONE (HYGROTEN) 25 MG TABLET    Take  by mouth daily. LOSARTAN (COZAAR) 50 MG TABLET    Take 1 Tab by mouth daily. METFORMIN ER (GLUCOPHAGE XR) 750 MG TABLET    Take 1 Tab by mouth daily. METOPROLOL SUCCINATE (TOPROL-XL) 25 MG XL TABLET    Take 1 Tab by mouth daily. POTASSIUM CHLORIDE (KLOR-CON) 20 MEQ PACK    Take 1 Packet by mouth every other day. These Medications have changed No medications on file Stop Taking No medications on file

## 2019-04-25 NOTE — DISCHARGE INSTRUCTIONS
Patient Education        Neck Pain: Care Instructions  Your Care Instructions    You can have neck pain anywhere from the bottom of your head to the top of your shoulders. It can spread to the upper back or arms. Injuries, painting a ceiling, sleeping with your neck twisted, staying in one position for too long, and many other activities can cause neck pain. Most neck pain gets better with home care. Your doctor may recommend medicine to relieve pain or relax your muscles. He or she may suggest exercise and physical therapy to increase flexibility and relieve stress. You may need to wear a special (cervical) collar to support your neck for a day or two. Follow-up care is a key part of your treatment and safety. Be sure to make and go to all appointments, and call your doctor if you are having problems. It's also a good idea to know your test results and keep a list of the medicines you take. How can you care for yourself at home? · Try using a heating pad on a low or medium setting for 15 to 20 minutes every 2 or 3 hours. Try a warm shower in place of one session with the heating pad. · You can also try an ice pack for 10 to 15 minutes every 2 to 3 hours. Put a thin cloth between the ice and your skin. · Take pain medicines exactly as directed. ? If the doctor gave you a prescription medicine for pain, take it as prescribed. ? If you are not taking a prescription pain medicine, ask your doctor if you can take an over-the-counter medicine. · If your doctor recommends a cervical collar, wear it exactly as directed. When should you call for help? Call your doctor now or seek immediate medical care if:    · You have new or worsening numbness in your arms, buttocks or legs.     · You have new or worsening weakness in your arms or legs.  (This could make it hard to stand up.)     · You lose control of your bladder or bowels.    Watch closely for changes in your health, and be sure to contact your doctor if:    · Your neck pain is getting worse.     · You are not getting better after 1 week.     · You do not get better as expected. Where can you learn more? Go to http://shashank-louie.info/. Enter [id number] in the search box to learn more about \"Neck Pain: Care Instructions. \"  Current as of: September 20, 2018  Content Version: 11.9  © 2006-2018 [employer]. Care instructions adapted under license by [employer] (which disclaims liability or warranty for this information). If you have questions about a medical condition or this instruction, always ask your healthcare professional. [place] [address] any warranty or liability for your use of this information. Patient Education        Acute High Blood Pressure: Care Instructions  Your Care Instructions    Acute high blood pressure is very high blood pressure. It's a serious problem. Very high blood pressure can damage your brain, heart, eyes, and kidneys. You may have been given medicines to lower your blood pressure. You may have gotten them as pills or through a needle in one of your veins. This is called an IV. And maybe you were given other medicines too. These can be needed when high blood pressure causes other problems. To keep your blood pressure at a lower level, you may need to make healthy lifestyle changes. And you will probably need to take medicines. Be sure to follow up with your doctor about your blood pressure and what you can do about it. Follow-up care is a key part of your treatment and safety. Be sure to make and go to all appointments, and call your doctor if you are having problems. It's also a good idea to know your test results and keep a list of the medicines you take. How can you care for yourself at home? · See your doctor as often as he or she recommends. This is to make sure your blood pressure is under control. You will probably go at least 2 times a year.  But it may be more often at first.  · Take your blood pressure medicine exactly as prescribed. You may take one or more types. They include diuretics, beta-blockers, ACE inhibitors, calcium channel blockers, and angiotensin II receptor blockers. Call your doctor if you think you are having a problem with your medicine. · If you take blood pressure medicine, talk to your doctor before you take decongestants or anti-inflammatory medicine, such as ibuprofen. These can raise blood pressure. · Learn how to check your blood pressure at home. Check it often. · Ask your doctor if it's okay to drink alcohol. · Talk to your doctor about lifestyle changes that can help blood pressure. These include being active and not smoking. When should you call for help? Call 911 anytime you think you may need emergency care. This may mean having symptoms that suggest that your blood pressure is causing a serious heart or blood vessel problem. Your blood pressure may be over 180/120.   For example, call 911 if:    · You have symptoms of a heart attack. These may include:  ? Chest pain or pressure, or a strange feeling in the chest.  ? Sweating. ? Shortness of breath. ? Nausea or vomiting. ? Pain, pressure, or a strange feeling in the back, neck, jaw, or upper belly or in one or both shoulders or arms. ? Lightheadedness or sudden weakness. ? A fast or irregular heartbeat.     · You have symptoms of a stroke. These may include:  ? Sudden numbness, tingling, weakness, or loss of movement in your face, arm, or leg, especially on only one side of your body. ? Sudden vision changes. ? Sudden trouble speaking. ? Sudden confusion or trouble understanding simple statements. ? Sudden problems with walking or balance. ? A sudden, severe headache that is different from past headaches.     · You have severe back or belly pain.    Do not wait until your blood pressure comes down on its own.  Get help right away.   Call your doctor now or seek immediate care if:    · Your blood pressure is much higher than normal (such as 180/120 or higher), but you don't have symptoms.     · You think high blood pressure is causing symptoms, such as:  ? Severe headache.  ? Blurry vision.    Watch closely for changes in your health, and be sure to contact your doctor if:    · Your blood pressure measures higher than your doctor recommends at least 2 times. That means the top number is higher or the bottom number is higher, or both.     · You think you may be having side effects from your blood pressure medicine. Where can you learn more? Go to http://shashank-louie.info/. Enter R727 in the search box to learn more about \"Acute High Blood Pressure: Care Instructions. \"  Current as of: July 22, 2018  Content Version: 11.9  © 1982-4993 [employer]. Care instructions adapted under license by [employer] (which disclaims liability or warranty for this information). If you have questions about a medical condition or this instruction, always ask your healthcare professional. [address] any warranty or liability for your use of this information.

## 2019-04-25 NOTE — ED NOTES
Discharge information reviewed with patient, patient verbalized understanding. Paperwork signed, no further questions.

## 2019-04-29 ENCOUNTER — OFFICE VISIT (OUTPATIENT)
Dept: FAMILY MEDICINE CLINIC | Facility: CLINIC | Age: 84
End: 2019-04-29

## 2019-04-29 VITALS
SYSTOLIC BLOOD PRESSURE: 160 MMHG | RESPIRATION RATE: 15 BRPM | OXYGEN SATURATION: 99 % | HEART RATE: 87 BPM | WEIGHT: 132 LBS | TEMPERATURE: 97.8 F | BODY MASS INDEX: 26.61 KG/M2 | HEIGHT: 59 IN | DIASTOLIC BLOOD PRESSURE: 70 MMHG

## 2019-04-29 DIAGNOSIS — I10 ESSENTIAL HYPERTENSION: Primary | ICD-10-CM

## 2019-04-29 DIAGNOSIS — R80.9 CONTROLLED TYPE 2 DIABETES MELLITUS WITH MICROALBUMINURIA, WITHOUT LONG-TERM CURRENT USE OF INSULIN (HCC): ICD-10-CM

## 2019-04-29 DIAGNOSIS — E66.3 OVERWEIGHT (BMI 25.0-29.9): ICD-10-CM

## 2019-04-29 DIAGNOSIS — E11.29 CONTROLLED TYPE 2 DIABETES MELLITUS WITH MICROALBUMINURIA, WITHOUT LONG-TERM CURRENT USE OF INSULIN (HCC): ICD-10-CM

## 2019-04-29 PROBLEM — E55.9 VITAMIN D DEFICIENCY: Status: RESOLVED | Noted: 2017-12-27 | Resolved: 2019-04-29

## 2019-04-29 RX ORDER — LOSARTAN POTASSIUM 100 MG/1
100 TABLET ORAL DAILY
Qty: 90 TAB | Refills: 3 | Status: SHIPPED | OUTPATIENT
Start: 2019-04-29 | End: 2019-08-16 | Stop reason: SDUPTHER

## 2019-04-29 NOTE — PROGRESS NOTES
Nathanael Moran is a 80 y.o.  female presents today for office visit for follow up. Pt would also like to discuss HTN. Pt is not fasting. Pt is in Room # 3      1. Have you been to the ER, urgent care clinic since your last visit? Hospitalized since your last visit? No    2. Have you seen or consulted any other health care providers outside of the [address] [place] [address] since your last visit? Include any pap smears or colon screening. No    Upcoming Appts  none    Health Maintenance reviewed       VORB: No orders of the defined types were placed in this encounter.   Khadijah Vidal, Radha Espinoza LPN

## 2019-04-29 NOTE — PROGRESS NOTES
Internal Medicine Progress Note    Today's Date:  2017   Patient:  Cindy Mccormick  Patient :  1932    Subjective:     Chief Complaint   Patient presents with    Hypertension    Blood sugar problem    Weight Management     Hypertension   This is a chronic problem. BP is not at goal.  Pt is on losartan, norvasc and toprol. Pt reports compliance with these medications. Pt brought all her prescription bottles to her visit today.     Diabetes mellitus  This is a chronic problem. This is controlled. Pt takes metformin. Pt is on aspirin and statin.  +microalbuminuria on an ARB. Yuli Luz is cost prohibitive. Overweight  This is a chronic problem. This is not at goal. Pt exercises regularly. Pt tries to eat a healthy diet. Information given on ketogenic/IF diet during a previous visit but pt is not doing this. 3 most recent PHQ Screens 2019   PHQ Not Done -   Little interest or pleasure in doing things Not at all   Feeling down, depressed, irritable, or hopeless Not at all   Total Score PHQ 2 0     Past Medical History:   Diagnosis Date    Advance directive discussed with patient 10/12/2016    Cigarette nicotine dependence in remission 10/12/2016    Dizziness 3/16/2017    Essential hypertension 10/3/2016    Microalbuminuria 2017    Osteopenia 10/3/2016    Osteoporosis     Pure hypercholesterolemia 2017    Type II diabetes mellitus ([facility] [address] 10/3/2016    Vitamin D deficiency 2017     History reviewed. No pertinent surgical history. reports that she has never smoked. She has never used smokeless tobacco. She reports that she does not drink alcohol or use illicit drugs.     Family History   Problem Relation Age of Onset    Hypertension Mother     Cancer Mother      pancreas    Cancer Father      lung     No Known Allergies    Review of Systems   CV:      chest pain, palpitations  PULM:  SOB, wheezing, cough, sputum production    Current Outpatient Meds and Allergies Current Outpatient Prescriptions on File Prior to Visit   Medication Sig Dispense Refill    valsartan-hydroCHLOROthiazide (DIOVAN-HCT) 320-25 mg per tablet Take 1 Tab by mouth daily.  pravastatin (PRAVACHOL) 40 mg tablet Take 40 mg by mouth nightly.  metoprolol succinate (TOPROL-XL) 25 mg XL tablet Take 1 Tab by mouth daily. 90 Tab 3    metFORMIN (GLUCOPHAGE) 500 mg tablet Take 1 Tab by mouth two (2) times daily (with meals). 180 Tab 3    glucose blood VI test strips (BLOOD GLUCOSE TEST) strip Check BS BID. Dx code: E11.65 200 Strip 3    calcium-cholecalciferol, D3, (CALCIUM 600 + D) tablet Take 1 Tab by mouth two (2) times a day. 180 Tab 3    aspirin delayed-release 81 mg tablet Take  by mouth daily. No current facility-administered medications on file prior to visit.       No Known Allergies    Objective:       Visit Vitals    BP (!) 154/98 (BP 1 Location: Left arm, BP Patient Position: Sitting)  Comment (BP 1 Location): manual    Pulse 62    Temp 96.9 °F (36.1 °C) (Oral)    Resp 20    Ht 4' 11\" (1.499 m)    Wt 128 lb 3.2 oz (58.2 kg)    SpO2 100%    BMI 25.89 kg/m2     General:   Well-nourished, well-groomed, pleasant, alert, in no acute distress  Ears:  External ears WNL, TMs WNL  Eyes:  EOMI, PERRL  Nose:  External nares WNL  Psych:  No pressured speech, no abnormal thought content    Lab Results   Component Value Date/Time    Hemoglobin A1c 7.5 (H) 10/03/2018 10:30 AM    Hemoglobin A1c 8.7 (H) 04/21/2017 01:33 PM    Hemoglobin A1c 8.6 (H) 03/01/2017 09:42 AM    Hemoglobin A1c, External 9.2 05/20/2016    Glucose 147 (H) 04/25/2019 04:47 AM    Glucose  03/27/2018 11:24 AM    Microalbumin/Creat ratio (mg/g creat) 114 (H) 10/25/2017 12:00 PM    Microalbumin,urine random 8.70 (H) 10/25/2017 12:00 PM    LDL, calculated 152.2 (H) 10/03/2018 10:30 AM    Creatinine, POC 0.6 04/04/2017 08:29 AM    Creatinine 0.70 04/25/2019 04:47 AM      Assessment/Plan & Orders:         ICD-10-CM ICD-9-CM    1. Essential hypertension I10 401.9    2. Controlled type 2 diabetes mellitus with microalbuminuria, without long-term current use of insulin (HCC) E11.29 250.40     R80.9 791.0    3. Overweight (BMI 25.0-29. 9) E66.3 278.02       Continue checking BP at home. Call us if not at goal of 150/90  Bring BP log to next appt    Follow-up and Dispositions    · Return in about 1 week (around 5/6/2019) for Hypertension, Diabetes mellitus, Weight management. *Patient verbalized understanding and agreement with the plan. Patient was given an after-visit summary. Karine Morrison.  [address], MD - Internal Medicine  9/27/2017, 11:20 AM  [facility]  [address] [facility] [place], [address]  Phone [phone]  Fax [phone]

## 2019-05-22 ENCOUNTER — OFFICE VISIT (OUTPATIENT)
Dept: FAMILY MEDICINE CLINIC | Facility: CLINIC | Age: 84
End: 2019-05-22

## 2019-05-22 VITALS
HEART RATE: 85 BPM | RESPIRATION RATE: 15 BRPM | SYSTOLIC BLOOD PRESSURE: 142 MMHG | BODY MASS INDEX: 27.01 KG/M2 | TEMPERATURE: 97.7 F | WEIGHT: 134 LBS | HEIGHT: 59 IN | OXYGEN SATURATION: 97 % | DIASTOLIC BLOOD PRESSURE: 80 MMHG

## 2019-05-22 DIAGNOSIS — I10 ESSENTIAL HYPERTENSION: ICD-10-CM

## 2019-05-22 DIAGNOSIS — E11.29 CONTROLLED TYPE 2 DIABETES MELLITUS WITH MICROALBUMINURIA, WITHOUT LONG-TERM CURRENT USE OF INSULIN (HCC): Primary | ICD-10-CM

## 2019-05-22 DIAGNOSIS — E66.3 OVERWEIGHT (BMI 25.0-29.9): ICD-10-CM

## 2019-05-22 DIAGNOSIS — R80.9 CONTROLLED TYPE 2 DIABETES MELLITUS WITH MICROALBUMINURIA, WITHOUT LONG-TERM CURRENT USE OF INSULIN (HCC): Primary | ICD-10-CM

## 2019-05-22 LAB — HBA1C MFR BLD HPLC: 7.3 %

## 2019-05-22 NOTE — PROGRESS NOTES
Kiera Louis is a 80 y.o.  female presents today for office visit for follow up. Pt would also like to discuss HTN. Pt is not fasting. Pt is in Room # 3      1. Have you been to the ER, urgent care clinic since your last visit? Hospitalized since your last visit? No    2. Have you seen or consulted any other health care providers outside of the [address] [place] [address] since your last visit? Include any pap smears or colon screening. No    Upcoming Appts  none    Health Maintenance reviewed     VORB: No orders of the defined types were placed in this encounter.   Matilda Dickens, JULIANN

## 2019-05-22 NOTE — PROGRESS NOTES
Internal Medicine Progress Note    Today's Date:  2017   Patient:  Moira Albright  Patient :  1932    Subjective:     Chief Complaint   Patient presents with    Hypertension    Diabetes     BS:160     Weight Management     Hypertension   This is a chronic problem. BP is at goal.  Pt is on losartan, norvasc and toprol. Pt reports compliance with these medications.      Diabetes mellitus  This is a chronic problem. This is controlled. Pt takes metformin. Pt is on aspirin and statin.  +microalbuminuria on an ARB. [place] is cost prohibitive. Lab Results   Component Value Date/Time    Hemoglobin A1c 7.5 (H) 10/03/2018 10:30 AM    Hemoglobin A1c (POC) 7.4 2019 10:23 AM    Hemoglobin A1c, External 9.2 2016     Overweight  This is a chronic problem. This is not at goal. Pt exercises regularly. Pt tries to eat a healthy diet. Information given on ketogenic/IF diet during a previous visit but pt is not doing this. 3 most recent PHQ Screens 2019   PHQ Not Done -   Little interest or pleasure in doing things Not at all   Feeling down, depressed, irritable, or hopeless Not at all   Total Score PHQ 2 0     Past Medical History:   Diagnosis Date    Advance directive discussed with patient 10/12/2016    Cigarette nicotine dependence in remission 10/12/2016    Dizziness 3/16/2017    Essential hypertension 10/3/2016    Microalbuminuria 2017    Osteopenia 10/3/2016    Osteoporosis     Pure hypercholesterolemia 2017    Type II diabetes mellitus ([facility] [address] 10/3/2016    Vitamin D deficiency 2017     History reviewed. No pertinent surgical history. reports that she has never smoked. She has never used smokeless tobacco. She reports that she does not drink alcohol or use illicit drugs.     Family History   Problem Relation Age of Onset    Hypertension Mother     Cancer Mother      pancreas    Cancer Father      lung     No Known Allergies    Review of Systems   CV: chest pain, palpitations  PULM:  SOB, wheezing, cough, sputum production    Current Outpatient Meds and Allergies     Current Outpatient Prescriptions on File Prior to Visit   Medication Sig Dispense Refill    valsartan-hydroCHLOROthiazide (DIOVAN-HCT) 320-25 mg per tablet Take 1 Tab by mouth daily.  pravastatin (PRAVACHOL) 40 mg tablet Take 40 mg by mouth nightly.  metoprolol succinate (TOPROL-XL) 25 mg XL tablet Take 1 Tab by mouth daily. 90 Tab 3    metFORMIN (GLUCOPHAGE) 500 mg tablet Take 1 Tab by mouth two (2) times daily (with meals). 180 Tab 3    glucose blood VI test strips (BLOOD GLUCOSE TEST) strip Check BS BID. Dx code: E11.65 200 Strip 3    calcium-cholecalciferol, D3, (CALCIUM 600 + D) tablet Take 1 Tab by mouth two (2) times a day. 180 Tab 3    aspirin delayed-release 81 mg tablet Take  by mouth daily. No current facility-administered medications on file prior to visit.       No Known Allergies    Objective:       Visit Vitals    BP (!) 154/98 (BP 1 Location: Left arm, BP Patient Position: Sitting)  Comment (BP 1 Location): manual    Pulse 62    Temp 96.9 °F (36.1 °C) (Oral)    Resp 20    Ht 4' 11\" (1.499 m)    Wt 128 lb 3.2 oz (58.2 kg)    SpO2 100%    BMI 25.89 kg/m2     General:   Well-nourished, well-groomed, pleasant, alert, in no acute distress  Ears:  External ears WNL, TMs WNL  Eyes:  EOMI, PERRL  Nose:  External nares WNL  Psych:  No pressured speech, no abnormal thought content    Lab Results   Component Value Date/Time    Hemoglobin A1c 7.5 (H) 10/03/2018 10:30 AM    Hemoglobin A1c 8.7 (H) 04/21/2017 01:33 PM    Hemoglobin A1c 8.6 (H) 03/01/2017 09:42 AM    Hemoglobin A1c, External 9.2 05/20/2016    Glucose 147 (H) 04/25/2019 04:47 AM    Glucose  03/27/2018 11:24 AM    Microalbumin/Creat ratio (mg/g creat) 114 (H) 10/25/2017 12:00 PM    Microalbumin,urine random 8.70 (H) 10/25/2017 12:00 PM    LDL, calculated 152.2 (H) 10/03/2018 10:30 AM    Creatinine, POC 0.6 04/04/2017 08:29 AM    Creatinine 0.70 04/25/2019 04:47 AM      Assessment/Plan & Orders:         ICD-10-CM ICD-9-CM    1. Controlled type 2 diabetes mellitus with microalbuminuria, without long-term current use of insulin (HCC) E11.29 250.40 AMB POC HEMOGLOBIN A1C    R80.9 791.0    2. Essential hypertension I10 401.9    3. Overweight (BMI 25.0-29. 9) E66.3 278.02       Continue checking BP at home. Call us if not at goal of 150/90  Bring BP log to next appt  Continue current medications    Follow-up and Dispositions    · Return in about 6 months (around 11/22/2019) for Diabetes mellitus, Hypertension, Weight management. *Patient verbalized understanding and agreement with the plan. Patient was given an after-visit summary. Senait Montano.  Rui1 JOSE Donaldson MD - Internal Medicine  9/27/2017, 11:20 AM  [facility]  [facility] [place] Abdi, [address]  Phone [phone]  Fax [phone]

## 2019-06-11 ENCOUNTER — OFFICE VISIT (OUTPATIENT)
Dept: FAMILY MEDICINE CLINIC | Facility: CLINIC | Age: 84
End: 2019-06-11

## 2019-06-11 VITALS
BODY MASS INDEX: 27.13 KG/M2 | WEIGHT: 134.6 LBS | RESPIRATION RATE: 15 BRPM | SYSTOLIC BLOOD PRESSURE: 160 MMHG | OXYGEN SATURATION: 98 % | HEIGHT: 59 IN | HEART RATE: 54 BPM | TEMPERATURE: 97 F | DIASTOLIC BLOOD PRESSURE: 90 MMHG

## 2019-06-11 DIAGNOSIS — I10 ESSENTIAL HYPERTENSION, BENIGN: ICD-10-CM

## 2019-06-11 DIAGNOSIS — E66.3 OVERWEIGHT (BMI 25.0-29.9): ICD-10-CM

## 2019-06-11 DIAGNOSIS — H81.10 BENIGN PAROXYSMAL POSITIONAL VERTIGO, UNSPECIFIED LATERALITY: Primary | ICD-10-CM

## 2019-06-11 RX ORDER — MECLIZINE HCL 12.5 MG 12.5 MG/1
12.5 TABLET ORAL
Qty: 30 TAB | Refills: 0 | Status: SHIPPED | OUTPATIENT
Start: 2019-06-11 | End: 2019-06-21

## 2019-06-11 NOTE — PROGRESS NOTES
Kavita Palomino is a 80 y.o.  female presents today for office visit for follow up. Pt would also like to discuss medication eval. Pt is not fasting. Pt is in Room # 2      1. Have you been to the ER, urgent care clinic since your last visit? Hospitalized since your last visit? No    2. Have you seen or consulted any other health care providers outside of the [address] [place] [address] since your last visit? Include any pap smears or colon screening. No    Upcoming Appts  none    Health Maintenance reviewed      VORB: No orders of the defined types were placed in this encounter.   Olinda Sanchez, JULIANN

## 2019-06-11 NOTE — PROGRESS NOTES
Internal Medicine Progress Note    Today's Date:  2017   Patient:  Hoda Castro  Patient :  1932    Subjective:     Chief Complaint   Patient presents with    Hypertension    Medication Evaluation     norvasc may be causing the dizziness    Dizziness     started about 6mths ago      Hypertension   This is a chronic problem. BP is not at goal.  Pt is on losartan, norvasc and toprol. Pt reports compliance with these medications except norvasc because she believes this is causing her dizziness.      Diabetes mellitus  This is a chronic problem. This is controlled. Pt takes metformin. Pt is on aspirin and statin.  +microalbuminuria on an ARB. [employer] is cost prohibitive. Lab Results   Component Value Date/Time    Hemoglobin A1c 7.5 (H) 10/03/2018 10:30 AM    Hemoglobin A1c (POC) 7.3 2019 10:10 AM    Hemoglobin A1c, External 9.2 2016     Overweight  This is a chronic problem. This is not at goal. Pt exercises regularly. Pt tries to eat a healthy diet. Information given on ketogenic/IF diet during a previous visit but pt is not doing this. 3 most recent PHQ Screens 2019   PHQ Not Done -   Little interest or pleasure in doing things Not at all   Feeling down, depressed, irritable, or hopeless Not at all   Total Score PHQ 2 0     Past Medical History:   Diagnosis Date    Advance directive discussed with patient 10/12/2016    Cigarette nicotine dependence in remission 10/12/2016    Dizziness 3/16/2017    Essential hypertension 10/3/2016    Microalbuminuria 2017    Osteopenia 10/3/2016    Osteoporosis     Pure hypercholesterolemia 2017    Type II diabetes mellitus ([facility] [address] 10/3/2016    Vitamin D deficiency 2017     History reviewed. No pertinent surgical history. reports that she has never smoked. She has never used smokeless tobacco. She reports that she does not drink alcohol or use illicit drugs.     Family History   Problem Relation Age of Onset    Hypertension Mother     Cancer Mother      pancreas    Cancer Father      lung     No Known Allergies    Review of Systems   CV:      chest pain, palpitations  PULM:  SOB, wheezing, cough, sputum production    Current Outpatient Meds and Allergies     Current Outpatient Prescriptions on File Prior to Visit   Medication Sig Dispense Refill    valsartan-hydroCHLOROthiazide (DIOVAN-HCT) 320-25 mg per tablet Take 1 Tab by mouth daily.  pravastatin (PRAVACHOL) 40 mg tablet Take 40 mg by mouth nightly.  metoprolol succinate (TOPROL-XL) 25 mg XL tablet Take 1 Tab by mouth daily. 90 Tab 3    metFORMIN (GLUCOPHAGE) 500 mg tablet Take 1 Tab by mouth two (2) times daily (with meals). 180 Tab 3    glucose blood VI test strips (BLOOD GLUCOSE TEST) strip Check BS BID. Dx code: E11.65 200 Strip 3    calcium-cholecalciferol, D3, (CALCIUM 600 + D) tablet Take 1 Tab by mouth two (2) times a day. 180 Tab 3    aspirin delayed-release 81 mg tablet Take  by mouth daily. No current facility-administered medications on file prior to visit.       No Known Allergies    Objective:       Visit Vitals    BP (!) 154/98 (BP 1 Location: Left arm, BP Patient Position: Sitting)  Comment (BP 1 Location): manual    Pulse 62    Temp 96.9 °F (36.1 °C) (Oral)    Resp 20    Ht 4' 11\" (1.499 m)    Wt 128 lb 3.2 oz (58.2 kg)    SpO2 100%    BMI 25.89 kg/m2     General:   Well-nourished, well-groomed, pleasant, alert, in no acute distress  Ears:  External ears WNL, TMs WNL  Eyes:  EOMI, PERRL  Nose:  External nares WNL  Psych:  No pressured speech, no abnormal thought content  [facility] manuever: no dizziness with laying back on the right or left, no nystagmus    Lab Results   Component Value Date/Time    Hemoglobin A1c 7.5 (H) 10/03/2018 10:30 AM    Hemoglobin A1c 8.7 (H) 04/21/2017 01:33 PM    Hemoglobin A1c 8.6 (H) 03/01/2017 09:42 AM    Hemoglobin A1c, External 9.2 05/20/2016    Glucose 147 (H) 04/25/2019 04:47 AM    Glucose  03/27/2018 11:24 AM    Microalbumin/Creat ratio (mg/g creat) 114 (H) 10/25/2017 12:00 PM    Microalbumin,urine random 8.70 (H) 10/25/2017 12:00 PM    LDL, calculated 152.2 (H) 10/03/2018 10:30 AM    Creatinine, POC 0.6 04/04/2017 08:29 AM    Creatinine 0.70 04/25/2019 04:47 AM      Assessment/Plan & Orders:         ICD-10-CM ICD-9-CM    1. Benign paroxysmal positional vertigo, unspecified laterality H81.10 386.11    2. Essential hypertension, benign I10 401.1    3. Overweight (BMI 25.0-29. 9) E66.3 278.02       Continue checking BP at home. Call us if not at goal of 150/90  Continue current medications including norvasc  Ok to restart norvasc at half dose but monitor BP    Follow-up and Dispositions    · Return in about 3 months (around 9/11/2019) for dizziness, Hypertension, Weight management, Diabetes mellitus. *Patient verbalized understanding and agreement with the plan. Patient was given an after-visit summary. Gretchen Palomares.  Reji Kaiser MD - Internal Medicine  9/27/2017, 11:20 AM  [facility]  [address], [address]  Phone [phone]  Fax [phone]

## 2019-06-11 NOTE — PATIENT INSTRUCTIONS
Epley Maneuver at Home for Vertigo: Exercises  Your Care Instructions  Vertigo is a spinning or whirling sensation when you move your head. Your doctor may have moved you in different positions to help your vertigo get better faster. This is called the Epley maneuver. Your doctor also may have asked you to do these exercises at home. Do the exercises as often as your doctor recommends. If your vertigo is getting worse, your doctor may have you change the exercise or stop it. Step 1  Step 1    1. Sit on the edge of a bed or sofa. Step 2    1. Turn your head 45 degrees in the direction your doctor told you to. This should be toward the ear that causes the most vertigo for you. In this picture, the woman is turning toward her left ear. Step 3    1. Tilt yourself backward until you are lying on your back. Your head should still be at a 45-degree turn. Your head should be about midway between looking straight ahead and looking out to your side. Hold for 30 seconds. If you have vertigo, stay in this position until it stops. Step 4    1. Turn your head 90 degrees toward the ear that has the least vertigo. In this picture, the woman is turning to the right because she has vertigo on her left side. The point of your chin should be raised and over your shoulder. Hold for 30 seconds. Step 5    1. Roll onto the side with the least vertigo. You should now be looking at the floor. Hold for 30 seconds. Follow-up care is a key part of your treatment and safety. Be sure to make and go to all appointments, and call your doctor if you are having problems. It's also a good idea to know your test results and keep a list of the medicines you take. Where can you learn more? Go to http://shashank-louie.info/. Enter [id number] in the search box to learn more about \"Epley Maneuver at Home for Vertigo: Exercises. \"  Current as of: Ceci 3, 2018  Content Version: 11.9  © 1394-3932 [employer]. Care instructions adapted under license by [employer] (which disclaims liability or warranty for this information). If you have questions about a medical condition or this instruction, always ask your healthcare professional. [address] any warranty or liability for your use of this information.

## 2019-08-16 RX ORDER — LOSARTAN POTASSIUM 100 MG/1
100 TABLET ORAL DAILY
Qty: 90 TAB | Refills: 0 | Status: SHIPPED | OUTPATIENT
Start: 2019-08-16 | End: 2020-03-29

## 2019-08-16 NOTE — TELEPHONE ENCOUNTER
Patient called the office and stated that Pranay Ragland informed her that her medication will not be delivered until next week. She is going to be out of her medication and wanted to know if you can send in a temporary supply to her local [facility] pharmacy.

## 2019-09-04 ENCOUNTER — PATIENT OUTREACH (OUTPATIENT)
Dept: FAMILY MEDICINE CLINIC | Facility: CLINIC | Age: 84
End: 2019-09-04

## 2019-09-04 RX ORDER — CLOPIDOGREL BISULFATE 75 MG/1
TABLET ORAL
Refills: 2 | COMMUNITY
Start: 2019-08-30 | End: 2019-09-18 | Stop reason: SDUPTHER

## 2019-09-04 RX ORDER — LABETALOL 100 MG/1
TABLET, FILM COATED ORAL
Refills: 3 | COMMUNITY
Start: 2019-08-30 | End: 2021-01-20 | Stop reason: ALTCHOICE

## 2019-09-04 RX ORDER — ASPIRIN 325 MG
TABLET ORAL
Refills: 3 | COMMUNITY
Start: 2019-08-30

## 2019-09-04 NOTE — PROGRESS NOTES
Hospital Discharge Follow-Up      Date/Time:  2019 1:41 PM    Patient was [facility] and discharged on 19 for CVA. The physician discharge summary was not available at the time of outreach. Patient was contacted within 2 business days of discharge. Top Challenges reviewed with the provider   None noted at this time. Method of communication with provider :none    Inpatient RRAT score: unavailable  Was this a readmission? no   Patient stated reason for the readmission: n/a    Nurse Navigator (NN) contacted the patient by telephone to perform post hospital discharge assessment. Verified name and  with patient as identifiers. Provided introduction to self, and explanation of the Nurse Navigator role. Patient states that she is feeling way better now than before. Patient reported that she is taking her Dual Therapy Aspirin 325mg and Plavix without any S/S of bleeding. Patient states that she is also taking her Labetalol. Patient states that home health has been by for admission and will be back on Monday. Patient is aware that she is on Diabetic and low Salt diet. Made Pt. Aware of upcoming appt with PCP and  Neurology. F/U date given to Pt. Reviewed discharge instructions and red flags with patient who verbalized understanding. Patient given an opportunity to ask questions and does not have any further questions or concerns at this time. The patient agrees to contact the PCP office for questions related to their healthcare. NN provided contact information for future reference. Disease Specific:   N/A    Summary of patient's top problems:  1. CVA - stable at this time as per Pt. 2. HTN - Stable at this time as per Pt. 3. DM - Stable at this time as per Pt. A1C 7.8 on 19.     Home Health orders at discharge: PT, OT, [address]: [address] ordered/company: [id number] Equipment received: received prior to hospital discharge. Barriers to care? none at this time as per Pt. Advance Care Planning:   Does patient have an Advance Directive:  reviewed and current     Medication(s):   New Medications at Discharge: Taken from [facility] MD office Epic. Reviewed with Patient  START taking:  aspirin  Start taking on: 8/31/2019  This replaces a similar medication. See the full  medication list for instructions. clopidogrel (PLAVIX)  Start taking on: 8/31/2019  labetalol (NORMODYNE    Changed Medications at Discharge: none noted    Discontinued Medications at Discharge: Taken from [facility] MD office Epic. Reviewed with Patient  STOP taking:  aspirin 81 mg Chew  Replaced by a similar medication. metoprolol XL 25 mg Tb24 (TOPROL XL)  Your medications have changed today. Do not    Medication reconciliation was performed with patient, who verbalizes understanding of administration of home medications. There were no barriers to obtaining medications identified at this time. Referral to Pharm D needed: no     Current Outpatient Medications   Medication Sig    clopidogrel (PLAVIX) 75 mg tab TAKE 1 TABLET BY MOUTH EVERY DAY    labetalol (NORMODYNE) 100 mg tablet TAKE 1 TABLET BY MOUTH EVERY 12 HOURS    aspirin (ASPIRIN) 325 mg tablet TAKE 1 TABLET EVERY DAY    losartan (COZAAR) 100 mg tablet Take 1 Tab by mouth daily.  metFORMIN ER (GLUCOPHAGE XR) 750 mg tablet Take 1 Tab by mouth daily.  amLODIPine (NORVASC) 10 mg tablet TAKE 1 TABLET BY MOUTH ONCE A DAY    atorvastatin (LIPITOR) 80 mg tablet Take 1 Tab by mouth nightly.  calcium-cholecalciferol, D3, (CALCIUM 600 + D) tablet Take 1 Tab by mouth two (2) times a day.  metoprolol succinate (TOPROL-XL) 25 mg XL tablet Take 1 Tab by mouth daily.  aspirin 81 mg chewable tablet CHEW 1 TABLET BY MOUTH ONCE A DAY     No current facility-administered medications for this visit.         There are no discontinued medications. [facility] follow up appointment(s):   Future Appointments   Date Time Provider Celso Perkinsi   9/11/2019  2:30 PM Bob Lisa MD [address] [place] [address]      Non-[facility] follow up appointment(s):     [facility]:  [address] [place] [address] Attends follow-up appointments as directed. Patient will attend follow up appointment with Dr. Brittny Mesa on 9/11/19. Patient will attend follow up appointment with Dr. Ruben Gao Neurologist on 10/29/19.  Prevent complications post hospitalization. Patient will take Aspirin and Plavix as ordered. Patient will also take labetalol as ordered. Patient will follow up with PCP and Neurology. Patient will call NN and /or Dr. Brittny Mesa office for any questions and/or concerns.  Supportive resources in place to maintain patient in the community (ie. Home Health, DME equipment, refer to, medication assistant plan, etc.)      Understands red flags post discharge. Patient will go to the nearest emergency room for chest pain, shortness of breath, S/S of stroke  returning of symptoms that brought her to the emergency room and/or worsening of symptoms. Patient will contact PCP office for any questions or concerns related to healthcare. Recommendations: as per Jessica Dillon NP (Neurology) Taken from [facility] office EPic. \"DAPT for 3 months and thereafter continue patient on single antiplatelet therapy. Hgb a1c of goal of less than 7. Primary team to check free T4 in the setting of elevated TSH. LDL goal of less than 70. Statin ordered. Do not drop SBP lower than 140 mmHg in this short interval. Gradually lower SBP to normotensive ranges of 120 mmHg. Please have pt to follow-up with Dr. Ruben Gao at [facility]. She is to follow-up October 29 at 3:40 PM. For scheduling concerns have patient call [phone]. No additional recommendation; will sign off. \"

## 2019-09-11 ENCOUNTER — OFFICE VISIT (OUTPATIENT)
Dept: FAMILY MEDICINE CLINIC | Facility: CLINIC | Age: 84
End: 2019-09-11

## 2019-09-11 VITALS
TEMPERATURE: 97 F | HEART RATE: 60 BPM | OXYGEN SATURATION: 99 % | DIASTOLIC BLOOD PRESSURE: 70 MMHG | SYSTOLIC BLOOD PRESSURE: 180 MMHG | WEIGHT: 132 LBS | RESPIRATION RATE: 15 BRPM | BODY MASS INDEX: 26.61 KG/M2 | HEIGHT: 59 IN

## 2019-09-11 DIAGNOSIS — I63.311 CEREBROVASCULAR ACCIDENT (CVA) DUE TO THROMBOSIS OF RIGHT MIDDLE CEREBRAL ARTERY (HCC): Primary | ICD-10-CM

## 2019-09-11 DIAGNOSIS — E11.29 CONTROLLED TYPE 2 DIABETES MELLITUS WITH MICROALBUMINURIA, WITHOUT LONG-TERM CURRENT USE OF INSULIN (HCC): ICD-10-CM

## 2019-09-11 DIAGNOSIS — I10 ESSENTIAL HYPERTENSION: ICD-10-CM

## 2019-09-11 DIAGNOSIS — R80.9 CONTROLLED TYPE 2 DIABETES MELLITUS WITH MICROALBUMINURIA, WITHOUT LONG-TERM CURRENT USE OF INSULIN (HCC): ICD-10-CM

## 2019-09-11 LAB — HBA1C MFR BLD HPLC: 7.2 %

## 2019-09-11 NOTE — PROGRESS NOTES
Nafisa Tineo is a 80 y.o.  female presents today for office visit for follow up. Pt would also like to discuss dm. Pt is not fasting. Pt is in Room # 2      1. Have you been to the ER, urgent care clinic since your last visit? Hospitalized since your last visit? Aug 28 [place] jefe stroke     2. Have you seen or consulted any other health care providers outside of the [address] [place] [address] since your last visit? Include any pap smears or colon screening. No    Upcoming Appts  none    Health Maintenance reviewed       VORB: No orders of the defined types were placed in this encounter.   Marlo Marino LPN

## 2019-09-11 NOTE — PROGRESS NOTES
Internal Medicine Progress Note    Today's Date:  2017   Patient:  Newton Montano  Patient :  1932    Subjective:     Chief Complaint   Patient presents with   [facility] Follow Up     stroke Citlalyrahel dionna    Hypertension    Weight Management    Diabetes     BS: 136    Medication Refill     plavix     Transitional care   Pt was discharged from the hospital on 19. Nurse navigator spoke with the pt on 2019. (2019 was Labor Day which was a federal holiday). Pt was seen in the office on 2019. The complexity of this is moderate. Pt was admitted to the hospital and diagnosed with a CVA. Pt is on aspirin and plavix. Pt has an appt with neurology at the end of October. Hypertension   This is a chronic problem. BP is not at goal in the office. Pt is on losartan, norvasc and labetalol. Toprol was stopped during her recent hospital admission. Pt reports normal BP at home.      Diabetes mellitus  This is a chronic problem. This is controlled. Pt takes metformin. Pt is on aspirin and statin.  +microalbuminuria on an ARB. José Luis Ford is cost prohibitive. Lab Results   Component Value Date/Time    Hemoglobin A1c 7.5 (H) 10/03/2018 10:30 AM    Hemoglobin A1c (POC) 7.2 2019 02:24 PM    Hemoglobin A1c, External 9.2 2016     Overweight  This is a chronic problem. This is not at goal. Pt exercises regularly. Pt tries to eat a healthy diet. Information given on ketogenic/IF diet during a previous visit but pt is not doing this. Pt reports compliance with this medication.      3 most recent PHQ Screens 2019   PHQ Not Done -   Little interest or pleasure in doing things Not at all   Feeling down, depressed, irritable, or hopeless Not at all   Total Score PHQ 2 0     Past Medical History:   Diagnosis Date    Advance directive discussed with patient 10/12/2016    Cigarette nicotine dependence in remission 10/12/2016    Dizziness 3/16/2017    Essential hypertension 10/3/2016    Microalbuminuria 1/18/2017    Osteopenia 10/3/2016    Osteoporosis     Pure hypercholesterolemia 1/18/2017    Type II diabetes mellitus ([facility] [address] 10/3/2016    Vitamin D deficiency 1/18/2017     History reviewed. No pertinent surgical history. reports that she has never smoked. She has never used smokeless tobacco. She reports that she does not drink alcohol or use illicit drugs. Family History   Problem Relation Age of Onset    Hypertension Mother     Cancer Mother      pancreas    Cancer Father      lung     Review of Systems   CV:      chest pain, palpitations  PULM:  SOB, wheezing, cough, sputum production    Current Outpatient Meds and Allergies     Current Outpatient Prescriptions on File Prior to Visit   Medication Sig Dispense Refill    valsartan-hydroCHLOROthiazide (DIOVAN-HCT) 320-25 mg per tablet Take 1 Tab by mouth daily.  pravastatin (PRAVACHOL) 40 mg tablet Take 40 mg by mouth nightly.  metoprolol succinate (TOPROL-XL) 25 mg XL tablet Take 1 Tab by mouth daily. 90 Tab 3    metFORMIN (GLUCOPHAGE) 500 mg tablet Take 1 Tab by mouth two (2) times daily (with meals). 180 Tab 3    glucose blood VI test strips (BLOOD GLUCOSE TEST) strip Check BS BID. Dx code: E11.65 200 Strip 3    calcium-cholecalciferol, D3, (CALCIUM 600 + D) tablet Take 1 Tab by mouth two (2) times a day. 180 Tab 3    aspirin delayed-release 81 mg tablet Take  by mouth daily. No current facility-administered medications on file prior to visit.       No Known Allergies    Objective:       Visit Vitals    BP (!) 154/98 (BP 1 Location: Left arm, BP Patient Position: Sitting)  Comment (BP 1 Location): manual    Pulse 62    Temp 96.9 °F (36.1 °C) (Oral)    Resp 20    Ht 4' 11\" (1.499 m)    Wt 128 lb 3.2 oz (58.2 kg)    SpO2 100%    BMI 25.89 kg/m2     General:   Well-nourished, well-groomed, pleasant, alert, in no acute distress  Ears:  External ears WNL, TMs WNL  Eyes:  EOMI, PERRL  Nose:  External nares WNL  Psych:  No pressured speech, no abnormal thought content  Reena manuever: no dizziness with laying back on the right or left, no nystagmus    Lab Results   Component Value Date/Time    Hemoglobin A1c 7.5 (H) 10/03/2018 10:30 AM    Hemoglobin A1c 8.7 (H) 04/21/2017 01:33 PM    Hemoglobin A1c 8.6 (H) 03/01/2017 09:42 AM    Hemoglobin A1c, External 9.2 05/20/2016    Glucose 147 (H) 04/25/2019 04:47 AM    Glucose  03/27/2018 11:24 AM    Microalbumin/Creat ratio (mg/g creat) 114 (H) 10/25/2017 12:00 PM    Microalbumin,urine random 8.70 (H) 10/25/2017 12:00 PM    LDL, calculated 152.2 (H) 10/03/2018 10:30 AM    Creatinine, POC 0.6 04/04/2017 08:29 AM    Creatinine 0.70 04/25/2019 04:47 AM      Assessment/Plan & Orders:         ICD-10-CM ICD-9-CM    1. Cerebrovascular accident (CVA) due to thrombosis of right middle cerebral artery ([facility] [address] I63.311 434.01    2. Essential hypertension I10 401.9    3. Controlled type 2 diabetes mellitus with microalbuminuria, without long-term current use of insulin (HCC) E11.29 250.40 AMB POC HEMOGLOBIN A1C    R80.9 791.0       Continue checking BP at home. Call us if not at goal  Follow up with neurology  Continue labetalol, plavix and higher dose of aspirin    Follow-up and Dispositions    · Return in about 4 weeks (around 10/9/2019) for Hypertension, Diabetes mellitus, Weight management. *Patient verbalized understanding and agreement with the plan. Patient was given an after-visit summary. Con Lat.  Lissette Donaldson MD - Internal Medicine  9/27/2017, 11:20 AM  [facility]  [address] [place], [address]  Phone [phone]  Fax [phone]

## 2019-09-11 NOTE — PATIENT INSTRUCTIONS
Stroke: Care Instructions  Your Care Instructions    You have had a stroke. This means that the blood flow to a part of your brain was blocked for some time, which damages the nerve cells in that part of the brain. The part of your body controlled by that part of your brain may not function properly now. The brain is an amazing organ that can heal itself to some degree. The stroke you had damaged part of your brain. But other parts of your brain may take over in some way for the damaged areas. You have already started this process. Your doctor will talk with you about what you can do to prevent another stroke. High blood pressure, high cholesterol, and diabetes are all risk factors for stroke. If you have any of these conditions, work with your doctor to make sure they are under control. Other risk factors for stroke include being overweight, smoking, and not getting regular exercise. Going home may be hard for you and your family. The more you can try to do for yourself, the better. Remember to take each day one at a time. Follow-up care is a key part of your treatment and safety. Be sure to make and go to all appointments, and call your doctor if you are having problems. It's also a good idea to know your test results and keep a list of the medicines you take. How can you care for yourself at home?    · Enter a stroke rehabilitation (rehab) program, if your doctor recommends it. Physical, speech, and occupational therapies can help you manage bathing, dressing, eating, and other basics of daily living.     · Do not drive until your doctor says it is okay.     · It is normal to feel sad or depressed after a stroke. If these feelings last, talk to your doctor.     · If you are having problems with urine leakage, go to the bathroom at regular times, including when you first wake up and at bedtime.  Also, limit fluids after dinner.     · If you are constipated, drink plenty of fluids, enough so that your urine is light yellow or clear like water. If you have kidney, heart, or liver disease and have to limit fluids, talk with your doctor before you increase the amount of fluids you drink. Set up a regular time for using the toilet. If you continue to have constipation, your doctor may suggest using a bulking agent, such as Metamucil, or a stool softener, laxative, or enema. Medicines    · Take your medicines exactly as prescribed. Call your doctor if you think you are having a problem with your medicine. You may be taking several medicines. ACE (angiotensin-converting enzyme) inhibitors, angiotensin II receptor blockers (ARBs), beta-blockers, diuretics (water pills), and calcium channel blockers control your blood pressure. Statins help lower cholesterol. Your doctor may also prescribe medicines for depression, pain, sleep problems, anxiety, or agitation.     · If your doctor has given you a blood thinner to prevent another stroke, be sure you get instructions about how to take your medicine safely. Blood thinners can cause serious bleeding problems.     · Do not take any over-the-counter medicines or herbal products without talking to your doctor first.     · If you take birth control pills or hormone therapy, talk to your doctor about whether they are right for you.    For family members and caregivers    · Make the home safe. Set up a room so that your loved one does not have to climb stairs. Be sure the bathroom is on the same floor. Move throw rugs and furniture that could cause falls. Make sure that the lighting is good. Put grab bars and seats in tubs and showers.     · Find out what your loved one can do and what he or she needs help with. Try not to do things for your loved one that your loved one can do on his or her own. Help him or her learn and practice new skills.     · Visit and talk with your loved one often. Try doing activities together that you both enjoy, such as playing cards or board games. Keep in touch with your loved one's friends as much as you can. Encourage them to visit.     · Take care of yourself. Do not try to do everything yourself. Ask other family members to help. Eat well, get enough rest, and take time to do things that you enjoy. Keep up with your own doctor visits, and make sure to take your medicines regularly. Get out of the house as much as you can. Join a local support group. Find out if you qualify for home health care visits to help with rehab or for adult day care. When should you call for help? Call 911 anytime you think you may need emergency care. For example, call if:    · You have signs of another stroke. These may include:  ? Sudden numbness, tingling, weakness, or loss of movement in your face, arm, or leg, especially on only one side of your body. ? Sudden vision changes. ? Sudden trouble speaking. ? Sudden confusion or trouble understanding simple statements. ? Sudden problems with walking or balance. ? A sudden, severe headache that is different from past headaches. Call 911 even if these symptoms go away in a few minutes.    Call your doctor now or seek immediate medical care if:    · You have new symptoms that may be related to your stroke, such as falls or trouble swallowing.    Watch closely for changes in your health, and be sure to contact your doctor if you have any problems. Where can you learn more? Go to http://shashank-louie.info/. Enter N044 in the search box to learn more about \"Stroke: Care Instructions. \"  Current as of: September 26, 2018  Content Version: 12.1  © 1163-0286 [employer]. Care instructions adapted under license by [employer] (which disclaims liability or warranty for this information).  If you have questions about a medical condition or this instruction, always ask your healthcare professional. [address] any warranty or liability for your use of this information.

## 2019-09-18 RX ORDER — CLOPIDOGREL BISULFATE 75 MG/1
TABLET ORAL
Qty: 30 TAB | Refills: 0 | Status: SHIPPED | OUTPATIENT
Start: 2019-09-18 | End: 2019-09-27 | Stop reason: SDUPTHER

## 2019-09-18 NOTE — TELEPHONE ENCOUNTER
Patient was taking Plavixs incorrectly. Patient was taking 2 times a day verses once a day. LPN spoke with daughter and informed her that PCP will refill medication. Asked daughter how patient was feeling she said she is doing fine at the moment. Any signs of bleed out please take patient to urgent care or ER. Pt daughter acknowledges understanding and voices no concerns at this time.

## 2019-09-23 ENCOUNTER — PATIENT OUTREACH (OUTPATIENT)
Dept: FAMILY MEDICINE CLINIC | Facility: CLINIC | Age: 84
End: 2019-09-23

## 2019-09-23 NOTE — PROGRESS NOTES
Hospital Discharge Follow-Up      Date/Time:  9/23/2019 12:33 PM    Patient was admitted to [facility] on 8/28/19 and discharged on 8/30/19 for CVA. Attempt to contact patient via telephone on 9/23/19 for  follow up . Unable to reach. Left message on voicemail with office contact information. No Patient medical information left on message. Noted Patient attended appointment with Dr. Lissette Donaldson on 09/11/19.

## 2019-09-27 RX ORDER — CLOPIDOGREL BISULFATE 75 MG/1
TABLET ORAL
Qty: 30 TAB | Refills: 0 | Status: SHIPPED | OUTPATIENT
Start: 2019-09-27 | End: 2020-01-15

## 2019-09-27 NOTE — TELEPHONE ENCOUNTER
LPN spoke with daughter about plavix refill. Patient has been off of medication for two weeks now. Daughter and patient agreed for medication to be sent to [facility]. Daughter was unaware that only one pharmacy is on patient's chart at a time. LPN switch pharmacy for patient medication (plavix).

## 2019-10-04 ENCOUNTER — PATIENT OUTREACH (OUTPATIENT)
Dept: FAMILY MEDICINE CLINIC | Facility: CLINIC | Age: 84
End: 2019-10-04

## 2019-10-04 NOTE — PROGRESS NOTES
Hospital Discharge Follow-Up      Date/Time:  9/23/2019 12:33 PM    Patient was admitted to THE [facility] on 8/28/19 and discharged on 8/30/19 for CVA. Contacted patient for follow up. Reached  Patient's daughter Ms. Michelle Lopez on phone. PHI verified. Introduced self, role and reason for call. Verified 2 patient identifiers (Name and Date of Birth). Patient's daughter states that she is doing well. Patient denied Chest Pain, shortness of breath, fever  Chills and bleeding. Strongly encouraged patient to call PCP office to schedule follow up appointment. Patient's daughter states that they already have the plavix and patient is taking it as directed/ordered. Patient's daughter voiced no concern, needs, and/or assistance at this time. Opportunity to ask questions was provided. Contact information was provided for future reference, assistance, concerns, or further questions. Patient has graduated from the Transitions of Care Coordination  program on 10/4/19  Patient's symptoms are stable at this time. Patient/family has the ability to self-manage. Care management goals have been completed at this time. No further nurse navigator follow up scheduled. Noted no hospitalization  admission post 30 days from discharge date 8/30/19. This episode is closed. Post Hospitalization Encounter Resolved.

## 2019-10-16 ENCOUNTER — OFFICE VISIT (OUTPATIENT)
Dept: FAMILY MEDICINE CLINIC | Facility: CLINIC | Age: 84
End: 2019-10-16

## 2019-10-16 VITALS
BODY MASS INDEX: 27.38 KG/M2 | RESPIRATION RATE: 16 BRPM | DIASTOLIC BLOOD PRESSURE: 80 MMHG | WEIGHT: 135.8 LBS | OXYGEN SATURATION: 98 % | HEIGHT: 59 IN | SYSTOLIC BLOOD PRESSURE: 120 MMHG | TEMPERATURE: 97.1 F | HEART RATE: 58 BPM

## 2019-10-16 DIAGNOSIS — E11.29 CONTROLLED TYPE 2 DIABETES MELLITUS WITH MICROALBUMINURIA, WITHOUT LONG-TERM CURRENT USE OF INSULIN (HCC): ICD-10-CM

## 2019-10-16 DIAGNOSIS — R80.9 CONTROLLED TYPE 2 DIABETES MELLITUS WITH MICROALBUMINURIA, WITHOUT LONG-TERM CURRENT USE OF INSULIN (HCC): ICD-10-CM

## 2019-10-16 DIAGNOSIS — E66.3 OVERWEIGHT (BMI 25.0-29.9): ICD-10-CM

## 2019-10-16 DIAGNOSIS — Z23 ENCOUNTER FOR IMMUNIZATION: ICD-10-CM

## 2019-10-16 DIAGNOSIS — I10 ESSENTIAL HYPERTENSION: Primary | ICD-10-CM

## 2019-10-16 NOTE — PROGRESS NOTES
Ly Michelle is a 80 y.o.  female presents today for office visit for follow up. Pt would also like to discuss HTN. Pt is not fasting. Pt is in Room # 2      1. Have you been to the ER, urgent care clinic since your last visit? Hospitalized since your last visit? No    2. Have you seen or consulted any other health care providers outside of the [address] [place] [address] since your last visit? Include any pap smears or colon screening. No    Upcoming Appts  none    Health Maintenance reviewed       VORB: No orders of the defined types were placed in this encounter. Min Meyer LPN         Ly Michelle is a 80 y.o. female who presents for routine immunizations. She denies any symptoms , reactions or allergies that would exclude them from being immunized today. Risks and adverse reactions were discussed and the VIS was given to them. All questions were addressed. She was observed for 10 min post injection. There were no reactions observed.     Krystle Prieto LPN

## 2019-10-21 NOTE — PATIENT INSTRUCTIONS

## 2019-10-21 NOTE — PROGRESS NOTES
Internal Medicine Progress Note    Today's Date:  2017   Patient:  Amy Ryan  Patient :  1932    Subjective:     Chief Complaint   Patient presents with    Hypertension    Diabetes     BS: did not today    Weight Management    Immunization/Injection     flu     Hypertension   This is a chronic problem. BP is at goal.  Pt is on losartan, norvasc and labetalol. Toprol was stopped during her recent hospital admission.      Diabetes mellitus  This is a chronic problem. This is controlled. Pt takes metformin. Pt is on aspirin and statin.  +microalbuminuria on an ARB. Claudette Shalom is cost prohibitive. Lab Results   Component Value Date/Time    Hemoglobin A1c 7.5 (H) 10/03/2018 10:30 AM    Hemoglobin A1c (POC) 7.2 2019 02:24 PM    Hemoglobin A1c, External 9.2 2016     Overweight  This is a chronic problem. This is not at goal. Pt exercises regularly. Pt tries to eat a healthy diet. Information given on ketogenic/IF diet during a previous visit but pt is not doing this. 3 most recent PHQ Screens 2019   PHQ Not Done -   Little interest or pleasure in doing things Not at all   Feeling down, depressed, irritable, or hopeless Not at all   Total Score PHQ 2 0     Past Medical History:   Diagnosis Date    Advance directive discussed with patient 10/12/2016    Cigarette nicotine dependence in remission 10/12/2016    Dizziness 3/16/2017    Essential hypertension 10/3/2016    Microalbuminuria 2017    Osteopenia 10/3/2016    Osteoporosis     Pure hypercholesterolemia 2017    Type II diabetes mellitus ([facility] [address] 10/3/2016    Vitamin D deficiency 2017     History reviewed. No pertinent surgical history. reports that she has never smoked. She has never used smokeless tobacco. She reports that she does not drink alcohol or use illicit drugs.     Family History   Problem Relation Age of Onset    Hypertension Mother     Cancer Mother      pancreas    Cancer Father      lung Review of Systems   CV:      chest pain, palpitations  PULM:  SOB, wheezing, cough, sputum production    Current Outpatient Meds and Allergies     Current Outpatient Prescriptions on File Prior to Visit   Medication Sig Dispense Refill    valsartan-hydroCHLOROthiazide (DIOVAN-HCT) 320-25 mg per tablet Take 1 Tab by mouth daily.  pravastatin (PRAVACHOL) 40 mg tablet Take 40 mg by mouth nightly.  metoprolol succinate (TOPROL-XL) 25 mg XL tablet Take 1 Tab by mouth daily. 90 Tab 3    metFORMIN (GLUCOPHAGE) 500 mg tablet Take 1 Tab by mouth two (2) times daily (with meals). 180 Tab 3    glucose blood VI test strips (BLOOD GLUCOSE TEST) strip Check BS BID. Dx code: E11.65 200 Strip 3    calcium-cholecalciferol, D3, (CALCIUM 600 + D) tablet Take 1 Tab by mouth two (2) times a day. 180 Tab 3    aspirin delayed-release 81 mg tablet Take  by mouth daily. No current facility-administered medications on file prior to visit.       No Known Allergies    Objective:       Visit Vitals    BP (!) 154/98 (BP 1 Location: Left arm, BP Patient Position: Sitting)  Comment (BP 1 Location): manual    Pulse 62    Temp 96.9 °F (36.1 °C) (Oral)    Resp 20    Ht 4' 11\" (1.499 m)    Wt 128 lb 3.2 oz (58.2 kg)    SpO2 100%    BMI 25.89 kg/m2     General:   Well-nourished, well-groomed, pleasant, alert, in no acute distress  Ears:  External ears WNL, TMs WNL  Eyes:  EOMI, PERRL  Nose:  External nares WNL  Psych:  No pressured speech, no abnormal thought content  Diabetic foot exam: monofilament exam normal, no open lesions or or erythema    Lab Results   Component Value Date/Time    Hemoglobin A1c 7.5 (H) 10/03/2018 10:30 AM    Hemoglobin A1c 8.7 (H) 04/21/2017 01:33 PM    Hemoglobin A1c 8.6 (H) 03/01/2017 09:42 AM    Hemoglobin A1c, External 9.2 05/20/2016    Glucose 147 (H) 04/25/2019 04:47 AM    Glucose  03/27/2018 11:24 AM    Microalbumin/Creat ratio (mg/g creat) 114 (H) 10/25/2017 12:00 PM Microalbumin,urine random 8.70 (H) 10/25/2017 12:00 PM    LDL, calculated 152.2 (H) 10/03/2018 10:30 AM    Creatinine, POC 0.6 04/04/2017 08:29 AM    Creatinine 0.70 04/25/2019 04:47 AM      Assessment/Plan & Orders:         ICD-10-CM ICD-9-CM    1. Essential hypertension I10 401.9    2. Controlled type 2 diabetes mellitus with microalbuminuria, without long-term current use of insulin (HCC) E11.29 250.40  DIABETES FOOT EXAM    R80.9 791.0    3. Overweight (BMI 25.0-29. 9) E66.3 278.02    4. Encounter for immunization Z23 V03.89 pneumococcal 13 oren conj dip (PREVNAR-13) 0.5 mL syrg injection      INFLUENZA VACCINE INACTIVATED (IIV), SUBUNIT, ADJUVANTED, IM      Continue checking BP at home. Call us if not at goal  Follow up with neurology    Follow-up and Dispositions    · Return in about 3 months (around 1/16/2020) for Medicare wellness, Hypertension, Diabetes mellitus. *Patient verbalized understanding and agreement with the plan. Patient was given an after-visit summary. Cynthia Myles.  Rui1 JOSE Donaldson MD - Internal Medicine  9/27/2017, 11:20 AM  [facility]  [address] [facility] [address], [address]  Phone [phone]  Fax [phone]

## 2019-11-18 ENCOUNTER — TELEPHONE (OUTPATIENT)
Dept: FAMILY MEDICINE CLINIC | Facility: CLINIC | Age: 84
End: 2019-11-18

## 2019-11-18 RX ORDER — BLOOD-GLUCOSE METER
EACH MISCELLANEOUS
Qty: 1 EACH | Refills: 0 | Status: SHIPPED | OUTPATIENT
Start: 2019-11-18 | End: 2021-03-01 | Stop reason: SDUPTHER

## 2019-11-21 NOTE — TELEPHONE ENCOUNTER
Patient came into the office and stated that she needs her test strips for One Touch Ultra (Blue). It was not on her med list but she brought in the box for One Touch, not the Accu-Check. Please order these test strips for her and send to [address].

## 2020-01-15 ENCOUNTER — OFFICE VISIT (OUTPATIENT)
Dept: FAMILY MEDICINE CLINIC | Facility: CLINIC | Age: 85
End: 2020-01-15

## 2020-01-15 VITALS
BODY MASS INDEX: 27.34 KG/M2 | WEIGHT: 135.6 LBS | HEIGHT: 59 IN | RESPIRATION RATE: 17 BRPM | HEART RATE: 52 BPM | TEMPERATURE: 95.3 F | SYSTOLIC BLOOD PRESSURE: 140 MMHG | DIASTOLIC BLOOD PRESSURE: 70 MMHG | OXYGEN SATURATION: 98 %

## 2020-01-15 DIAGNOSIS — I10 ESSENTIAL HYPERTENSION: ICD-10-CM

## 2020-01-15 DIAGNOSIS — R80.9 CONTROLLED TYPE 2 DIABETES MELLITUS WITH MICROALBUMINURIA, WITHOUT LONG-TERM CURRENT USE OF INSULIN (HCC): ICD-10-CM

## 2020-01-15 DIAGNOSIS — Z00.00 MEDICARE ANNUAL WELLNESS VISIT, SUBSEQUENT: Primary | ICD-10-CM

## 2020-01-15 DIAGNOSIS — E66.3 OVERWEIGHT (BMI 25.0-29.9): ICD-10-CM

## 2020-01-15 DIAGNOSIS — E55.9 VITAMIN D DEFICIENCY: ICD-10-CM

## 2020-01-15 DIAGNOSIS — Z13.31 SCREENING FOR DEPRESSION: ICD-10-CM

## 2020-01-15 DIAGNOSIS — E11.29 CONTROLLED TYPE 2 DIABETES MELLITUS WITH MICROALBUMINURIA, WITHOUT LONG-TERM CURRENT USE OF INSULIN (HCC): ICD-10-CM

## 2020-01-15 NOTE — PROGRESS NOTES
Internal Medicine Progress Note    Today's Date:  2017   Patient:  Goldie Casanova  Patient :  1932    Subjective:     Chief Complaint   Patient presents with    Annual Wellness Visit    Diabetes     BS:126    Hypertension    Weight Management     Hypertension   This is a chronic problem. BP is at goal.  Pt is on losartan, norvasc and labetalol. Toprol was stopped during her recent hospital admission.      Diabetes mellitus  This is a chronic problem. This is controlled. Pt takes metformin. Pt is on aspirin and statin.  +microalbuminuria on an ARB. Ileene Berenice is cost prohibitive. Lab Results   Component Value Date/Time    Hemoglobin A1c 7.5 (H) 10/03/2018 10:30 AM    Hemoglobin A1c (POC) 7.2 2019 02:24 PM    Hemoglobin A1c, External 9.2 2016     Overweight  This is a chronic problem. This is not at goal. Pt exercises regularly. Pt tries to eat a healthy diet. Information given on ketogenic/IF diet during a previous visit but pt is not doing this. 3 most recent PHQ Screens 1/15/2020   PHQ Not Done -   Little interest or pleasure in doing things Not at all   Feeling down, depressed, irritable, or hopeless Not at all   Total Score PHQ 2 0     Past Medical History:   Diagnosis Date    Advance directive discussed with patient 10/12/2016    Cigarette nicotine dependence in remission 10/12/2016    Dizziness 3/16/2017    Essential hypertension 10/3/2016    Microalbuminuria 2017    Osteopenia 10/3/2016    Osteoporosis     Pure hypercholesterolemia 2017    Type II diabetes mellitus ([facility] [address] 10/3/2016    Vitamin D deficiency 2017     History reviewed. No pertinent surgical history. reports that she has never smoked. She has never used smokeless tobacco. She reports that she does not drink alcohol or use illicit drugs.     Family History   Problem Relation Age of Onset    Hypertension Mother     Cancer Mother      pancreas    Cancer Father      lung     Review of Systems   CV:      chest pain, palpitations  PULM:  SOB, wheezing, cough, sputum production    Current Outpatient Meds and Allergies     Current Outpatient Prescriptions on File Prior to Visit   Medication Sig Dispense Refill    valsartan-hydroCHLOROthiazide (DIOVAN-HCT) 320-25 mg per tablet Take 1 Tab by mouth daily.  pravastatin (PRAVACHOL) 40 mg tablet Take 40 mg by mouth nightly.  metoprolol succinate (TOPROL-XL) 25 mg XL tablet Take 1 Tab by mouth daily. 90 Tab 3    metFORMIN (GLUCOPHAGE) 500 mg tablet Take 1 Tab by mouth two (2) times daily (with meals). 180 Tab 3    glucose blood VI test strips (BLOOD GLUCOSE TEST) strip Check BS BID. Dx code: E11.65 200 Strip 3    calcium-cholecalciferol, D3, (CALCIUM 600 + D) tablet Take 1 Tab by mouth two (2) times a day. 180 Tab 3    aspirin delayed-release 81 mg tablet Take  by mouth daily. No current facility-administered medications on file prior to visit. No Known Allergies    Objective:       Visit Vitals  /70 (BP 1 Location: Left arm, BP Patient Position: Sitting)   Pulse (!) 52   Temp 95.3 °F (35.2 °C) (Oral)   Resp 17   Ht 4' 11\" (1.499 m)   Wt 135 lb 9.6 oz (61.5 kg)   SpO2 98%   BMI 27.39 kg/m²     General:   Well-nourished, well-groomed, pleasant, alert, in no acute distress  Ears:  External ears WNL, TMs WNL  Eyes:  EOMI, PERRL  Nose:  External nares WNL  Psych:  No pressured speech, no abnormal thought content  Diabetic foot exam: monofilament exam normal, no open lesions or or erythema    Lab Results   Component Value Date/Time    GFR est AA >60 04/25/2019 04:47 AM    GFR est non-AA >60 04/25/2019 04:47 AM    Creatinine, POC 0.6 04/04/2017 08:29 AM    Creatinine 0.70 04/25/2019 04:47 AM    BUN 14 04/25/2019 04:47 AM    Sodium 140 04/25/2019 04:47 AM    Potassium 3.5 04/25/2019 04:47 AM    Chloride 107 04/25/2019 04:47 AM    CO2 25 04/25/2019 04:47 AM     Assessment/Plan & Orders:         ICD-10-CM ICD-9-CM    1.  Medicare annual wellness visit, subsequent Z00.00 V70.0    2. Essential hypertension I10 401.9 CBC WITH AUTOMATED DIFF   3. Controlled type 2 diabetes mellitus with microalbuminuria, without long-term current use of insulin (HCC) E11.29 250.40 LIPID PANEL    Z97.6 494.2 METABOLIC PANEL, COMPREHENSIVE      HEMOGLOBIN A1C WITH EAG   4. Overweight (BMI 25.0-29. 9) E66.3 278.02    5. Vitamin D deficiency E55.9 268.9 VITAMIN D, 25 HYDROXY   6. Screening for depression Z13.31 V79.0 [place] PATIENT SCREENED FOR DEPRESSION      Continue checking BP at home. Call us if not at goal  Follow up with neurology    Follow-up and Dispositions    · Return for Go over labs/imaging, Diabetes mellitus, Hypertension, Weight management. *Patient verbalized understanding and agreement with the plan. Patient was given an after-visit summary. Camron Murphy.  [id number]onaldson MD - Internal Medicine  9/27/2017, 11:20 AM  [facility]  [address] [facility] [address], [address]  Phone [phone]  Fax [phone]

## 2020-01-15 NOTE — PROGRESS NOTES
Marilee Burroughs is a 80 y.o.  female presents today for office visit for follow up. Pt would also like to discuss DM. Pt is not fasting. Pt is in Room # 3      1. Have you been to the ER, urgent care clinic since your last visit? Hospitalized since your last visit? No    2. Have you seen or consulted any other health care providers outside of the [address] [place] [address] since your last visit? Include any pap smears or colon screening.  No    Upcoming Appts  none    Health Maintenance reviewed      VORB:   Orders Placed This Encounter    CBC WITH AUTOMATED DIFF    LIPID PANEL    METABOLIC PANEL, COMPREHENSIVE    HEMOGLOBIN A1C WITH EAG    VITAMIN D, 25 HYDROXY   Felix Quinteros LPN        Visual Acuity Screening    Right eye Left eye Both eyes   Without correction: 20/40 20/50 20/40   With correction:

## 2020-01-15 NOTE — PROGRESS NOTES
Veronica Bee is a 80 y.o. female and presents for annual Medicare Wellness Visit. Problem List: Reviewed with patient and discussed risk factors. Patient Active Problem List   Diagnosis Code    Osteopenia M85.80    Essential hypertension I10    Controlled type 2 diabetes mellitus with microalbuminuria, without long-term current use of insulin (HCC) E11.29, R80.9    Microalbuminuria R80.9    Nonrheumatic aortic valve stenosis I35.0    White coat syndrome with hypertension I10    Overweight (BMI 25.0-29. 9) E66.3    Cerebrovascular accident (CVA) due to thrombosis of right middle cerebral artery (HCC) I63.311    Hemispheric carotid artery syndrome G45.1     Current medical providers:  Patient Care Team:  Charlotte Monroy MD as PCP - General (Internal Medicine)  Charlotte Monroy MD as PCP - [facility]  Karlie Royal OD (Ophthalmology)  Abby Wells OD (Ophthalmology)    PSH: Reviewed with patient  No past surgical history on file. SH: Reviewed with patient  Social History     Tobacco Use    Smoking status: Never Smoker    Smokeless tobacco: Never Used   Substance Use Topics    Alcohol use: No    Drug use: No     FH: Reviewed with patient  Family History   Problem Relation Age of Onset    Hypertension Mother     Cancer Mother         pancreas    Cancer Father         lung     Medications/Allergies: Reviewed with patient  Current Outpatient Medications on File Prior to Visit   Medication Sig Dispense Refill    glucose blood VI test strips (ACCU-CHEK FAROOQ PLUS TEST STRP) strip Check BS daily prn 100 Strip 3    Blood-Glucose Meter (ACCU-CHEK FAROOQ PLUS METER) misc Check BS daily prn 1 Each 0    labetalol (NORMODYNE) 100 mg tablet TAKE 1 TABLET BY MOUTH EVERY 12 HOURS  3    aspirin (ASPIRIN) 325 mg tablet TAKE 1 TABLET EVERY DAY  3    losartan (COZAAR) 100 mg tablet Take 1 Tab by mouth daily.  90 Tab 0    metFORMIN ER (GLUCOPHAGE XR) 750 mg tablet Take 1 Tab by mouth daily. 90 Tab 3    amLODIPine (NORVASC) 10 mg tablet TAKE 1 TABLET BY MOUTH ONCE A DAY 90 Tab 3    atorvastatin (LIPITOR) 80 mg tablet Take 1 Tab by mouth nightly. 90 Tab 3    calcium-cholecalciferol, D3, (CALCIUM 600 + D) tablet Take 1 Tab by mouth two (2) times a day. 180 Tab 3    clopidogrel (PLAVIX) 75 mg tab TAKE 1 TABLET BY MOUTH EVERY DAY 30 Tab 0     No current facility-administered medications on file prior to visit. No Known Allergies    Objective:  Visit Vitals  /70 (BP 1 Location: Left arm, BP Patient Position: Sitting)   Pulse (!) 52   Temp 95.3 °F (35.2 °C) (Oral)   Resp 17   Ht 4' 11\" (1.499 m)   Wt 135 lb 9.6 oz (61.5 kg)   SpO2 98%   BMI 27.39 kg/m²    Body mass index is 27.39 kg/m². Assessment of cognitive impairment: Alert and oriented x 3  Depression Screen:   3 most recent PHQ Screens 1/15/2020   PHQ Not Done -   Little interest or pleasure in doing things Not at all   Feeling down, depressed, irritable, or hopeless Not at all   Total Score PHQ 2 0     Fall Risk Assessment:    Fall Risk Assessment, last 12 mths 1/15/2020   Able to walk? -   Fall in past 12 months? No   Fall with injury? -   Number of falls in past 12 months -   Fall Risk Score -     Functional Ability:   Does the patient exhibit a steady gait? yes   How long did it take the patient to get up and walk from a sitting position? 2 seconds   Is the patient self reliant?  (ie can do own laundry, meals, household chores)  yes   Does the patient handle his/her own medications? yes   Does the patient handle his/her own money? yes   Is the patients home safe (ie good lighting, handrails on stairs and bath, etc.)? yes   Did you notice or did patient express any hearing difficulties? no   Did you notice of did patient express any vision difficulties?   no   Were distance and reading eye charts used?   yes     Advance Care Planning:   Patient was offered the opportunity to discuss advance care planning:  yes Does patient have an Advance Directive: yes   If no, did you provide information on Caring Connections?  no     Plan:    Orders Placed This Encounter    CBC WITH AUTOMATED DIFF    LIPID PANEL    METABOLIC PANEL, COMPREHENSIVE    HEMOGLOBIN A1C WITH EAG    VITAMIN D, 25 HYDROXY    [place] PATIENT SCREENED FOR DEPRESSION     Health Maintenance   Topic Date Due    MEDICARE YEARLY EXAM  11/21/2019    Shingrix Vaccine Age 50> (1 of 2) 04/23/2020 (Originally 12/14/1982)    HEMOGLOBIN A1C Q6M  03/11/2020    LIPID PANEL Q1  08/29/2020    FOOT EXAM Q1  10/21/2020    GLAUCOMA SCREENING Q2Y  01/23/2021    EYE EXAM RETINAL OR DILATED  01/23/2021    DTaP/Tdap/Td series (3 - Td) 11/16/2028    Bone Densitometry (Dexa) Screening  Completed    Influenza Age 5 to Adult  Completed    Pneumococcal 65+ years  Completed     *Patient verbalized understanding and agreement with the plan. A copy of the After Visit Summary with personalized health plan was given to the patient today. Follow-up and Dispositions    · Return for Go over labs/imaging, Diabetes mellitus, Hypertension, Weight management. Senait Montano.  [address], MD - Internal Medicine  1/15/2020, 11:17 AM  [facility]  [address] [place], [address]  Phone [phone]  Fax [phone]

## 2020-01-15 NOTE — PATIENT INSTRUCTIONS
Schedule of Personalized Health Plan (Provide Copy to Patient) The best way to stay healthy is to live a healthy lifestyle. A healthy lifestyle includes regular exercise, eating a well-balanced diet, keeping a healthy weight and not smoking. Regular physical exams and screening tests are another important way to take care of yourself. Preventive exams provided by health care providers can find health problems early when treatment works best and can keep you from getting certain diseases or illnesses. Preventive services include exams, lab tests, screenings, shots, monitoring and information to help you take care of your own health. All people over 65 should have a pneumonia shot. Pneumonia shots are usually only needed once in a lifetime unless your doctor decides differently. All people over 65 should have a yearly flu shot. People over 65 are at medium to high risk for Hepatitis B. Three shots are needed for complete protection. In addition to your physical exam, some screening tests are recommended: 
 
 
Screening for Breast Cancer is recommended yearly with a mammogram. 
 
Screening for Cervical Cancer is recommended every two years (annually for certain risk factors, such as previous history of STD or abnormal PAP in past 7 years), with a Pelvic Exam with PAP 
 
 Here is a list of your current Health Maintenance items with a due date: 
Health Maintenance Topic Date Due  MEDICARE YEARLY EXAM  11/21/2019  Shingrix Vaccine Age 50> (1 of 2) 04/23/2020 (Originally 12/14/1982)  HEMOGLOBIN A1C Q6M  03/11/2020  LIPID PANEL Q1  08/29/2020  
 FOOT EXAM Q1  10/21/2020  GLAUCOMA SCREENING Q2Y  01/23/2021  
 EYE EXAM RETINAL OR DILATED  01/23/2021  
 DTaP/Tdap/Td series (3 - Td) 11/16/2028  Bone Densitometry (Dexa) Screening  Completed  Influenza Age 5 to Adult  Completed  Pneumococcal 65+ years  Completed

## 2020-02-06 DIAGNOSIS — R80.9 CONTROLLED TYPE 2 DIABETES MELLITUS WITH MICROALBUMINURIA, WITHOUT LONG-TERM CURRENT USE OF INSULIN (HCC): ICD-10-CM

## 2020-02-06 DIAGNOSIS — I10 ESSENTIAL HYPERTENSION: ICD-10-CM

## 2020-02-06 DIAGNOSIS — E11.29 CONTROLLED TYPE 2 DIABETES MELLITUS WITH MICROALBUMINURIA, WITHOUT LONG-TERM CURRENT USE OF INSULIN (HCC): ICD-10-CM

## 2020-02-27 ENCOUNTER — TELEPHONE (OUTPATIENT)
Dept: FAMILY MEDICINE CLINIC | Facility: CLINIC | Age: 85
End: 2020-02-27

## 2020-02-27 NOTE — TELEPHONE ENCOUNTER
Patient came in today wanting a refill of plavix LPN informed patient that she needs to see her neurologist for refills.  Jd Baker MD from [facility]

## 2020-08-25 ENCOUNTER — VIRTUAL VISIT (OUTPATIENT)
Dept: FAMILY MEDICINE CLINIC | Facility: CLINIC | Age: 85
End: 2020-08-25

## 2020-08-27 LAB
25(OH)D3+25(OH)D2 SERPL-MCNC: 16.4 NG/ML (ref 30–100)
ALBUMIN SERPL-MCNC: 4.5 G/DL (ref 3.6–4.6)
ALBUMIN/GLOB SERPL: 1.9 {RATIO} (ref 1.2–2.2)
ALP SERPL-CCNC: 88 IU/L (ref 39–117)
ALT SERPL-CCNC: 13 IU/L (ref 0–32)
AST SERPL-CCNC: 15 IU/L (ref 0–40)
BASOPHILS # BLD AUTO: 0 X10E3/UL (ref 0–0.2)
BASOPHILS NFR BLD AUTO: 0 %
BILIRUB SERPL-MCNC: 0.2 MG/DL (ref 0–1.2)
BUN SERPL-MCNC: 16 MG/DL (ref 8–27)
BUN/CREAT SERPL: 23 (ref 12–28)
CALCIUM SERPL-MCNC: 9 MG/DL (ref 8.7–10.3)
CHLORIDE SERPL-SCNC: 100 MMOL/L (ref 96–106)
CHOLEST SERPL-MCNC: 291 MG/DL (ref 100–199)
CO2 SERPL-SCNC: 21 MMOL/L (ref 20–29)
CREAT SERPL-MCNC: 0.7 MG/DL (ref 0.57–1)
EOSINOPHIL # BLD AUTO: 0.1 X10E3/UL (ref 0–0.4)
EOSINOPHIL NFR BLD AUTO: 2 %
ERYTHROCYTE [DISTWIDTH] IN BLOOD BY AUTOMATED COUNT: 14 % (ref 11.7–15.4)
EST. AVERAGE GLUCOSE BLD GHB EST-MCNC: 174 MG/DL
GLOBULIN SER CALC-MCNC: 2.4 G/DL (ref 1.5–4.5)
GLUCOSE SERPL-MCNC: 130 MG/DL (ref 65–99)
HBA1C MFR BLD: 7.7 % (ref 4.8–5.6)
HCT VFR BLD AUTO: 36.5 % (ref 34–46.6)
HDLC SERPL-MCNC: 62 MG/DL
HGB BLD-MCNC: 12.4 G/DL (ref 11.1–15.9)
IMM GRANULOCYTES # BLD AUTO: 0 X10E3/UL (ref 0–0.1)
IMM GRANULOCYTES NFR BLD AUTO: 0 %
INTERPRETATION, 910389: NORMAL
LDLC SERPL CALC-MCNC: 207 MG/DL (ref 0–99)
LYMPHOCYTES # BLD AUTO: 2.5 X10E3/UL (ref 0.7–3.1)
LYMPHOCYTES NFR BLD AUTO: 42 %
Lab: NORMAL
MCH RBC QN AUTO: 31.2 PG (ref 26.6–33)
MCHC RBC AUTO-ENTMCNC: 34 G/DL (ref 31.5–35.7)
MCV RBC AUTO: 92 FL (ref 79–97)
MONOCYTES # BLD AUTO: 0.6 X10E3/UL (ref 0.1–0.9)
MONOCYTES NFR BLD AUTO: 9 %
NEUTROPHILS # BLD AUTO: 2.8 X10E3/UL (ref 1.4–7)
NEUTROPHILS NFR BLD AUTO: 47 %
PLATELET # BLD AUTO: 268 X10E3/UL (ref 150–450)
POTASSIUM SERPL-SCNC: 4.2 MMOL/L (ref 3.5–5.2)
PROT SERPL-MCNC: 6.9 G/DL (ref 6–8.5)
RBC # BLD AUTO: 3.97 X10E6/UL (ref 3.77–5.28)
SODIUM SERPL-SCNC: 141 MMOL/L (ref 134–144)
TRIGL SERPL-MCNC: 112 MG/DL (ref 0–149)
VLDLC SERPL CALC-MCNC: 22 MG/DL (ref 5–40)
WBC # BLD AUTO: 6 X10E3/UL (ref 3.4–10.8)

## 2020-09-01 ENCOUNTER — VIRTUAL VISIT (OUTPATIENT)
Dept: FAMILY MEDICINE CLINIC | Facility: CLINIC | Age: 85
End: 2020-09-01

## 2020-09-01 VITALS
DIASTOLIC BLOOD PRESSURE: 70 MMHG | HEIGHT: 59 IN | BODY MASS INDEX: 27.21 KG/M2 | WEIGHT: 135 LBS | SYSTOLIC BLOOD PRESSURE: 149 MMHG

## 2020-09-01 DIAGNOSIS — E78.00 PURE HYPERCHOLESTEROLEMIA: ICD-10-CM

## 2020-09-01 DIAGNOSIS — E11.29 CONTROLLED TYPE 2 DIABETES MELLITUS WITH MICROALBUMINURIA, WITHOUT LONG-TERM CURRENT USE OF INSULIN (HCC): ICD-10-CM

## 2020-09-01 DIAGNOSIS — R80.9 CONTROLLED TYPE 2 DIABETES MELLITUS WITH MICROALBUMINURIA, WITHOUT LONG-TERM CURRENT USE OF INSULIN (HCC): ICD-10-CM

## 2020-09-01 DIAGNOSIS — I10 ESSENTIAL HYPERTENSION, BENIGN: Primary | ICD-10-CM

## 2020-09-01 DIAGNOSIS — E66.3 OVERWEIGHT: ICD-10-CM

## 2020-09-01 DIAGNOSIS — Z13.31 SCREENING FOR DEPRESSION: ICD-10-CM

## 2020-09-01 RX ORDER — METFORMIN HYDROCHLORIDE 750 MG/1
TABLET, EXTENDED RELEASE ORAL
Qty: 90 TAB | Refills: 3 | Status: SHIPPED | OUTPATIENT
Start: 2020-09-01 | End: 2021-12-07 | Stop reason: SDUPTHER

## 2020-09-01 RX ORDER — AMLODIPINE BESYLATE 10 MG/1
10 TABLET ORAL DAILY
Qty: 90 TAB | Refills: 3 | Status: SHIPPED | OUTPATIENT
Start: 2020-09-01 | End: 2021-12-07 | Stop reason: SDUPTHER

## 2020-09-01 RX ORDER — ATORVASTATIN CALCIUM 80 MG/1
TABLET, FILM COATED ORAL
Qty: 90 TAB | Refills: 3 | Status: SHIPPED | OUTPATIENT
Start: 2020-09-01 | End: 2021-12-07 | Stop reason: SDUPTHER

## 2020-09-01 RX ORDER — LOSARTAN POTASSIUM 100 MG/1
TABLET ORAL
Qty: 90 TAB | Refills: 3 | Status: SHIPPED | OUTPATIENT
Start: 2020-09-01 | End: 2021-12-07 | Stop reason: SDUPTHER

## 2020-09-01 NOTE — PROGRESS NOTES
Tomasa Vallejo is a 80 y.o.  female presents today for office visit for follow up. Pt would also like to discuss HTN. 1. Have you been to the ER, urgent care clinic since your last visit? Hospitalized since your last visit? No    2. Have you seen or consulted any other health care providers outside of the [address] [place] [address] since your last visit? Include any pap smears or colon screening. No    Upcoming Appts  none    Health Maintenance reviewed    VORB: No orders of the defined types were placed in this encounter.   Radha Sanz LPN

## 2020-09-01 NOTE — PROGRESS NOTES
Nayla aWlsh is a 80 y.o. female, evaluated via audio-only technology on 9/1/2020 for     Assessment & Plan:   Diagnoses and all orders for this visit:    1. Essential hypertension, benign  -     losartan (COZAAR) 100 mg tablet; TAKE 1 TABLET EVERY DAY  -     amLODIPine (NORVASC) 10 mg tablet; Take 1 Tab by mouth daily. 2. Controlled type 2 diabetes mellitus with microalbuminuria, without long-term current use of insulin (HCC)  -     metFORMIN ER (GLUCOPHAGE XR) 750 mg tablet; TAKE 1 TABLET EVERY DAY  -     atorvastatin (LIPITOR) 80 mg tablet; TAKE 1 TABLET EVERY NIGHT    3. Pure hypercholesterolemia  -     atorvastatin (LIPITOR) 80 mg tablet; TAKE 1 TABLET EVERY NIGHT    4. Overweight    5. Screening for depression  -     [place] DEPRESSION SCREEN ANNUAL      Follow-up and Dispositions    · Return in about 4 months (around 1/15/2021) for Medicare wellness, Diabetes mellitus, Hypertension, Hyperlipidemia, Weight management. Subjective:     Hypertension   This is a chronic problem. BP is at goal.  Pt is on losartan, norvasc and labetalol. Toprol was stopped during her recent hospital admission.      Diabetes mellitus  This is a chronic problem. This is controlled. Pt takes metformin. Pt is on aspirin and statin.  +microalbuminuria on an ARB. Januvia is cost prohibitive.     OverweightHyperlipidemia  This is a chronic problem. This is not at goal. Pt exercises regularly. Pt tries to eat a healthy diet. Information given on ketogenic/IF diet during a previous visit but pt is not doing this. Pt does not take lipitor regularly. 3 most recent PHQ Screens 9/1/2020   PHQ Not Done -   Little interest or pleasure in doing things Not at all   Feeling down, depressed, irritable, or hopeless Not at all   Total Score PHQ 2 0     Prior to Admission medications    Medication Sig Start Date End Date Taking?  Authorizing Provider   metFORMIN ER (GLUCOPHAGE XR) 750 mg tablet TAKE 1 TABLET EVERY DAY 9/1/20  Yes Boom Khan MD JOSE   losartan (COZAAR) 100 mg tablet TAKE 1 TABLET EVERY DAY 9/1/20  Yes Bharath Castellanos MD   amLODIPine (NORVASC) 10 mg tablet Take 1 Tab by mouth daily. 9/1/20  Yes Bharath Castellanos MD   atorvastatin (LIPITOR) 80 mg tablet TAKE 1 TABLET EVERY NIGHT 9/1/20  Yes Bharath Castellanos MD   glucose blood VI test strips (ACCU-CHEK FAROOQ PLUS TEST STRP) strip Check BS daily prn 11/21/19  Yes Bharath Castellanos MD   Blood-Glucose Meter (ACCU-CHEK FAROOQ PLUS METER) misc Check BS daily prn 11/18/19  Yes Bharath Castellanos MD   labetalol (NORMODYNE) 100 mg tablet TAKE 1 TABLET BY MOUTH EVERY 12 HOURS 8/30/19  Yes Provider, Historical   aspirin (ASPIRIN) 325 mg tablet TAKE 1 TABLET EVERY DAY 8/30/19  Yes Provider, Historical   calcium-cholecalciferol, D3, (CALCIUM 600 + D) tablet Take 1 Tab by mouth two (2) times a day. 2/24/19  Yes Bharath Castellanos MD   metFORMIN ER (GLUCOPHAGE XR) 750 mg tablet TAKE 1 TABLET EVERY DAY 8/20/20 9/1/20  [place], [facility] B, PA-C   losartan (COZAAR) 100 mg tablet TAKE 1 TABLET EVERY DAY 8/20/20 9/1/20  [place], [facility], PA-C   amLODIPine (NORVASC) 10 mg tablet TAKE 1 TABLET EVERY DAY 8/20/20 9/1/20  [place], [facility], PA-C   atorvastatin (LIPITOR) 80 mg tablet TAKE 1 TABLET EVERY NIGHT 3/29/20 9/1/20  Bharath Castellanos MD     Patient Active Problem List   Diagnosis Code    Osteopenia M85.80    Essential hypertension I10    Controlled type 2 diabetes mellitus with microalbuminuria, without long-term current use of insulin (HCC) E11.29, R80.9    Microalbuminuria R80.9    Nonrheumatic aortic valve stenosis I35.0    White coat syndrome with hypertension I10    Overweight (BMI 25.0-29. 9) [id number]    Cerebrovascular accident (CVA) due to thrombosis of right middle cerebral artery (HCC) I63.311    Hemispheric carotid artery syndrome G45.1     ROS  CV: no chest pain or palpitation  RESP: no cough or SOB    No flowsheet data found.      Du Herrera, who was evaluated through a patient-initiated, synchronous (real-time) audio only encounter, and/or her healthcare decision maker, is aware that it is a billable service, with coverage as determined by her insurance carrier. She provided verbal consent to proceed: Yes. She has not had a related appointment within my department in the past 7 days or scheduled within the next 24 hours.     Total Time: minutes: 21-30 minutes    Moira Pierce MD

## 2020-12-10 ENCOUNTER — TELEPHONE (OUTPATIENT)
Dept: FAMILY MEDICINE CLINIC | Age: 85
End: 2020-12-10

## 2020-12-10 DIAGNOSIS — R80.9 CONTROLLED TYPE 2 DIABETES MELLITUS WITH MICROALBUMINURIA, WITHOUT LONG-TERM CURRENT USE OF INSULIN (HCC): Primary | ICD-10-CM

## 2020-12-10 DIAGNOSIS — E11.29 CONTROLLED TYPE 2 DIABETES MELLITUS WITH MICROALBUMINURIA, WITHOUT LONG-TERM CURRENT USE OF INSULIN (HCC): Primary | ICD-10-CM

## 2020-12-10 NOTE — TELEPHONE ENCOUNTER
Patient is requesting a referral for a podiatrist for diabetic foot care. She also requested to see if she could come into the office sooner than Wednesday, January 20,2021 to have her toenails clipped, or possibly if another physician  could clip her toenails sooner.

## 2020-12-11 NOTE — TELEPHONE ENCOUNTER
Called pt and left message. Call back number left and I myself or one of the other nurses will attempt to contact again. The call was to inform pt about pod referral. .

## 2021-01-20 ENCOUNTER — HOSPITAL ENCOUNTER (OUTPATIENT)
Dept: LAB | Age: 86
Discharge: HOME | End: 2021-01-20
Payer: MEDICARE

## 2021-01-20 ENCOUNTER — OFFICE VISIT (OUTPATIENT)
Dept: FAMILY MEDICINE CLINIC | Age: 86
End: 2021-01-20
Payer: MEDICARE

## 2021-01-20 VITALS
SYSTOLIC BLOOD PRESSURE: 150 MMHG | TEMPERATURE: 98 F | HEIGHT: 59 IN | WEIGHT: 137.6 LBS | HEART RATE: 68 BPM | RESPIRATION RATE: 16 BRPM | DIASTOLIC BLOOD PRESSURE: 71 MMHG | OXYGEN SATURATION: 100 % | BODY MASS INDEX: 27.74 KG/M2

## 2021-01-20 DIAGNOSIS — E11.29 CONTROLLED TYPE 2 DIABETES MELLITUS WITH MICROALBUMINURIA, WITHOUT LONG-TERM CURRENT USE OF INSULIN (HCC): ICD-10-CM

## 2021-01-20 DIAGNOSIS — E78.00 PURE HYPERCHOLESTEROLEMIA: ICD-10-CM

## 2021-01-20 DIAGNOSIS — Z13.31 SCREENING FOR DEPRESSION: ICD-10-CM

## 2021-01-20 DIAGNOSIS — Z71.89 ADVANCED CARE PLANNING/COUNSELING DISCUSSION: ICD-10-CM

## 2021-01-20 DIAGNOSIS — E55.9 VITAMIN D DEFICIENCY: ICD-10-CM

## 2021-01-20 DIAGNOSIS — I10 ESSENTIAL HYPERTENSION: ICD-10-CM

## 2021-01-20 DIAGNOSIS — E66.3 OVERWEIGHT (BMI 25.0-29.9): ICD-10-CM

## 2021-01-20 DIAGNOSIS — Z00.00 MEDICARE ANNUAL WELLNESS VISIT, SUBSEQUENT: Primary | ICD-10-CM

## 2021-01-20 DIAGNOSIS — R80.9 CONTROLLED TYPE 2 DIABETES MELLITUS WITH MICROALBUMINURIA, WITHOUT LONG-TERM CURRENT USE OF INSULIN (HCC): ICD-10-CM

## 2021-01-20 LAB
25(OH)D3 SERPL-MCNC: 17.5 NG/ML (ref 30–100)
ALBUMIN SERPL-MCNC: 3.8 G/DL (ref 3.4–5)
ALBUMIN/GLOB SERPL: 1.1 {RATIO} (ref 0.8–1.7)
ALP SERPL-CCNC: 99 U/L (ref 45–117)
ALT SERPL-CCNC: 20 U/L (ref 13–56)
ANION GAP SERPL CALC-SCNC: 6 MMOL/L (ref 3–18)
AST SERPL-CCNC: 14 U/L (ref 10–38)
BASOPHILS # BLD: 0 K/UL (ref 0–0.1)
BASOPHILS NFR BLD: 0 % (ref 0–2)
BILIRUB SERPL-MCNC: 0.4 MG/DL (ref 0.2–1)
BUN SERPL-MCNC: 14 MG/DL (ref 7–18)
BUN/CREAT SERPL: 17 (ref 12–20)
CALCIUM SERPL-MCNC: 9.3 MG/DL (ref 8.5–10.1)
CHLORIDE SERPL-SCNC: 107 MMOL/L (ref 100–111)
CHOLEST SERPL-MCNC: 234 MG/DL
CO2 SERPL-SCNC: 30 MMOL/L (ref 21–32)
CREAT SERPL-MCNC: 0.82 MG/DL (ref 0.6–1.3)
DIFFERENTIAL METHOD BLD: ABNORMAL
EOSINOPHIL # BLD: 0.1 K/UL (ref 0–0.4)
EOSINOPHIL NFR BLD: 2 % (ref 0–5)
ERYTHROCYTE [DISTWIDTH] IN BLOOD BY AUTOMATED COUNT: 13.8 % (ref 11.6–14.5)
EST. AVERAGE GLUCOSE BLD GHB EST-MCNC: 154 MG/DL
GLOBULIN SER CALC-MCNC: 3.6 G/DL (ref 2–4)
GLUCOSE SERPL-MCNC: 111 MG/DL (ref 74–99)
HBA1C MFR BLD: 7 % (ref 4.2–5.6)
HCT VFR BLD AUTO: 39.5 % (ref 35–45)
HDLC SERPL-MCNC: 67 MG/DL (ref 40–60)
HDLC SERPL: 3.5 {RATIO} (ref 0–5)
HGB BLD-MCNC: 13.1 G/DL (ref 12–16)
LDLC SERPL CALC-MCNC: 150.4 MG/DL (ref 0–100)
LIPID PROFILE,FLP: ABNORMAL
LYMPHOCYTES # BLD: 2.6 K/UL (ref 0.9–3.6)
LYMPHOCYTES NFR BLD: 40 % (ref 21–52)
MCH RBC QN AUTO: 30.8 PG (ref 24–34)
MCHC RBC AUTO-ENTMCNC: 33.2 G/DL (ref 31–37)
MCV RBC AUTO: 92.9 FL (ref 74–97)
MONOCYTES # BLD: 0.5 K/UL (ref 0.05–1.2)
MONOCYTES NFR BLD: 8 % (ref 3–10)
NEUTS SEG # BLD: 3.3 K/UL (ref 1.8–8)
NEUTS SEG NFR BLD: 50 % (ref 40–73)
PLATELET # BLD AUTO: 274 K/UL (ref 135–420)
PMV BLD AUTO: 12.8 FL (ref 9.2–11.8)
POTASSIUM SERPL-SCNC: 4.2 MMOL/L (ref 3.5–5.5)
PROT SERPL-MCNC: 7.4 G/DL (ref 6.4–8.2)
RBC # BLD AUTO: 4.25 M/UL (ref 4.2–5.3)
SODIUM SERPL-SCNC: 143 MMOL/L (ref 136–145)
TRIGL SERPL-MCNC: 83 MG/DL (ref ?–150)
VLDLC SERPL CALC-MCNC: 16.6 MG/DL
WBC # BLD AUTO: 6.6 K/UL (ref 4.6–13.2)

## 2021-01-20 PROCEDURE — 1090F PRES/ABSN URINE INCON ASSESS: CPT | Performed by: INTERNAL MEDICINE

## 2021-01-20 PROCEDURE — G8419 CALC BMI OUT NRM PARAM NOF/U: HCPCS | Performed by: INTERNAL MEDICINE

## 2021-01-20 PROCEDURE — G8510 SCR DEP NEG, NO PLAN REQD: HCPCS | Performed by: INTERNAL MEDICINE

## 2021-01-20 PROCEDURE — 83036 HEMOGLOBIN GLYCOSYLATED A1C: CPT

## 2021-01-20 PROCEDURE — 82306 VITAMIN D 25 HYDROXY: CPT

## 2021-01-20 PROCEDURE — 85025 COMPLETE CBC W/AUTO DIFF WBC: CPT

## 2021-01-20 PROCEDURE — 99214 OFFICE O/P EST MOD 30 MIN: CPT | Performed by: INTERNAL MEDICINE

## 2021-01-20 PROCEDURE — 36415 COLL VENOUS BLD VENIPUNCTURE: CPT

## 2021-01-20 PROCEDURE — 1101F PT FALLS ASSESS-DOCD LE1/YR: CPT | Performed by: INTERNAL MEDICINE

## 2021-01-20 PROCEDURE — G8427 DOCREV CUR MEDS BY ELIG CLIN: HCPCS | Performed by: INTERNAL MEDICINE

## 2021-01-20 PROCEDURE — 80053 COMPREHEN METABOLIC PANEL: CPT

## 2021-01-20 PROCEDURE — 80061 LIPID PANEL: CPT

## 2021-01-20 PROCEDURE — G8536 NO DOC ELDER MAL SCRN: HCPCS | Performed by: INTERNAL MEDICINE

## 2021-01-20 PROCEDURE — G0439 PPPS, SUBSEQ VISIT: HCPCS | Performed by: INTERNAL MEDICINE

## 2021-01-20 PROCEDURE — 99497 ADVNCD CARE PLAN 30 MIN: CPT | Performed by: INTERNAL MEDICINE

## 2021-01-20 NOTE — PROGRESS NOTES
Internal Medicine Progress Note    Today's Date:  2017   Patient:  Abiola Augustine  Patient :  1932    Subjective:     Chief Complaint   Patient presents with    Diabetes    Annual Wellness Visit    Hypertension    Weight Management    Cholesterol Problem    Wheezing     Hypertension   This is a chronic problem. BP is at goal.  Pt is on losartan and amlodipine. Pt stopped labetalol.     Diabetes mellitus  This is a chronic problem. This is controlled. Pt takes metformin. Pt is on aspirin and statin.  +microalbuminuria on an ARB. Larwence Saunas is cost prohibitive. Overweight/Hyperlipidemia  This is a chronic problem. This is not at goal. Pt exercises regularly. Pt tries to eat a healthy diet. 3 most recent PHQ Screens 2021   PHQ Not Done -   Little interest or pleasure in doing things Not at all   Feeling down, depressed, irritable, or hopeless Not at all   Total Score PHQ 2 0     Past Medical History:   Diagnosis Date    Advance directive discussed with patient 10/12/2016    Cigarette nicotine dependence in remission 10/12/2016    Dizziness 3/16/2017    Essential hypertension 10/3/2016    Microalbuminuria 2017    Osteopenia 10/3/2016    Osteoporosis     Pure hypercholesterolemia 2017    Type II diabetes mellitus ([facility] [address] 10/3/2016    Vitamin D deficiency 2017     History reviewed. No pertinent surgical history. reports that she has never smoked. She has never used smokeless tobacco. She reports that she does not drink alcohol or use illicit drugs.     Family History   Problem Relation Age of Onset    Hypertension Mother     Cancer Mother      pancreas    Cancer Father      lung     Review of Systems   CV:      chest pain, palpitations  PULM:  SOB, wheezing, cough, sputum production    Current Outpatient Meds and Allergies     Current Outpatient Prescriptions on File Prior to Visit   Medication Sig Dispense Refill    valsartan-hydroCHLOROthiazide (DIOVAN-HCT) 320-25 mg per tablet Take 1 Tab by mouth daily.  pravastatin (PRAVACHOL) 40 mg tablet Take 40 mg by mouth nightly.  metoprolol succinate (TOPROL-XL) 25 mg XL tablet Take 1 Tab by mouth daily. 90 Tab 3    metFORMIN (GLUCOPHAGE) 500 mg tablet Take 1 Tab by mouth two (2) times daily (with meals). 180 Tab 3    glucose blood VI test strips (BLOOD GLUCOSE TEST) strip Check BS BID. Dx code: E11.65 200 Strip 3    calcium-cholecalciferol, D3, (CALCIUM 600 + D) tablet Take 1 Tab by mouth two (2) times a day. 180 Tab 3    aspirin delayed-release 81 mg tablet Take  by mouth daily. No current facility-administered medications on file prior to visit. No Known Allergies    Objective:       Visit Vitals  BP (!) 150/71   Pulse 68   Temp 98 °F (36.7 °C) (Temporal)   Resp 16   Ht 4' 11\" (1.499 m)   Wt 137 lb 9.6 oz (62.4 kg)   SpO2 100%   BMI 27.79 kg/m²     General:   Well-nourished, well-groomed, pleasant, alert, in no acute distress  Ears:  External ears WNL, TMs WNL  Eyes:  EOMI, PERRL  Nose:  External nares WNL  Psych:  No pressured speech, no abnormal thought content    Lab Results   Component Value Date/Time    GFR est AA >60 01/20/2021 11:45 AM    GFR est non-AA >60 01/20/2021 11:45 AM    Creatinine, POC 0.6 04/04/2017 08:29 AM    Creatinine 0.82 01/20/2021 11:45 AM    BUN 14 01/20/2021 11:45 AM    Sodium 143 01/20/2021 11:45 AM    Potassium 4.2 01/20/2021 11:45 AM    Chloride 107 01/20/2021 11:45 AM    CO2 30 01/20/2021 11:45 AM     Assessment/Plan & Orders:         ICD-10-CM ICD-9-CM    1. Medicare annual wellness visit, subsequent  Z00.00 V70.0    2. Essential hypertension  I10 401.9 CBC WITH AUTOMATED DIFF   3. Controlled type 2 diabetes mellitus with microalbuminuria, without long-term current use of insulin ([facility])  E11.29 250.40  DIABETES FOOT EXAM    R80.9 791.0 LIPID PANEL      METABOLIC PANEL, COMPREHENSIVE      HEMOGLOBIN A1C WITH EAG   4.  Pure hypercholesterolemia  E78.00 272.0    5. Overweight (BMI 25.0-29. 9)  E66.3 278.02    6. Vitamin D deficiency  E55.9 268.9 VITAMIN D, 25 HYDROXY   7. Screening for depression  Z13.31 V79.0 [place] DEPRESSION SCREEN ANNUAL   8. Advanced care planning/counseling discussion  Z71.89 V65.49 ADVANCE CARE PLANNING FIRST 30 MINS      Continue checking BP at home. Call us if not at goal  Follow up with neurology and podiatry    Follow-up and Dispositions    · Return in about 1 year (around 1/20/2022) for Medicare wellness, 30 minutes, Go over labs/imaging. Follow-up and Disposition History        *Patient verbalized understanding and agreement with the plan. Patient was given an after-visit summary. Keke Sky.  [address], MD - Internal Medicine  9/27/2017, 11:20 AM  [facility]  [address] [place], [address]  Phone [phone]  Fax [phone]

## 2021-01-20 NOTE — ACP (ADVANCE CARE PLANNING)
Pt would like to appoint her daughter, Angeli Dickinson, as her medical decision maker in the event that she can no longer do so. Phone number is [phone]. Her secondary person is her daughter, Vincent Rubi. Phone number is [phone]. If her death is imminent and medical treatment will not help her recover, pt does not want life prolonging measures. If her condition makes her unaware of herself or her surroundings and she cannot interact with others, pt does not want life prolonging measures. Advance directive scanned in the chart under media.

## 2021-01-20 NOTE — PROGRESS NOTES
Donnell Zambrano is a 80 y.o. female and presents for annual Medicare Wellness Visit. Problem List: Reviewed with patient and discussed risk factors. Patient Active Problem List   Diagnosis Code    Osteopenia M85.80    Essential hypertension I10    Controlled type 2 diabetes mellitus with microalbuminuria, without long-term current use of insulin (HCC) E11.29, R80.9    Microalbuminuria R80.9    Pure hypercholesterolemia E78.00    Nonrheumatic aortic valve stenosis I35.0    White coat syndrome with hypertension I10    Overweight (BMI 25.0-29. 9) E66.3    Cerebrovascular accident (CVA) due to thrombosis of right middle cerebral artery (HCC) I63.311    Hemispheric carotid artery syndrome G45.1     Current medical providers:  Patient Care Team:  Viviane Hamilton MD as PCP - General (Internal Medicine)  Boris Boykin OD (Ophthalmology)  Edmund Prieto OD (Ophthalmology)    PSH: Reviewed with patient  History reviewed. No pertinent surgical history. SH: Reviewed with patient  Social History     Tobacco Use    Smoking status: Never Smoker    Smokeless tobacco: Never Used   Substance Use Topics    Alcohol use: No    Drug use: No     FH: Reviewed with patient  Family History   Problem Relation Age of Onset    Hypertension Mother     Cancer Mother         pancreas    Cancer Father         lung     Medications/Allergies: Reviewed with patient  Current Outpatient Medications on File Prior to Visit   Medication Sig Dispense Refill    metFORMIN ER (GLUCOPHAGE XR) 750 mg tablet TAKE 1 TABLET EVERY DAY 90 Tab 3    losartan (COZAAR) 100 mg tablet TAKE 1 TABLET EVERY DAY 90 Tab 3    amLODIPine (NORVASC) 10 mg tablet Take 1 Tab by mouth daily.  90 Tab 3    atorvastatin (LIPITOR) 80 mg tablet TAKE 1 TABLET EVERY NIGHT 90 Tab 3    glucose blood VI test strips (ACCU-CHEK FAROOQ PLUS TEST STRP) strip Check BS daily prn 100 Strip 3    Blood-Glucose Meter (ACCU-CHEK FAROOQ PLUS METER) misc Check BS daily prn 1 Each 0    aspirin (ASPIRIN) 325 mg tablet TAKE 1 TABLET EVERY DAY  3    calcium-cholecalciferol, D3, (CALCIUM 600 + D) tablet Take 1 Tab by mouth two (2) times a day. 180 Tab 3    [DISCONTINUED] labetalol (NORMODYNE) 100 mg tablet TAKE 1 TABLET BY MOUTH EVERY 12 HOURS  3     No current facility-administered medications on file prior to visit. No Known Allergies    Objective:  Visit Vitals  BP (!) 150/71   Pulse 68   Temp 98 °F (36.7 °C) (Temporal)   Resp 16   Ht 4' 11\" (1.499 m)   Wt 137 lb 9.6 oz (62.4 kg)   SpO2 100%   BMI 27.79 kg/m²    Body mass index is 27.79 kg/m². Assessment of cognitive impairment: Alert and oriented x 3  Depression Screen:   3 most recent PHQ Screens 1/20/2021   PHQ Not Done -   Little interest or pleasure in doing things Not at all   Feeling down, depressed, irritable, or hopeless Not at all   Total Score PHQ 2 0     Fall Risk Assessment:    Fall Risk Assessment, last 12 mths 1/20/2021   Able to walk? Yes   Fall in past 12 months? 0   Do you feel unsteady? 0   Are you worried about falling 0   Number of falls in past 12 months -   Fall with injury? -     Functional Ability:   Does the patient exhibit a steady gait? yes   How long did it take the patient to get up and walk from a sitting position? 2 seconds   Is the patient self reliant?  (ie can do own laundry, meals, household chores)  yes   Does the patient handle his/her own medications? yes   Does the patient handle his/her own money? yes   Is the patients home safe (ie good lighting, handrails on stairs and bath, etc.)? yes   Did you notice or did patient express any hearing difficulties? no   Did you notice of did patient express any vision difficulties?   no   Were distance and reading eye charts used? yes     Advance Care Planning:   Patient was offered the opportunity to discuss advance care planning:  yes     Does patient have an Advance Directive: yes.  Pt would like to appoint her daughter, Tahir Angeles, as her medical decision maker in the event that she can no longer do so. Phone number is [phone]. Her secondary person is her daughter, Jeana Zuleta. Phone number is [phone]. If her death is imminent and medical treatment will not help her recover, pt does not want life prolonging measures. If her condition makes her unaware of herself or her surroundings and she cannot interact with others, pt does not want life prolonging measures. Advance directive scanned in the chart under media. If no, did you provide information on Caring Connections?  no     Plan:    Orders Placed This Encounter    ADVANCE CARE PLANNING FIRST 30 MINS    CBC WITH AUTOMATED DIFF    LIPID PANEL    METABOLIC PANEL, COMPREHENSIVE    HEMOGLOBIN A1C WITH EAG    VITAMIN D, 25 HYDROXY    HM DIABETES FOOT EXAM    [place] DEPRESSION [address] Maintenance   Topic Date Due    COVID-19 Vaccine (1 of 2) 1948    GLAUCOMA SCREENING Q2Y  2021    Eye Exam Retinal or Dilated  2021    Shingrix Vaccine Age 50> (1 of 2) 2021 (Originally 1982)    Lipid Screen  2021    Foot Exam Q1  2022    Medicare Yearly Exam  2022    DTaP/Tdap/Td series (3 - Td) 2028    Bone Densitometry (Dexa) Screening  Completed    Flu Vaccine  Completed    Pneumococcal 65+ years  Completed     Time spent counseling pt on advanced care plannin minutes    *Patient verbalized understanding and agreement with the plan. A copy of the After Visit Summary with personalized health plan was given to the patient today. Follow-up and Dispositions    · Return in about 1 year (around 2022) for Medicare wellness, 30 minutes, Go over labs/imaging. Ana Houser.  Rui F Street, MD - Internal Medicine  2021, 11:17 AM  Formerly [facility]  [address] [place], [address]  Phone [phone]  Fax [phone]

## 2021-01-20 NOTE — PROGRESS NOTES
Carlee Power is a 80 y.o.  female presents today for office visit for follow up. Pt would also like to discuss [address] Pt is not fasting. Pt is in Room # 2      1. Have you been to the ER, urgent care clinic since your last visit? Hospitalized since your last visit? No    2. Have you seen or consulted any other health care providers outside of the [address] [place] [address] since your last visit? Include any pap smears or colon screening. No    Upcoming Appts  none    Health Maintenance reviewed      VORB: No orders of the defined types were placed in this encounter.   Deisi Dale, Benigno Andrew LPN

## 2021-01-21 RX ORDER — ERGOCALCIFEROL 1.25 MG/1
50000 CAPSULE ORAL
Qty: 12 CAP | Refills: 3 | Status: SHIPPED | OUTPATIENT
Start: 2021-01-21

## 2021-03-01 DIAGNOSIS — E11.29 CONTROLLED TYPE 2 DIABETES MELLITUS WITH MICROALBUMINURIA, WITHOUT LONG-TERM CURRENT USE OF INSULIN (HCC): Primary | ICD-10-CM

## 2021-03-01 DIAGNOSIS — R80.9 CONTROLLED TYPE 2 DIABETES MELLITUS WITH MICROALBUMINURIA, WITHOUT LONG-TERM CURRENT USE OF INSULIN (HCC): Primary | ICD-10-CM

## 2021-03-03 RX ORDER — LANCETS
EACH MISCELLANEOUS
Qty: 100 EACH | Refills: 2 | Status: SHIPPED | OUTPATIENT
Start: 2021-03-03

## 2021-03-03 RX ORDER — BLOOD SUGAR DIAGNOSTIC
STRIP MISCELLANEOUS
Qty: 100 STRIP | Refills: 2 | Status: SHIPPED | OUTPATIENT
Start: 2021-03-03

## 2021-03-03 RX ORDER — BLOOD-GLUCOSE METER
EACH MISCELLANEOUS
Qty: 1 EACH | Refills: 0 | Status: SHIPPED | OUTPATIENT
Start: 2021-03-03

## 2021-03-04 RX ORDER — ISOPROPYL ALCOHOL 70 ML/100ML
SWAB TOPICAL
Qty: 100 PAD | Refills: 0 | Status: SHIPPED | OUTPATIENT
Start: 2021-03-04

## 2021-03-04 RX ORDER — BLOOD GLUCOSE CONTROL HIGH,LOW
EACH MISCELLANEOUS
Qty: 1 BOTTLE | Refills: 0 | Status: SHIPPED | OUTPATIENT
Start: 2021-03-04

## 2021-11-12 ENCOUNTER — OFFICE VISIT (OUTPATIENT)
Dept: FAMILY MEDICINE CLINIC | Age: 86
End: 2021-11-12
Payer: MEDICARE

## 2021-11-12 ENCOUNTER — HOSPITAL ENCOUNTER (OUTPATIENT)
Dept: LAB | Age: 86
Discharge: HOME | End: 2021-11-12
Payer: MEDICARE

## 2021-11-12 VITALS
BODY MASS INDEX: 26.81 KG/M2 | TEMPERATURE: 97.5 F | RESPIRATION RATE: 15 BRPM | HEART RATE: 63 BPM | OXYGEN SATURATION: 97 % | HEIGHT: 59 IN | DIASTOLIC BLOOD PRESSURE: 80 MMHG | WEIGHT: 133 LBS | SYSTOLIC BLOOD PRESSURE: 180 MMHG

## 2021-11-12 DIAGNOSIS — E78.00 PURE HYPERCHOLESTEROLEMIA: ICD-10-CM

## 2021-11-12 DIAGNOSIS — R73.03 PREDIABETES: ICD-10-CM

## 2021-11-12 DIAGNOSIS — E11.29 CONTROLLED TYPE 2 DIABETES MELLITUS WITH MICROALBUMINURIA, WITHOUT LONG-TERM CURRENT USE OF INSULIN (HCC): ICD-10-CM

## 2021-11-12 DIAGNOSIS — R79.89 ELEVATED TSH: ICD-10-CM

## 2021-11-12 DIAGNOSIS — I25.10 CORONARY ARTERY DISEASE INVOLVING NATIVE HEART WITHOUT ANGINA PECTORIS, UNSPECIFIED VESSEL OR LESION TYPE: ICD-10-CM

## 2021-11-12 DIAGNOSIS — R80.9 CONTROLLED TYPE 2 DIABETES MELLITUS WITH MICROALBUMINURIA, WITHOUT LONG-TERM CURRENT USE OF INSULIN (HCC): ICD-10-CM

## 2021-11-12 DIAGNOSIS — R79.89 ELEVATED TSH: Primary | ICD-10-CM

## 2021-11-12 DIAGNOSIS — I10 UNCONTROLLED HYPERTENSION: ICD-10-CM

## 2021-11-12 LAB
ALBUMIN SERPL-MCNC: 3.7 G/DL (ref 3.4–5)
ALBUMIN/GLOB SERPL: 1 {RATIO} (ref 0.8–1.7)
ALP SERPL-CCNC: 91 U/L (ref 45–117)
ALT SERPL-CCNC: 26 U/L (ref 13–56)
ANION GAP SERPL CALC-SCNC: 10 MMOL/L (ref 3–18)
AST SERPL-CCNC: 19 U/L (ref 10–38)
BILIRUB SERPL-MCNC: 0.3 MG/DL (ref 0.2–1)
BUN SERPL-MCNC: 15 MG/DL (ref 7–18)
BUN/CREAT SERPL: 19 (ref 12–20)
CALCIUM SERPL-MCNC: 9.3 MG/DL (ref 8.5–10.1)
CHLORIDE SERPL-SCNC: 105 MMOL/L (ref 100–111)
CHOLEST SERPL-MCNC: 212 MG/DL
CO2 SERPL-SCNC: 24 MMOL/L (ref 21–32)
CREAT SERPL-MCNC: 0.8 MG/DL (ref 0.6–1.3)
EST. AVERAGE GLUCOSE BLD GHB EST-MCNC: 163 MG/DL
GLOBULIN SER CALC-MCNC: 3.7 G/DL (ref 2–4)
GLUCOSE SERPL-MCNC: 83 MG/DL (ref 74–99)
HBA1C MFR BLD: 7.3 % (ref 4.2–5.6)
HDLC SERPL-MCNC: 63 MG/DL (ref 40–60)
HDLC SERPL: 3.4 {RATIO} (ref 0–5)
LDLC SERPL CALC-MCNC: 122.2 MG/DL (ref 0–100)
LIPID PROFILE,FLP: ABNORMAL
POTASSIUM SERPL-SCNC: 3.8 MMOL/L (ref 3.5–5.5)
PROT SERPL-MCNC: 7.4 G/DL (ref 6.4–8.2)
SODIUM SERPL-SCNC: 139 MMOL/L (ref 136–145)
T4 FREE SERPL-MCNC: 0.9 NG/DL (ref 0.7–1.5)
TRIGL SERPL-MCNC: 134 MG/DL (ref ?–150)
TSH SERPL DL<=0.05 MIU/L-ACNC: 2.6 UIU/ML (ref 0.36–3.74)
VLDLC SERPL CALC-MCNC: 26.8 MG/DL

## 2021-11-12 PROCEDURE — 83036 HEMOGLOBIN GLYCOSYLATED A1C: CPT

## 2021-11-12 PROCEDURE — 80061 LIPID PANEL: CPT

## 2021-11-12 PROCEDURE — 99214 OFFICE O/P EST MOD 30 MIN: CPT | Performed by: PHYSICIAN ASSISTANT

## 2021-11-12 PROCEDURE — 1090F PRES/ABSN URINE INCON ASSESS: CPT | Performed by: PHYSICIAN ASSISTANT

## 2021-11-12 PROCEDURE — 84439 ASSAY OF FREE THYROXINE: CPT

## 2021-11-12 PROCEDURE — G8419 CALC BMI OUT NRM PARAM NOF/U: HCPCS | Performed by: PHYSICIAN ASSISTANT

## 2021-11-12 PROCEDURE — G8536 NO DOC ELDER MAL SCRN: HCPCS | Performed by: PHYSICIAN ASSISTANT

## 2021-11-12 PROCEDURE — G8432 DEP SCR NOT DOC, RNG: HCPCS | Performed by: PHYSICIAN ASSISTANT

## 2021-11-12 PROCEDURE — 3051F HG A1C>EQUAL 7.0%<8.0%: CPT | Performed by: PHYSICIAN ASSISTANT

## 2021-11-12 PROCEDURE — 80053 COMPREHEN METABOLIC PANEL: CPT

## 2021-11-12 PROCEDURE — 1101F PT FALLS ASSESS-DOCD LE1/YR: CPT | Performed by: PHYSICIAN ASSISTANT

## 2021-11-12 PROCEDURE — G8427 DOCREV CUR MEDS BY ELIG CLIN: HCPCS | Performed by: PHYSICIAN ASSISTANT

## 2021-11-12 NOTE — PROGRESS NOTES
HISTORY OF PRESENT ILLNESS  Mena Cadena is a 80 y.o. female. HPI  [facility] previous one is no longer here. She has had an elevated TSH in 2019 and has not been checked since so will recheck that today. Her BP is elevated despite being on mult meds now and in the past. She also has a hx of CAD and has never seen cardiology so will refer her there. She is also following up on her DM2 and cholesterol. She is fasting for labs. No Known Allergies    Current Outpatient Medications   Medication Sig    Blood Glucose Control High&Low (Accu-Chek Guide L1-L2 Ctrl Sol) soln Check BS daily prn    alcohol swabs (BD Single Use Swabs Regular) padm Check BS daily prn    Blood-Glucose Meter (Accu-Chek Ju Plus Meter) misc Check BS daily prn    glucose blood VI test strips (Accu-Chek Ju Plus test strp) strip Check BS daily prn    lancets (Accu-Chek Fastclix Lancet Drum) misc Check BS daily prn    ergocalciferol (ERGOCALCIFEROL) 1,250 mcg (50,000 unit) capsule Take 1 Cap by mouth every seven (7) days.  metFORMIN ER (GLUCOPHAGE XR) 750 mg tablet TAKE 1 TABLET EVERY DAY    losartan (COZAAR) 100 mg tablet TAKE 1 TABLET EVERY DAY    amLODIPine (NORVASC) 10 mg tablet Take 1 Tab by mouth daily.  atorvastatin (LIPITOR) 80 mg tablet TAKE 1 TABLET EVERY NIGHT    aspirin (ASPIRIN) 325 mg tablet TAKE 1 TABLET EVERY DAY    calcium-cholecalciferol, D3, (CALCIUM 600 + D) tablet Take 1 Tab by mouth two (2) times a day. No current facility-administered medications for this visit. Review of Systems   Constitutional: Negative. Negative for chills, fever and malaise/fatigue. HENT: Negative. Negative for congestion, ear pain, sore throat and tinnitus. Eyes: Negative. Negative for blurred vision, double vision and photophobia. Respiratory: Negative. Negative for cough, shortness of breath and wheezing. Cardiovascular: Negative.   Negative for chest pain, palpitations and leg swelling. Gastrointestinal: Negative. Negative for abdominal pain, heartburn, nausea and vomiting. Genitourinary: Negative. Negative for dysuria, frequency, hematuria and urgency. Musculoskeletal: Negative. Negative for back pain, joint pain, myalgias and neck pain. Skin: Negative. Negative for itching and rash. Neurological: Negative. Negative for dizziness, tingling, tremors and headaches. Psychiatric/Behavioral: Negative. Negative for depression and memory loss. The patient is not nervous/anxious and does not have insomnia. Visit Vitals  BP (!) 180/80   Pulse 63   Temp 97.5 °F (36.4 °C) (Temporal)   Resp 15   Ht 4' 11\" (1.499 m)   Wt 133 lb (60.3 kg)   SpO2 97%   BMI 26.86 kg/m²       Physical Exam  Constitutional:       Appearance: Normal appearance. HENT:      Head: Normocephalic and atraumatic. Cardiovascular:      Rate and Rhythm: Normal rate and regular rhythm. Pulses: Normal pulses. Heart sounds: Normal heart sounds. Pulmonary:      Effort: Pulmonary effort is normal.      Breath sounds: Normal breath sounds. Skin:     General: Skin is warm and dry. Neurological:      Mental Status: She is alert and oriented to person, place, and time. Psychiatric:         Mood and Affect: Mood normal.         Behavior: Behavior normal.         Thought Content: Thought content normal.         Judgment: Judgment normal.         ASSESSMENT and PLAN    ICD-10-CM ICD-9-CM    1. Elevated TSH  R79.89 794.5 TSH AND FREE T4   2. Controlled type 2 diabetes mellitus with microalbuminuria, without long-term current use of insulin ([facility])  E11.29 250.40 HEMOGLOBIN A1C WITH EAG    Z01.8 815.5 METABOLIC PANEL, COMPREHENSIVE   3. Pure hypercholesterolemia  E78.00 272.0 LIPID PANEL      METABOLIC PANEL, COMPREHENSIVE   4. Uncontrolled hypertension  I10 401.9 REFERRAL TO CARDIOLOGY   5.  Coronary artery disease involving native heart without angina pectoris, unspecified vessel or lesion type  I25.10 414.01 REFERRAL TO CARDIOLOGY   6. Prediabetes   R73.03 790.29 TSH AND FREE T4     Follow-up and Dispositions    · Return in about 3 months (around 2/12/2022) for follow up, diabetes, blood pressure. Pt expressed understanding of visit summary and plans for any follow ups or referrals as well as any medications prescribed.

## 2021-11-12 NOTE — PROGRESS NOTES
Cholo Flores is a 80 y.o.  female presents today for office visit for follow up. Pt is nnot fasting. Pt is in Room # 6      1. Have you been to the ER, urgent care clinic since your last visit? Hospitalized since your last visit? No    2. Have you seen or consulted any other health care providers outside of the [address] [place] [address] since your last visit? Include any pap smears or colon screening. No    Upcoming Appts  none    Health Maintenance reviewed       VORB: No orders of the defined types were placed in this encounter.   ANNA Melendez-Doreen Lopez, LPN

## 2021-11-24 NOTE — PATIENT INSTRUCTIONS
Learning About Carbohydrate (Carb) Counting and Eating Out When You Have Diabetes  Why plan your meals? Meal planning can be a key part of managing diabetes. Planning meals and snacks with the right balance of carbohydrate, protein, and fat can help you keep your blood sugar at the target level you set with your doctor. You don't have to eat special foods. You can eat what your family eats, including sweets once in a while. But you do have to pay attention to how often you eat and how much you eat of certain foods. You may want to work with a dietitian or a certified diabetes educator. He or she can give you tips and meal ideas and can answer your questions about meal planning. This health professional can also help you reach a healthy weight if that is one of your goals. What should you know about eating carbs? Managing the amount of carbohydrate (carbs) you eat is an important part of healthy meals when you have diabetes. Carbohydrate is found in many foods. · Learn which foods have carbs. And learn the amounts of carbs in different foods. ? Bread, cereal, pasta, and rice have about 15 grams of carbs in a serving. A serving is 1 slice of bread (1 ounce), ½ cup of cooked cereal, or 1/3 cup of cooked pasta or rice. ? Fruits have 15 grams of carbs in a serving. A serving is 1 small fresh fruit, such as an apple or orange; ½ of a banana; ½ cup of cooked or canned fruit; ½ cup of fruit juice; 1 cup of melon or raspberries; or 2 tablespoons of dried fruit. ? Milk and no-sugar-added yogurt have 15 grams of carbs in a serving. A serving is 1 cup of milk or 2/3 cup of no-sugar-added yogurt. ? Starchy vegetables have 15 grams of carbs in a serving. A serving is ½ cup of mashed potatoes or sweet potato; 1 cup winter squash; ½ of a small baked potato; ½ cup of cooked beans; or ½ cup cooked corn or green peas.   · Learn how much carbs to eat each day and at each meal. A dietitian or CDE can teach you how to keep track of the amount of carbs you eat. This is called carbohydrate counting. · If you are not sure how to count carbohydrate grams, use the Plate Method to plan meals. It is a good, quick way to make sure that you have a balanced meal. It also helps you spread carbs throughout the day. ? Divide your plate by types of foods. Put non-starchy vegetables on half the plate, meat or other protein food on one-quarter of the plate, and a grain or starchy vegetable in the final quarter of the plate. To this you can add a small piece of fruit and 1 cup of milk or yogurt, depending on how many carbs you are supposed to eat at a meal.  · Try to eat about the same amount of carbs at each meal. Do not \"save up\" your daily allowance of carbs to eat at one meal.  · Proteins have very little or no carbs per serving. Examples of proteins are beef, chicken, turkey, fish, eggs, tofu, cheese, cottage cheese, and peanut butter. A serving size of meat is 3 ounces, which is about the size of a deck of cards. Examples of meat substitute serving sizes (equal to 1 ounce of meat) are 1/4 cup of cottage cheese, 1 egg, 1 tablespoon of peanut butter, and ½ cup of tofu. How can you eat out and still eat healthy? · Learn to estimate the serving sizes of foods that have carbohydrate. If you measure food at home, it will be easier to estimate the amount in a serving of restaurant food. · If the meal you order has too much carbohydrate (such as potatoes, corn, or baked beans), ask to have a low-carbohydrate food instead. Ask for a salad or green vegetables. · If you use insulin, check your blood sugar before and after eating out to help you plan how much to eat in the future. · If you eat more carbohydrate at a meal than you had planned, take a walk or do other exercise. This will help lower your blood sugar. What are some tips for eating healthy? · Limit saturated fat, such as the fat from meat and dairy products.  This is a healthy choice because people who have diabetes are at higher risk of heart disease. So choose lean cuts of meat and nonfat or low-fat dairy products. Use olive or canola oil instead of butter or shortening when cooking. · Don't skip meals. Your blood sugar may drop too low if you skip meals and take insulin or certain medicines for diabetes. · Check with your doctor before you drink alcohol. Alcohol can cause your blood sugar to drop too low. Alcohol can also cause a bad reaction if you take certain diabetes medicines. Follow-up care is a key part of your treatment and safety. Be sure to make and go to all appointments, and call your doctor if you are having problems. It's also a good idea to know your test results and keep a list of the medicines you take. Where can you learn more? Go to http://www.almendarez.com/  Enter I147 in the search box to learn more about \"Learning About Carbohydrate (Carb) Counting and Eating Out When You Have Diabetes. \"  Current as of: December 17, 2020               Content Version: 13.0  © 2006-2021 [employer]. Care instructions adapted under license by [employer] (which disclaims liability or warranty for this information). If you have questions about a medical condition or this instruction, always ask your healthcare professional. [address] any warranty or liability for your use of this information.

## 2021-12-07 DIAGNOSIS — E11.29 CONTROLLED TYPE 2 DIABETES MELLITUS WITH MICROALBUMINURIA, WITHOUT LONG-TERM CURRENT USE OF INSULIN (HCC): ICD-10-CM

## 2021-12-07 DIAGNOSIS — E78.00 PURE HYPERCHOLESTEROLEMIA: ICD-10-CM

## 2021-12-07 DIAGNOSIS — R80.9 CONTROLLED TYPE 2 DIABETES MELLITUS WITH MICROALBUMINURIA, WITHOUT LONG-TERM CURRENT USE OF INSULIN (HCC): ICD-10-CM

## 2021-12-07 DIAGNOSIS — I10 ESSENTIAL HYPERTENSION, BENIGN: ICD-10-CM

## 2021-12-07 NOTE — TELEPHONE ENCOUNTER
Requested Prescriptions     Pending Prescriptions Disp Refills    losartan (COZAAR) 100 mg tablet 90 Tablet 3     Sig: TAKE 1 TABLET EVERY DAY    amLODIPine (NORVASC) 10 mg tablet 90 Tablet 3     Sig: Take 1 Tablet by mouth daily.     atorvastatin (LIPITOR) 80 mg tablet 90 Tablet 3     Sig: TAKE 1 TABLET EVERY NIGHT    metFORMIN ER (GLUCOPHAGE XR) 750 mg tablet 90 Tablet 3     Sig: TAKE 1 TABLET EVERY DAY

## 2021-12-08 RX ORDER — BLOOD-GLUCOSE METER
EACH MISCELLANEOUS
Qty: 1 EACH | Refills: 1 | Status: SHIPPED | OUTPATIENT
Start: 2021-12-08

## 2021-12-08 RX ORDER — AMLODIPINE BESYLATE 10 MG/1
10 TABLET ORAL DAILY
Qty: 90 TABLET | Refills: 3 | Status: SHIPPED | OUTPATIENT
Start: 2021-12-08

## 2021-12-08 RX ORDER — METFORMIN HYDROCHLORIDE 750 MG/1
TABLET, EXTENDED RELEASE ORAL
Qty: 90 TABLET | Refills: 3 | Status: SHIPPED | OUTPATIENT
Start: 2021-12-08

## 2021-12-08 RX ORDER — CALCIUM CITRATE/VITAMIN D3 200MG-6.25
TABLET ORAL
Qty: 300 STRIP | Refills: 3 | Status: SHIPPED | OUTPATIENT
Start: 2021-12-08 | End: 2022-07-19

## 2021-12-08 RX ORDER — ATORVASTATIN CALCIUM 80 MG/1
TABLET, FILM COATED ORAL
Qty: 90 TABLET | Refills: 3 | Status: SHIPPED | OUTPATIENT
Start: 2021-12-08

## 2021-12-08 RX ORDER — LOSARTAN POTASSIUM 100 MG/1
TABLET ORAL
Qty: 90 TABLET | Refills: 3 | Status: SHIPPED | OUTPATIENT
Start: 2021-12-08

## 2021-12-08 RX ORDER — INSULIN PUMP SYRINGE, 3 ML
EACH MISCELLANEOUS
Qty: 1 KIT | Refills: 0 | Status: SHIPPED | OUTPATIENT
Start: 2021-12-08

## 2022-06-17 ENCOUNTER — PATIENT OUTREACH (OUTPATIENT)
Dept: CASE MANAGEMENT | Age: 87
End: 2022-06-17

## 2022-06-17 NOTE — PROGRESS NOTES
.Date/Time:  6/17/2022 4:02 PM    Method of communication with patient:phone    [facility] ( [facility]) made out reach to  patient by telephone to offer Complex Case Management  ( CCM). Provided introduction to self, and explanation of the Ambulatory Care . Contact at [phone] anytime Monday thru Friday 08:00-4:30.

## 2022-06-21 ENCOUNTER — PATIENT OUTREACH (OUTPATIENT)
Dept: CASE MANAGEMENT | Age: 87
End: 2022-06-21

## 2022-06-21 NOTE — PROGRESS NOTES
.Date/Time:  6/21/2022 3:55 PM    Method of communication with patient:phone    [address] ( [facility]) made out reach to  patient by telephone to offer Complex Case Management  ( CCM). Provided introduction to self, and explanation of the Ambulatory Care . Contact at [phone] anytime Monday thru Friday 08:00-4:30.

## 2022-06-29 ENCOUNTER — PATIENT OUTREACH (OUTPATIENT)
Dept: CASE MANAGEMENT | Age: 87
End: 2022-06-29

## 2022-06-29 NOTE — PROGRESS NOTES
.Date/Time:  6/29/2022 4:31 PM    Method of communication with patient:phone    [facility] ( [facility]) made third out reach to  patient by telephone to offer Complex Case Management  ( CCM). Provided introduction to self, and explanation of the Ambulatory Care . Contact at [phone] anytime Monday thru Friday 08:00-4:30. Patient will not receive any further CCM calls for 90 days due to not being able to make contact.

## 2022-07-19 RX ORDER — CALCIUM CITRATE/VITAMIN D3 200MG-6.25
TABLET ORAL
Qty: 400 STRIP | Refills: 0 | Status: SHIPPED | OUTPATIENT
Start: 2022-07-19

## 2022-09-14 DIAGNOSIS — E11.29 CONTROLLED TYPE 2 DIABETES MELLITUS WITH MICROALBUMINURIA, WITHOUT LONG-TERM CURRENT USE OF INSULIN (HCC): ICD-10-CM

## 2022-09-14 DIAGNOSIS — R80.9 CONTROLLED TYPE 2 DIABETES MELLITUS WITH MICROALBUMINURIA, WITHOUT LONG-TERM CURRENT USE OF INSULIN (HCC): ICD-10-CM

## 2022-09-19 RX ORDER — METFORMIN HYDROCHLORIDE 750 MG/1
TABLET, EXTENDED RELEASE ORAL
Qty: 90 TABLET | Refills: 3 | OUTPATIENT
Start: 2022-09-19

## 2024-11-23 NOTE — PROGRESS NOTES
Encounter Date: 2024       History     Chief Complaint   Patient presents with    Emesis     Hx of colon cancer on chemo     67-year-old female with a past medical history diabetes, hypertension, colon cancer with metastases to her liver on active chemotherapy last session 2024 presents for evaluation of nausea and vomiting.  Patient reports that she has been unable to tolerate anything by mouth since discharge from the hospital on 1924.  Patient also reports intermittent right upper quadrant pain.  Patient states she was given Zofran for her nausea, but she vomits it up and is unable to keep anything down.  Patient continues to pass gas and have bowel movements.  She denies chest pain, fevers/chills, leg swelling, dysuria, diarrhea, any other symptoms.    The history is provided by the patient, the spouse and medical records. No  was used.     Review of patient's allergies indicates:  No Known Allergies  Past Medical History:   Diagnosis Date    Breast cancer     Right    Diabetes mellitus     Hypertension     Liver lesion 2024     Past Surgical History:   Procedure Laterality Date    BIOPSY OF LIVER WITH ULTRASOUND GUIDANCE N/A 2024    Procedure: BIOPSY, LIVER, WITH US GUIDANCE;  Surgeon: Gisell Spence MD;  Location: [facility] CATH LAB;  Service: Interventional Radiology;  Laterality: N/A;    BREAST BIOPSY Left 10/06/2017    BREAST BIOPSY Right     BREAST LUMPECTOMY Right     + CA     SECTION      , ,     COLONOSCOPY N/A 2019    Procedure: COLONOSCOPY;  Surgeon: Fredrick Bailey MD;  Location: [address]);  Service: Endoscopy;  Laterality: N/A;    HYSTERECTOMY       Family History   Problem Relation Name Age of Onset    Breast cancer Sister  48    Breast cancer Cousin mat         30's    Breast cancer Cousin pat 48    Pancreatic cancer Mother      Breast cancer Daughter      Ovarian cancer Neg Hx       Social History  Mervin Bansal is a 80 y.o. female and presents for annual Medicare Wellness Visit. Problem List: Reviewed with patient and discussed risk factors. Patient Active Problem List  
Diagnosis Code  Osteopenia M85.80  Essential hypertension I10  
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  
 Microalbuminuria R80.9  Nonrheumatic aortic valve stenosis I35.0  Vitamin D deficiency E55.9  White coat syndrome with hypertension I10  
 Overweight (BMI 25.0-29. 9) E66.3  Cerebrovascular accident (CVA) due to thrombosis of right middle cerebral artery (HCC) I63.311  
 Hemispheric carotid artery syndrome G45.1 Current medical providers:  Patient Care Team: 
Lora Aguero MD as PCP - General (Internal Medicine) Hattie Saini (Ophthalmology) Lamar Gomes RN as Ambulatory Care Navigator Codi Lugo OD (Ophthalmology) PSH: Reviewed with patient History reviewed. No pertinent surgical history. SH: Reviewed with patient Social History Tobacco Use  Smoking status: Never Smoker  Smokeless tobacco: Never Used Substance Use Topics  Alcohol use: No  
 Drug use: No  
 
FH: Reviewed with patient Family History Problem Relation Age of Onset  Hypertension Mother  Cancer Mother   
     pancreas  Cancer Father   
     lung Medications/Allergies: Reviewed with patient Current Outpatient Medications on File Prior to Visit Medication Sig Dispense Refill  metoprolol succinate (TOPROL-XL) 25 mg XL tablet TAKE 1 TABLET BY MOUTH DAILY. 90 Tab 3  
 amLODIPine (NORVASC) 10 mg tablet TAKE 1 TABLET BY MOUTH ONCE A DAY 90 Tab 3  chlorthalidone (HYGROTEN) 25 mg tablet Take  by mouth daily.  losartan (COZAAR) 50 mg tablet Take 1 Tab by mouth daily.  90 Tab 3  
 
     Tobacco Use    Smoking status: Never    Smokeless tobacco: Never   Substance Use Topics    Alcohol use: No    Drug use: No         Physical Exam     Initial Vitals [11/23/24 1010]   BP Pulse Resp Temp SpO2   125/60 100 20 97.9 °F (36.6 °C) 99 %      MAP       --         Physical Exam    Nursing note and vitals reviewed.  Constitutional: She appears ill.   Eyes: EOM are normal. Pupils are equal, round, and reactive to light. Scleral icterus is present.   Cardiovascular:  Normal rate and regular rhythm.           Pulmonary/Chest: Breath sounds normal. No respiratory distress.   Abdominal: Abdomen is soft. There is abdominal tenderness in the right upper quadrant. There is no rebound, no guarding, no tenderness at McBurney's point and negative Evans's sign. negative Rovsing's sign    Neurological: She is alert and oriented to person, place, and time.   Skin:   Jaundiced         ED Course   Procedures  Labs Reviewed   CBC W/ AUTO DIFFERENTIAL - Abnormal       Result Value    WBC 11.35      RBC 3.05 (*)     Hemoglobin 8.7 (*)     Hematocrit 28.5 (*)     MCV 93      MCH 28.5      MCHC 30.5 (*)     RDW 18.6 (*)     Platelets 614 (*)     MPV 9.3      Immature Granulocytes 2.3 (*)     Gran # (ANC) 9.5 (*)     Immature Grans (Abs) 0.26 (*)     Lymph # 0.9 (*)     Mono # 0.7      Eos # 0.0      Baso # 0.02      nRBC 2 (*)     Gran % 83.8 (*)     Lymph % 7.7 (*)     Mono % 6.0      Eosinophil % 0.0      Basophil % 0.2      Differential Method Automated     COMPREHENSIVE METABOLIC PANEL - Abnormal    Sodium 137      Potassium 4.3      Chloride 95      CO2 9 (*)     Glucose 65 (*)     BUN 57 (*)     Creatinine 1.3      Calcium 9.6      Total Protein 6.5      Albumin 1.8 (*)     Total Bilirubin 17.1 (*)     Alkaline Phosphatase 1,663 (*)      (*)      (*)     eGFR 45.1 (*)     Anion Gap 33 (*)    BETA - HYDROXYBUTYRATE, SERUM - Abnormal    Beta-Hydroxybutyrate 3.7 (*)    ISTAT PROCEDURE - Abnormal    POC PH  No current facility-administered medications on file prior to visit. No Known Allergies Objective: 
Visit Vitals /84 (BP 1 Location: Left arm, BP Patient Position: Sitting) Comment: manual  
Pulse (!) 54 Temp 97.4 °F (36.3 °C) (Oral) Resp 14 Ht 4' 9.75\" (1.467 m) Wt 135 lb 12.8 oz (61.6 kg) SpO2 100% BMI 28.63 kg/m² Body mass index is 28.63 kg/m². Assessment of cognitive impairment: Alert and oriented x 3 Depression Screen: PHQ over the last two weeks 11/20/2018 PHQ Not Done - Little interest or pleasure in doing things Not at all Feeling down, depressed, irritable, or hopeless Not at all Total Score PHQ 2 0 Fall Risk Assessment:   
Fall Risk Assessment, last 12 mths 11/20/2018 Able to walk? Yes Fall in past 12 months? Yes Fall with injury? Yes  
Number of falls in past 12 months 1 Fall Risk Score 2 Functional Ability:  
Does the patient exhibit a steady gait? yes How long did it take the patient to get up and walk from a sitting position? 2 seconds Is the patient self reliant?  (ie can do own laundry, meals, household chores)  yes Does the patient handle his/her own medications? yes Does the patient handle his/her own money? yes Is the patients home safe (ie good lighting, handrails on stairs and bath, etc.)? yes Did you notice or did patient express any hearing difficulties? no  
Did you notice of did patient express any vision difficulties?   no  
Were distance and reading eye charts used? yes Advance Care Planning:  
Patient was offered the opportunity to discuss advance care planning:  yes Does patient have an Advance Directive: yes If no, did you provide information on Caring Connections?  no  
 
Plan:   
Orders Placed This Encounter  ADVANCE CARE PLANNING FIRST 30 MINS  calcium-cholecalciferol, D3, (CALCIUM 600 + D) tablet  potassium chloride (KLOR-CON) 20 mEq packet  aspirin 81 mg chewable tablet 7.191 (*)     POC PCO2 32.7 (*)     POC PO2 36 (*)     POC HCO3 12.5 (*)     POC BE -16 (*)     POC SATURATED O2 56      POC TCO2 13 (*)     Sample VENOUS      Site Other      Allens Test N/A      DelSys Room Air      Mode SPONT     POCT GLUCOSE - Abnormal    POCT Glucose 168 (*)    ISTAT LACTATE - Abnormal    POC Lactate 13.75 (*)     Sample VENOUS      Site Other      Allens Test N/A     LIPASE    Lipase 23     URINALYSIS, REFLEX TO URINE CULTURE          Imaging Results              CT Abdomen Pelvis With IV Contrast NO Oral Contrast (Final result)  Result time 11/23/24 15:19:25      Final result by Caitlyn Oliveira MD (11/23/24 15:19:25)                   Impression:      1.  Markedly enlarged heterogeneous appearance of the liver consistent with biopsy-proven metastasis.  The heterogeneity appears more pronounced compared to prior exam and there is mass effect of the left hepatic lobe on the gastric fundus which appears more prominent compared to prior study.  There is also fluid in the distal esophagus suggesting either reflux or poor esophageal motility.    2.  Hyperdense solid-appearing mass involving the descending colon suspicious for primary colonic neoplasm, unchanged from prior study.    3.  Trace ascites, new from prior exam.    4.  Subcentimeter pulmonary nodules the right lower lobe appears similar to prior study and are suspicious for metastatic disease.    5.  Stable nonobstructing right renal calculi.      Electronically signed by: Caitlyn Oliveira MD  Date:    11/23/2024  Time:    15:19               Narrative:    EXAMINATION:  CT ABDOMEN PELVIS WITH IV CONTRAST    CLINICAL HISTORY:  Epigastric pain;    TECHNIQUE:  Low dose axial images, sagittal and coronal reformations were obtained from the lung bases to the pubic symphysis following the IV administration of 100 mL of Omnipaque 350 without oral contrast    COMPARISON:  Prior dated 11/04/2024.    FINDINGS:  Multiple subcentimeter   metFORMIN ER (GLUCOPHAGE XR) 750 mg tablet  atorvastatin (LIPITOR) 80 mg tablet Health Maintenance Topic Date Due  Shingrix Vaccine Age 50> (1 of 2) 1982  MEDICARE YEARLY EXAM  10/26/2018  Influenza Age 5 to Adult  2019 (Originally 2018)  HEMOGLOBIN A1C Q6M  2019  
 EYE EXAM RETINAL OR DILATED Q1  2019  
 FOOT EXAM Q1  10/03/2019  LIPID PANEL Q1  10/03/2019  GLAUCOMA SCREENING Q2Y  2020  
 DTaP/Tdap/Td series (3 - Td) 2028  Bone Densitometry (Dexa) Screening  Completed  Pneumococcal 65+ Low/Medium Risk  Addressed Time spent counseling pt on advanced care plannin minutes *Patient verbalized understanding and agreement with the plan. A copy of the After Visit Summary with personalized health plan was given to the patient today. Follow-up Disposition: 
Return in about 3 months (around 2019) for Diabetes mellitus, Hypertension, Weight management. Beny Phillips MD - Internal Medicine 2018, 11:17 AM 
[place] [id number] [facility], [address] Phone [phone] Fax [phone] pulmonary nodules at the right lung base appears similar to prior exam and are suspicious for metastatic disease.    The liver is markedly enlarged and heterogeneous.  The hepatic heterogeneity appears more pronounced when compared to prior exam, making it difficult to distinguish individual lesions.  Dominant lesion at the dome of the liver appears similar to prior exam measuring approximately 4.6 cm.  There is mass effect of the left hepatic lobe upon the gastric fundus which appears more pronounced compared to prior exam.  The distal esophagus is distended with fluid.    The gallbladder is not visualized.  The portal vein is patent.    The pancreas, spleen, and adrenal glands have a normal appearance.    The kidneys demonstrate normal cortical thickness.  There are nonobstructing right renal calculi.    Stomach and small bowel are nondilated.  The colon once again demonstrates an irregular mass in the descending colon measuring approximately 4.3 cm in greatest transaxial dimension, similar to prior exam.  With suspicious for primary colonic malignancy.  There is no evidence of obstruction at this site.    The bladder has a normal appearance.  There is trace free fluid in the pelvis.  The uterus is absent.    There is no free air is seen in the abdomen or pelvis.  No adenopathy is seen.    No osseous abnormalities identified.                                       Medications   cyproheptadine 4 mg tablet 4 mg (0 mg Oral Hold 11/23/24 1500)   dicyclomine capsule 10 mg (has no administration in time range)   hydroCHLOROthiazide tablet 25 mg (0 mg Oral Hold 11/23/24 1430)   NIFEdipine 24 hr tablet 60 mg (0 mg Oral Hold 11/23/24 1430)   OLANZapine tablet 5 mg (has no administration in time range)   oxyCODONE immediate release tablet 5 mg (has no administration in time range)   sodium chloride 0.9% flush 10 mL (has no administration in time range)   naloxone 0.4 mg/mL injection 0.02 mg (has no administration in time  range)   glucose chewable tablet 16 g (has no administration in time range)   glucose chewable tablet 24 g (has no administration in time range)   glucagon (human recombinant) injection 1 mg (has no administration in time range)   heparin (porcine) injection 5,000 Units (5,000 Units Subcutaneous Given 11/23/24 1430)   ondansetron injection 4 mg (has no administration in time range)   dextrose 10% bolus 125 mL 125 mL (has no administration in time range)   dextrose 10% bolus 250 mL 250 mL (has no administration in time range)   promethazine (PHENERGAN) 12.5 mg in 0.9% NaCl 50 mL IVPB (0 mg Intravenous Stopped 11/23/24 1346)   sodium chloride 0.9% bolus 1,000 mL 1,000 mL (0 mLs Intravenous Stopped 11/23/24 1305)   morphine injection 6 mg (6 mg Intravenous Given 11/23/24 1215)   dextrose 10% bolus 250 mL 250 mL (0 mLs Intravenous Stopped 11/23/24 1346)   sodium chloride 0.9% bolus 1,000 mL 1,000 mL (0 mLs Intravenous Stopped 11/23/24 1526)   iohexoL (OMNIPAQUE 350) injection 100 mL (100 mLs Intravenous Given 11/23/24 1437)     Medical Decision Making  67-year-old female with a past medical history diabetes, hypertension, colon cancer with metastases to her liver on active chemotherapy last session 11/18/2024 presents for evaluation of nausea and vomiting.    Initial diagnosis includes, but is not limited to, chemotherapy-induced nausea and vomiting, gastritis, gastroenteritis, progression of malignancy, cholecystitis, pancreatitis, SBO.    In emergency department, patient ill-appearing, but initial vitals within normal limits.  Pain treated with morphine and fluids given for dehydration.  Given that patient has not been able to tolerate Zofran at home due to vomiting, gave a trial of promethazine, as it is available in suppository in his medication the patient could take at home.  CMP notable for significantly diminished bicarb at 9 as well as severe elevations in bilirubin, LFTs, and anion gap.  Patient given  additional L of IV fluids and VBG, lactate, beta hydroxybutyrate added on to assess for possible starvation ketoacidosis.  CT abdomen and pelvis ordered given acute elevation in bilirubin.  Patient tolerating p.o. after promethazine, but labs with significant derangements concerning for starvation ketoacidosis.  Discussed patient with Hematology/Oncology, who agreed to admit patient to their service.    Critical Care  Date: 11/23/2024  Performed by: Jonny Lopez MD   Authorized by: Jonny Lopez MD    Total critical care time (exclusive of procedural time) : 35 minutes  Critical care was necessary to treat or prevent imminent or life-threatening deterioration of the following conditions:  anion gap metabolic acidosis, lactic acidosis      Amount and/or Complexity of Data Reviewed  Labs: ordered. Decision-making details documented in ED Course.  Radiology: ordered.    Risk  Prescription drug management.  Decision regarding hospitalization.  Risk Details: Patient received morphine, IV fluids, dextrose, promethazine while in the emergency department.  Admitted patient to Hematology Oncology for metabolic acidosis.               ED Course as of 11/23/24 1543   Sat Nov 23, 2024   1251 CBC auto differential(!)  Anemia, unchanged from previous  No leukocytosis  Thrombocytosis [BH]   1323 Comprehensive metabolic panel(!!)  Severely decreased bicarb  Hypoglycemia, will give D10  Elevated bilirubin and transaminases  Anion gap [BH]   1413 ISTAT PROCEDURE(!!)  Elevated, concerning for metabolic acidosis without appropriate respiratory compensation [BH]   1414 ISTAT Lactate(!!)  Elevated. Giving fluids [BH]      ED Course User Index  [BH] Jonny Lopez MD                             Clinical Impression:  Final diagnoses:  [C18.9] Colon cancer  [R11.2] Nausea and vomiting, unspecified vomiting type (Primary)  [E86.0] Dehydration  [E87.20] Metabolic acidosis  [E87.20] Lactic acidosis          ED Disposition Condition    Admit                  Jonny Lopez MD  Resident  11/23/24 [id number]